# Patient Record
Sex: FEMALE | Race: WHITE | NOT HISPANIC OR LATINO | Employment: FULL TIME | URBAN - METROPOLITAN AREA
[De-identification: names, ages, dates, MRNs, and addresses within clinical notes are randomized per-mention and may not be internally consistent; named-entity substitution may affect disease eponyms.]

---

## 2019-02-05 LAB — HBA1C MFR BLD HPLC: 4.8 %

## 2019-09-26 ENCOUNTER — CLINICAL SUPPORT (OUTPATIENT)
Dept: BARIATRICS | Facility: CLINIC | Age: 44
End: 2019-09-26

## 2019-09-26 VITALS
BODY MASS INDEX: 41.48 KG/M2 | WEIGHT: 225.4 LBS | HEART RATE: 88 BPM | SYSTOLIC BLOOD PRESSURE: 130 MMHG | TEMPERATURE: 98.7 F | RESPIRATION RATE: 14 BRPM | DIASTOLIC BLOOD PRESSURE: 90 MMHG | HEIGHT: 62 IN

## 2019-09-26 DIAGNOSIS — E66.01 MORBID OBESITY (HCC): Primary | ICD-10-CM

## 2019-09-26 DIAGNOSIS — E66.01 MORBID (SEVERE) OBESITY DUE TO EXCESS CALORIES (HCC): Primary | ICD-10-CM

## 2019-09-26 PROCEDURE — RECHECK

## 2019-09-26 NOTE — PROGRESS NOTES
Bariatric Nutrition Assessment Note    Type of surgery    Preop (No Weight checks)  Surgery Date: TBD  Surgeon: Dr Kellen Barry  41 y o   female     Wt with BMI of 25: 141 1lbs  Pre-Op Excess Wt: 84 3lbs  Wt: 225 4lbs  Ht:  62"  BMI:  41 2lbs  Weight History   Onset of Obesity: Adult, after high school    Pre-preganancy weight:  175lbs, then had 1st child (13yrs old now)  Family history of obesity: Yes, dad's side  Wt Loss Attempts: Commercial Programs ([employer], [employer], etc )  Counseling with  MD  Exercise  High Protein/Low CHO diets (Atkins, Union, etc )  OTC meds/supplements  Self Created Diets (Portion Control, Healthy Food Choices, etc )  Maximum Wt Lost: -20lbs ("diet doctor" with phentermine pills)    Review of History and Medications   Past Medical History:   Diagnosis Date    Acid reflux     Asthma     Constipation     Cough     Digestive disorder     Headache     Increased urinary frequency     Joint pain     Shortness of breath      Past Surgical History:   Procedure Laterality Date    CARPAL TUNNEL RELEASE      PARTIAL HYSTERECTOMY      TENDON REPAIR      TUBAL LIGATION       Social History     Socioeconomic History    Marital status: Single     Spouse name: None    Number of children: None    Years of education: None    Highest education level: None   Occupational History    None   Social Needs    Financial resource strain: None    Food insecurity:     Worry: None     Inability: None    Transportation needs:     Medical: None     Non-medical: None   Tobacco Use    Smoking status: Never Smoker    Smokeless tobacco: Never Used   Substance and Sexual Activity    Alcohol use: No    Drug use: No    Sexual activity: None   Lifestyle    Physical activity:     Days per week: None     Minutes per session: None    Stress: None   Relationships    Social connections:     Talks on phone: None     Gets together: None     Attends Congregational service: None     Active member of club or organization: None     Attends meetings of clubs or organizations: None     Relationship status: None    Intimate partner violence:     Fear of current or ex partner: None     Emotionally abused: None     Physically abused: None     Forced sexual activity: None   Other Topics Concern    None   Social History Narrative    None       Current Outpatient Medications:     acetaminophen-codeine (TYLENOL #3) 300-30 mg per tablet, Take 1 tablet by mouth every 4 (four) hours as needed for moderate pain, Disp: , Rfl:     clonazePAM (KlonoPIN) 0 5 mg tablet, Take 0 5 mg by mouth 2 (two) times a day as needed for seizures, Disp: , Rfl:     fluticasone (FLONASE) 50 mcg/act nasal spray, 1 spray into each nostril as needed , Disp: , Rfl:     Ibuprofen (ADVIL PO), Take by mouth Advil liquid gels take 600 mg daily  , Disp: , Rfl:     fluticasone-salmeterol (ADVAIR) 250-50 mcg/dose inhaler, Inhale 1 puff every 12 (twelve) hours, Disp: , Rfl:     sulfamethoxazole-trimethoprim (BACTRIM DS) 800-160 mg per tablet, Take 1 tablet by mouth 2 (two) times a day, Disp: , Rfl:   Food Intake and Lifestyle Assessment   Food Intake Assessment completed via food log brought by patient and usual work day: At work by 5:30am, up at 4:15am   Breakfast: 4:40am-oatmeal (2 packs) made with water and a sliced banana, cereal (raisin bran or rice chex) 2 cups with 1% milk or eggs (2-3) and toast (2 slices) with butter with oj or apple juice (8oz)  Day off:  Bagel and butter or 3 eggs and sausage (2 links) with toast or cereal  Snack: no snack on a work day - just [employer]: 11am on work day will buy a yogurt (non-greek) or granola bar ([place] crunchy bars) or a piece of fruit    Flako Brisk 16oz   Snack: no snack while at work  Leaves work between [employer], 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  Dinner: usually grabbing something on the way home-pizza (2) or Chinese (sweet and sour chicken and rice) or Tacos (from [address] [facility] rest)  On a day off:  Chicken, rice and broccoli or steak and green beans and baked potato, pork chop and corn and rice or pasta and meatballs  Snack: pretzels or fruit  Gets to be around 8:30pm on a work night  Beverage intake: water, sweetened beverages and juice  Protein supplement: dislikes  Estimated protein intake per day: 60gm  Estimated fluid intake per day: 48oz water, 16oz regular iced tea, 8oz juice  Meals eaten away from home: 4 times per week  Typical meal pattern: 2-3 meals per day and 1 snack per day in the evening  Eating Behaviors: Consumption of high calorie/ high fat foods, Consumption of high calorie beverages and Large portion sizes  Food allergies or intolerances: Allergies   Allergen Reactions    Seasonal Ic [Cholestatin] Cough     Cultural or Sabianist considerations: none    Physical Assessment  Physical Activity  Types of exercise: None  On her feet all day at work  Current physical limitations: work schedule, but no physical issues    Psychosocial Assessment   Support systems: spouse and children friend(s)  Socioeconomic factors: none    Nutrition Diagnosis  Diagnosis: Overweight / Obesity (NC-3 3)  Related to: Physical inactivity and Excessive energy intake  As Evidenced by: BMI >25     Nutrition Prescription: Recommend the following diet  Regular    Interventions and Teaching   Discussed pre-op and post-op nutrition guidelines  Patient educated and handouts provided    Surgical changes to stomach / GI  Capacity of post-surgery stomach  Diet progression  Adequate hydration  Sugar and fat restriction to decrease "dumping syndrome"  Fat restriction to decrease steatorrhea  Expected weight loss  Weight loss plateaus/ possibility of weight regain  Exercise  Suggestions for pre-op diet  Nutrition considerations after surgery  Protein supplements  Meal planning and preparation  Appropriate carbohydrate, protein, and fat intake, and food/fluid choices to maximize safe weight loss, nutrient intake, and tolerance   Dietary and lifestyle changes  Possible problems with poor eating habits  Intuitive eating  Techniques for self monitoring and keeping daily food journal  Potential for food intolerance after surgery, and ways to deal with them including: lactose intolerance, nausea, reflux, vomiting, diarrhea, food intolerance, appetite changes, gas  Vitamin / Mineral supplementation of Multivitamin with minerals and Vitamin D 2000IU pre-op    Education provided to: patient    Barriers to learning: No barriers identified  Readiness to change: preparation    Prior research on procedure: internet, discussed with provider and friends or family    Comprehension: verbalizes understanding     Expected Compliance: good  Recommendations  Pt is an appropriate candidate for surgery  Yes  Evaluation / Monitoring  Dietitian to Monitor: Eating pattern as discussed Tolerance of nutrition prescription Body weight Physical activity  BMI of 40 = 219lbs, pt currently at 225lbs (BMI 41 2) without comorbibities  Advised pt to make some diet changes, but do not go below 219lbs in order to remain qualified, but should not gain weight     Goals  Eliminate sugar sweetened beverages; use crystal light instead  Food journal via Chef saqib  Exercise 30 minutes 5 times per week  Complete lession plans 1-6  Eat 3 meals per day; provided with protein shake to try and possibly have as lunch with a piece of fruit  Eliminate mindless snacking  Have protein with each meal and snack  Decrease eating out to no more then 2x/week  Time Spent:   1 Hour
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
## 2019-10-23 ENCOUNTER — OFFICE VISIT (OUTPATIENT)
Dept: BARIATRICS | Facility: CLINIC | Age: 44
End: 2019-10-23
Payer: COMMERCIAL

## 2019-10-23 VITALS
RESPIRATION RATE: 16 BRPM | SYSTOLIC BLOOD PRESSURE: 132 MMHG | TEMPERATURE: 98.3 F | HEART RATE: 99 BPM | BODY MASS INDEX: 42.03 KG/M2 | WEIGHT: 228.4 LBS | DIASTOLIC BLOOD PRESSURE: 98 MMHG | HEIGHT: 62 IN

## 2019-10-23 DIAGNOSIS — E66.01 OBESITY, CLASS III, BMI 40-49.9 (MORBID OBESITY) (HCC): Primary | ICD-10-CM

## 2019-10-23 PROBLEM — E66.813 OBESITY, CLASS III, BMI 40-49.9 (MORBID OBESITY): Status: ACTIVE | Noted: 2019-10-23

## 2019-10-23 PROCEDURE — 99203 OFFICE O/P NEW LOW 30 MIN: CPT | Performed by: SURGERY

## 2019-10-23 NOTE — LETTER
October 23, 2019     Tita Oscar, [address] [address]    Patient: Christie Peters   YOB: 1975   Date of Visit: 10/23/2019       Dear Dr Caballero Neighbor:    Thank you for referring Christie Peters to me for evaluation for bariatric surgery  Below are my notes for this consultation  If you have questions, please do not hesitate to call me  I look forward to following your patient along with you  Sincerely,      Autry Gottron, M D   10/23/2019  2:27 PM          CC: No Recipients  Karen Partida MD  10/23/2019  2:26 PM  Sign at close encounter      [address] [facility] [address] 41 y o  female MRN: [id number]  Unit/Bed#:  Encounter: [id number]      HPI:  Christie Peters is a 41 y o  female who presents with a longstanding history of morbid obesity and inability to sustain a meaningful weight loss  Here today to discuss bariatric options  She is a employee at [employer]  Body mass index is 41 77 kg/m²  ++Suffers from anxiety, asthma  S/p CTR R, R foot sx, lap partial hysterectomy, GERD (diet)  JANETTE suspected (sleep study pending)    Visit type: initial visit    Symptoms: excess weight, weight increase and inability to loss weight    Associated Symptoms: anxiety    Associated Conditions: none  Disease Complications: none  Weight Loss Interest: high  Previous Diet Trials: low calorie     Exercise Frequency:infrequency  Types of Exercise: walking      Review of Systems   Constitutional: Negative  Respiratory: Negative  Cardiovascular: Negative  Gastrointestinal: Negative  Musculoskeletal: Negative  Neurological: Negative  All other systems reviewed and are negative        Historical Information   Past Medical History:   Diagnosis Date    Acid reflux     Asthma     Baker cyst, left     Constipation     Cough     Digestive disorder     Headache     Increased urinary frequency     Joint pain     Shortness of breath      Past Surgical History:   Procedure Laterality Date    CARPAL TUNNEL RELEASE      PARTIAL HYSTERECTOMY      TENDON REPAIR      TUBAL LIGATION       Social History   Social History     Substance and Sexual Activity   Alcohol Use No     Social History     Substance and Sexual Activity   Drug Use No     Social History     Tobacco Use   Smoking Status Never Smoker   Smokeless Tobacco Never Used     Family History:   Family History   Problem Relation Age of Onset    No Known Problems Mother     Canavan disease Father     Cancer Father        Meds/Allergies   all medications and allergies reviewed  Allergies   Allergen Reactions    Seasonal Ic [Cholestatin] Cough       Objective       Current Vitals:   /98 (BP Location: Left arm, Patient Position: Sitting, Cuff Size: Large)   Pulse 99   Temp 98 3 °F (36 8 °C) (Tympanic)   Resp 16   Ht 5' 2" (1 575 m)   Wt 104 kg (228 lb 6 4 oz)   BMI 41 77 kg/m²        Physical Exam   Constitutional: She is oriented to person, place, and time  She appears well-developed  HENT:   Head: Normocephalic  Eyes: EOM are normal    Neck: Normal range of motion  Cardiovascular: Normal rate  Pulmonary/Chest: Effort normal    Abdominal: She exhibits no distension  Musculoskeletal: Normal range of motion  Neurological: She is alert and oriented to person, place, and time  Skin: Skin is warm and dry  Psychiatric: She has a normal mood and affect  Her behavior is normal  Judgment and thought content normal        Lab Results: I have personally reviewed pertinent lab results  Imaging: I have personally reviewed pertinent reports  EKG, Pathology, and Other Studies: I have personally reviewed pertinent reports  Assessment/PLAN:    41 y o  yo female with a long standing h/o of obesity and inability to sustain any meaningful weight loss on her own despite several attempts  She is interested in the Laparoscopic Sleeve gastrectomy      ++Suffers from anxiety, asthma  S/p CTR R, R foot sx, lap partial hysterectomy, GERD (diet)  JANETTE suspected (sleep study pending)    I have explained our Enhanced Recovery After Bariatric Surgery (ERABS) protocol and benefits including preoperative, intraoperative and postoperative elements  As a part of her pre op evaluation, she will be referred to a cardiologist and for a sleep evaluation and consult  She needs an EGD to evaluate the anatomy of her GI tract prior to the operation  I have spent over 45 minutes with her face to face in the office today discussing her options and details of the surgery  We have seen an animation of the surgery on the computer that illustrates how the operation is done and how the anatomy will be altered with the procedure  Over 50% of this was coordinating care  She was given the opportunity to ask questions and I have answered all of them  I have discussed and educated the patient with regards to the components of our multidisciplinary program and the importance of compliance and follow up in the post operative period  The patient was also instructed with regards to the importance of behavior modification, nutritional counseling, support meeting attendance and lifestyle changes that are important to ensure success  Although there is a great statistical chance of improvement or even resolution of most of her associated comorbidities, the results vary from patient to patient and they largely depend on her commitment and compliance  She can't be below   219 lbs prior to the operation      Autry Gottron, M D   10/23/2019  2:17 PM

## 2019-10-23 NOTE — PROGRESS NOTES
BARIATRIC INITIAL CONSULT - BARIATRIC SURGERY    Kori Covington 41 y o  female MRN: [id number]  Unit/Bed#:  Encounter: [id number]      HPI:  Kori Covington is a 41 y o  female who presents with a longstanding history of morbid obesity and inability to sustain a meaningful weight loss  Here today to discuss bariatric options  She is a employee at [employer]  Body mass index is 41 77 kg/m²  ++Suffers from anxiety, asthma  S/p CTR R, R foot sx, lap partial hysterectomy, GERD (diet)  JANETTE suspected (sleep study pending)    Visit type: initial visit    Symptoms: excess weight, weight increase and inability to loss weight    Associated Symptoms: anxiety    Associated Conditions: none  Disease Complications: none  Weight Loss Interest: high  Previous Diet Trials: low calorie     Exercise Frequency:infrequency  Types of Exercise: walking      Review of Systems   Constitutional: Negative  Respiratory: Negative  Cardiovascular: Negative  Gastrointestinal: Negative  Musculoskeletal: Negative  Neurological: Negative  All other systems reviewed and are negative        Historical Information   Past Medical History:   Diagnosis Date    Acid reflux     Asthma     Baker cyst, left     Constipation     Cough     Digestive disorder     Headache     Increased urinary frequency     Joint pain     Shortness of breath      Past Surgical History:   Procedure Laterality Date    CARPAL TUNNEL RELEASE      PARTIAL HYSTERECTOMY      TENDON REPAIR      TUBAL LIGATION       Social History   Social History     Substance and Sexual Activity   Alcohol Use No     Social History     Substance and Sexual Activity   Drug Use No     Social History     Tobacco Use   Smoking Status Never Smoker   Smokeless Tobacco Never Used     Family History:   Family History   Problem Relation Age of Onset    No Known Problems Mother     Canavan disease Father     Cancer Father        Meds/Allergies   all medications and allergies reviewed  Allergies   Allergen Reactions    Seasonal Ic [Cholestatin] Cough       Objective       Current Vitals:   /98 (BP Location: Left arm, Patient Position: Sitting, Cuff Size: Large)   Pulse 99   Temp 98 3 °F (36 8 °C) (Tympanic)   Resp 16   Ht 5' 2" (1 575 m)   Wt 104 kg (228 lb 6 4 oz)   BMI 41 77 kg/m²       Physical Exam   Constitutional: She is oriented to person, place, and time  She appears well-developed  HENT:   Head: Normocephalic  Eyes: EOM are normal    Neck: Normal range of motion  Cardiovascular: Normal rate  Pulmonary/Chest: Effort normal    Abdominal: She exhibits no distension  Musculoskeletal: Normal range of motion  Neurological: She is alert and oriented to person, place, and time  Skin: Skin is warm and dry  Psychiatric: She has a normal mood and affect  Her behavior is normal  Judgment and thought content normal        Lab Results: I have personally reviewed pertinent lab results  Imaging: I have personally reviewed pertinent reports  EKG, Pathology, and Other Studies: I have personally reviewed pertinent reports  Assessment/PLAN:    41 y o  yo female with a long standing h/o of obesity and inability to sustain any meaningful weight loss on her own despite several attempts  She is interested in the Laparoscopic Sleeve gastrectomy  ++Suffers from anxiety, asthma  S/p CTR R, R foot sx, lap partial hysterectomy, GERD (diet)  JANETTE suspected (sleep study pending)    I have explained our Enhanced Recovery After Bariatric Surgery (ERABS) protocol and benefits including preoperative, intraoperative and postoperative elements  As a part of her pre op evaluation, she will be referred to a cardiologist and for a sleep evaluation and consult  She needs an EGD to evaluate the anatomy of her GI tract prior to the operation    I have spent over 45 minutes with her face to face in the office today discussing her options and details of the surgery  We have seen an animation of the surgery on the computer that illustrates how the operation is done and how the anatomy will be altered with the procedure  Over 50% of this was coordinating care  She was given the opportunity to ask questions and I have answered all of them  I have discussed and educated the patient with regards to the components of our multidisciplinary program and the importance of compliance and follow up in the post operative period  The patient was also instructed with regards to the importance of behavior modification, nutritional counseling, support meeting attendance and lifestyle changes that are important to ensure success  Although there is a great statistical chance of improvement or even resolution of most of her associated comorbidities, the results vary from patient to patient and they largely depend on her commitment and compliance  She can't be below   219 lbs prior to the operation      TOMASA Elizondo   10/23/2019  2:17 PM

## 2019-11-12 ENCOUNTER — TELEPHONE (OUTPATIENT)
Dept: PREADMISSION TESTING | Facility: HOSPITAL | Age: 44
End: 2019-11-12

## 2019-11-12 VITALS — BODY MASS INDEX: 42.14 KG/M2 | WEIGHT: 229 LBS | HEIGHT: 62 IN

## 2019-11-12 NOTE — PRE-PROCEDURE INSTRUCTIONS
Pre-Surgery Instructions:   Medication Instructions    clonazePAM (KlonoPIN) 0 5 mg tablet Instructed patient per Anesthesia Guidelines   fluticasone (FLONASE) 50 mcg/act nasal spray Instructed patient per Anesthesia Guidelines   fluticasone-salmeterol (ADVAIR) 250-50 mcg/dose inhaler Instructed patient per Anesthesia Guidelines   Ibuprofen (ADVIL PO) Instructed patient per Anesthesia Guidelines  To use inhaler a m   Of procedure

## 2019-11-13 ENCOUNTER — TELEPHONE (OUTPATIENT)
Dept: PREADMISSION TESTING | Facility: HOSPITAL | Age: 44
End: 2019-11-13

## 2019-11-14 ENCOUNTER — TELEPHONE (OUTPATIENT)
Dept: PREADMISSION TESTING | Facility: HOSPITAL | Age: 44
End: 2019-11-14

## 2019-11-15 ENCOUNTER — HOSPITAL ENCOUNTER (OUTPATIENT)
Dept: GASTROENTEROLOGY | Facility: AMBULARY SURGERY CENTER | Age: 44
Setting detail: OUTPATIENT SURGERY
Discharge: HOME | End: 2019-11-15
Attending: SURGERY
Payer: COMMERCIAL

## 2019-11-15 ENCOUNTER — ANESTHESIA EVENT (OUTPATIENT)
Dept: GASTROENTEROLOGY | Facility: AMBULARY SURGERY CENTER | Age: 44
End: 2019-11-15

## 2019-11-15 ENCOUNTER — ANESTHESIA (OUTPATIENT)
Dept: GASTROENTEROLOGY | Facility: AMBULARY SURGERY CENTER | Age: 44
End: 2019-11-15

## 2019-11-15 VITALS
HEIGHT: 62 IN | BODY MASS INDEX: 41.59 KG/M2 | SYSTOLIC BLOOD PRESSURE: 149 MMHG | HEART RATE: 82 BPM | OXYGEN SATURATION: 98 % | WEIGHT: 226 LBS | DIASTOLIC BLOOD PRESSURE: 96 MMHG | TEMPERATURE: 97.1 F | RESPIRATION RATE: 18 BRPM

## 2019-11-15 DIAGNOSIS — E66.01 MORBID OBESITY (HCC): ICD-10-CM

## 2019-11-15 PROCEDURE — 43239 EGD BIOPSY SINGLE/MULTIPLE: CPT | Performed by: SURGERY

## 2019-11-15 PROCEDURE — 88305 TISSUE EXAM BY PATHOLOGIST: CPT | Performed by: PATHOLOGY

## 2019-11-15 RX ORDER — ONDANSETRON 2 MG/ML
4 INJECTION INTRAMUSCULAR; INTRAVENOUS ONCE AS NEEDED
Status: DISCONTINUED | OUTPATIENT
Start: 2019-11-15 | End: 2019-11-19 | Stop reason: HOSPADM

## 2019-11-15 RX ORDER — SODIUM CHLORIDE 9 MG/ML
INJECTION, SOLUTION INTRAVENOUS CONTINUOUS PRN
Status: DISCONTINUED | OUTPATIENT
Start: 2019-11-15 | End: 2019-11-15 | Stop reason: SURG

## 2019-11-15 RX ORDER — SODIUM CHLORIDE, SODIUM LACTATE, POTASSIUM CHLORIDE, CALCIUM CHLORIDE 600; 310; 30; 20 MG/100ML; MG/100ML; MG/100ML; MG/100ML
125 INJECTION, SOLUTION INTRAVENOUS CONTINUOUS
Status: DISCONTINUED | OUTPATIENT
Start: 2019-11-15 | End: 2019-11-19 | Stop reason: HOSPADM

## 2019-11-15 RX ORDER — PROPOFOL 10 MG/ML
INJECTION, EMULSION INTRAVENOUS AS NEEDED
Status: DISCONTINUED | OUTPATIENT
Start: 2019-11-15 | End: 2019-11-15 | Stop reason: SURG

## 2019-11-15 RX ADMIN — PROPOFOL 50 MG: 10 INJECTION, EMULSION INTRAVENOUS at 08:35

## 2019-11-15 RX ADMIN — PROPOFOL 50 MG: 10 INJECTION, EMULSION INTRAVENOUS at 08:38

## 2019-11-15 RX ADMIN — SODIUM CHLORIDE: 0.9 INJECTION, SOLUTION INTRAVENOUS at 08:30

## 2019-11-15 RX ADMIN — PROPOFOL 100 MG: 10 INJECTION, EMULSION INTRAVENOUS at 08:34

## 2019-11-15 RX ADMIN — SODIUM CHLORIDE, SODIUM LACTATE, POTASSIUM CHLORIDE, AND CALCIUM CHLORIDE 125 ML/HR: .6; .31; .03; .02 INJECTION, SOLUTION INTRAVENOUS at 08:23

## 2019-11-15 NOTE — ANESTHESIA POSTPROCEDURE EVALUATION
Post-Op Assessment Note    CV Status:  Stable  Pain Score: 0    Pain management: adequate     Mental Status:  Alert and awake   Hydration Status:  Stable and euvolemic   PONV Controlled:  Controlled   Airway Patency:  Patent and adequate   Post Op Vitals Reviewed: Yes      Staff: CRNA           BP   140/86   Temp     Pulse  82   Resp   20   SpO2 95

## 2019-11-15 NOTE — H&P
This is a 45 y o  female with a history of morbid obesity and Body mass index is 41 34 kg/m²  Here for an EGD to evaluate the anatomy of the GI tract and to rule out the presence of H  pylori  Physical Exam    /95   Pulse 84   Temp (!) 97 1 °F (36 2 °C) (Tympanic)   Resp 18   Ht 5' 2" (1 575 m)   Wt 103 kg (226 lb)   SpO2 96%   BMI 41 34 kg/m²    AAOx3  RRR  CTA B  Abdomen obese  Benign  A/P:    This is a 45 y o  female with a history of morbid obesity and Body mass index is 41 34 kg/m²       Will proceed with the EGD and biopsies        Kev Gottlieb MD  11/15/2019  8:30 AM

## 2019-11-15 NOTE — ANESTHESIA PREPROCEDURE EVALUATION
Review of Systems/Medical History  Patient summary reviewed        Cardiovascular  Negative cardio ROS    Pulmonary  Asthma , well controlled/ stable ,        GI/Hepatic    GERD well controlled,        Negative  ROS        Endo/Other  Negative endo/other ROS      GYN  Negative gynecology ROS          Hematology  Negative hematology ROS      Musculoskeletal  Negative musculoskeletal ROS        Neurology    Headaches,    Psychology   Negative psychology ROS              Physical Exam    Airway    Mallampati score: II  TM Distance: >3 FB  Neck ROM: full     Dental   No notable dental hx     Cardiovascular  Comment: Negative ROS, Cardiovascular exam normal    Pulmonary  Pulmonary exam normal     Other Findings        Anesthesia Plan  ASA Score- 3     Anesthesia Type- IV sedation with anesthesia with ASA Monitors  Additional Monitors:   Airway Plan:         Plan Factors-    Induction-     Postoperative Plan-     Informed Consent- Anesthetic plan and risks discussed with patient  I personally reviewed this patient with the CRNA  Discussed and agreed on the Anesthesia Plan with the CRNA  Stefanie Albarado

## 2019-12-03 ENCOUNTER — CONSULT (OUTPATIENT)
Dept: CARDIOLOGY CLINIC | Facility: CLINIC | Age: 44
End: 2019-12-03
Payer: COMMERCIAL

## 2019-12-03 VITALS
BODY MASS INDEX: 41.96 KG/M2 | HEIGHT: 62 IN | OXYGEN SATURATION: 96 % | DIASTOLIC BLOOD PRESSURE: 88 MMHG | WEIGHT: 228 LBS | HEART RATE: 88 BPM | SYSTOLIC BLOOD PRESSURE: 140 MMHG

## 2019-12-03 DIAGNOSIS — E66.01 MORBID OBESITY (HCC): ICD-10-CM

## 2019-12-03 DIAGNOSIS — Z01.810 PREOP CARDIOVASCULAR EXAM: Primary | ICD-10-CM

## 2019-12-03 PROCEDURE — 99204 OFFICE O/P NEW MOD 45 MIN: CPT | Performed by: INTERNAL MEDICINE

## 2019-12-03 PROCEDURE — 93000 ELECTROCARDIOGRAM COMPLETE: CPT | Performed by: INTERNAL MEDICINE

## 2019-12-03 NOTE — PROGRESS NOTES
Subjective:     Tanya Bahena is a 45 y o  female  who presents to the office today for a preoperative consultation at the request of surgeon Dr Carlos Souza who plans on performing Gastric sleeve on February 3rd  Planned anesthesia is general  The patient has no known anesthesia issues  Most recent surgery was Ankle surgery 2 years ago without complications  she is able to ambulate 4 blocks on level ground or 2 flights of stairs without stopping   (>4 METs)  She denies any chest pain or shortness of breath with exertion  The following portions of the patient's history were reviewed and updated as appropriate:   She  has a past medical history of Acid reflux, Asthma, Baker cyst, left, Constipation, Cough, Digestive disorder, Headache, Increased urinary frequency, Joint pain, and Shortness of breath  She  has a past surgical history that includes Tendon repair; Carpal tunnel release; Partial hysterectomy; Tubal ligation; and Hysterectomy  Her family history includes Canavan disease in her father; Cancer in her father; No Known Problems in her mother  She  reports that she has never smoked  She has never used smokeless tobacco  She reports that she drinks alcohol  She reports that she does not use drugs  Current Outpatient Medications   Medication Sig Dispense Refill    clonazePAM (KlonoPIN) 0 5 mg tablet Take 0 5 mg by mouth 2 (two) times a day as needed for anxiety       fluticasone (FLONASE) 50 mcg/act nasal spray 1 spray into each nostril daily at bedtime       fluticasone-salmeterol (ADVAIR) 250-50 mcg/dose inhaler Inhale 1 puff every 12 (twelve) hours      Ibuprofen (ADVIL PO) Take by mouth Advil liquid gels take 600 mg daily        acetaminophen-codeine (TYLENOL #3) 300-30 mg per tablet Take 1 tablet by mouth every 4 (four) hours as needed for moderate pain      sulfamethoxazole-trimethoprim (BACTRIM DS) 800-160 mg per tablet Take 1 tablet by mouth 2 (two) times a day       No current facility-administered medications for this visit  She is allergic to seasonal ic [cholestatin]       Review of Systems  Review of Systems   Constitutional: Negative for chills, fatigue and fever  HENT: Negative for congestion, nosebleeds and postnasal drip  Respiratory: Negative for cough, chest tightness and shortness of breath  Cardiovascular: Negative for chest pain, palpitations and leg swelling  Gastrointestinal: Negative for abdominal distention, abdominal pain, diarrhea, nausea and vomiting  Endocrine: Negative for polydipsia, polyphagia and polyuria  Musculoskeletal: Negative for gait problem and myalgias  Skin: Negative for color change, pallor and rash  Allergic/Immunologic: Negative for environmental allergies, food allergies and immunocompromised state  Neurological: Negative for dizziness, seizures, syncope and light-headedness  Hematological: Negative for adenopathy  Does not bruise/bleed easily  Psychiatric/Behavioral: Negative for dysphoric mood  The patient is not nervous/anxious  Objective:      Physical Exam  /88 (BP Location: Right arm, Patient Position: Sitting, Cuff Size: Large)   Pulse 88   Ht 5' 2" (1 575 m)   Wt 103 kg (228 lb)   SpO2 96%   BMI 41 70 kg/m²    Physical Exam   Constitutional: She appears healthy  No distress  HENT:   Nose: Nose normal    Mouth/Throat: Dentition is normal  Oropharynx is clear  Eyes: Pupils are equal, round, and reactive to light  Conjunctivae are normal    Neck: Normal range of motion  Neck supple  No JVD present  Cardiovascular: Normal rate, regular rhythm and normal heart sounds  Exam reveals no gallop and no friction rub  No murmur heard  Pulmonary/Chest: Effort normal and breath sounds normal  She has no wheezes  She has no rales  Musculoskeletal: She exhibits no edema  Neurological: She is alert and oriented to person, place, and time  Skin: Skin is warm and dry          Cardiographics  ECG: normal sinus rhythm, no blocks or conduction defects, no ischemic changes      Lab Review   Hospital Outpatient Visit on 11/15/2019   Component Date Value    Case Report 11/15/2019                      Value:Surgical Pathology Report                         Case: [id number]                                   Authorizing Provider:  Sony Ramos MD        Collected:           11/15/2019 0836              Ordering Location:     [facility]   Received:            11/15/2019 [facility]                                                                       Pathologist:           Thu Batres MD                                                        Specimen:    Stomach, antrum R/O H-pylori                                                               Final Diagnosis 11/15/2019                      Value: This result contains rich text formatting which cannot be displayed here   Additional Information 11/15/2019                      Value: This result contains rich text formatting which cannot be displayed here  Deirdre Bhatti Gross Description 11/15/2019                      Value: This result contains rich text formatting which cannot be displayed here  Assessment:     1  Preop cardiovascular exam    2  Morbid obesity ([address] )           60 y o  female  with planned surgery as above  Known risk factors for perioperative complications: None        Cardiac Risk Estimation: per the Revised Cardiac Risk Index, the patient has a score of 0 placing her at low risk of major cardiac event (3 9%),  and may proceed to OR as planned  No further cardiac workup is indicated at this time  Plan:      1  Preoperative workup as follows none  2  Change in medication regimen before surgery: none, continue medication regimen including morning of surgery, with sip of water  3  Prophylaxis for cardiac events with perioperative beta-blockers: not indicated     4  Invasive hemodynamic monitoring perioperatively: at the discretion of anesthesiologist   5  Deep vein thrombosis prophylaxis postoperatively:regimen to be chosen by surgical team   6  Call if any change in status prior to surgical date  7  Review preoperative blood work and chest x-ray

## 2019-12-06 ENCOUNTER — HOSPITAL ENCOUNTER (OUTPATIENT)
Dept: GASTROENTEROLOGY | Facility: AMBULARY SURGERY CENTER | Age: 44
Discharge: HOME | End: 2019-12-06
Attending: SURGERY

## 2019-12-14 LAB
ALBUMIN SERPL-MCNC: 3.6 G/DL (ref 3.6–5.1)
ALBUMIN/GLOB SERPL: 1.4 (CALC) (ref 1–2.5)
ALP SERPL-CCNC: 64 U/L (ref 33–115)
ALT SERPL-CCNC: 20 U/L (ref 6–29)
AST SERPL-CCNC: 15 U/L (ref 10–30)
BASOPHILS # BLD AUTO: 68 CELLS/UL (ref 0–200)
BASOPHILS NFR BLD AUTO: 0.9 %
BILIRUB SERPL-MCNC: 0.5 MG/DL (ref 0.2–1.2)
BUN SERPL-MCNC: 11 MG/DL (ref 7–25)
BUN/CREAT SERPL: NORMAL (CALC) (ref 6–22)
CALCIUM SERPL-MCNC: 8.7 MG/DL (ref 8.6–10.2)
CHLORIDE SERPL-SCNC: 104 MMOL/L (ref 98–110)
CHOLEST SERPL-MCNC: 169 MG/DL
CHOLEST/HDLC SERPL: 3.5 (CALC)
CO2 SERPL-SCNC: 28 MMOL/L (ref 20–32)
CREAT SERPL-MCNC: 0.73 MG/DL (ref 0.5–1.1)
EOSINOPHIL # BLD AUTO: 60 CELLS/UL (ref 15–500)
EOSINOPHIL NFR BLD AUTO: 0.8 %
ERYTHROCYTE [DISTWIDTH] IN BLOOD BY AUTOMATED COUNT: 13.3 % (ref 11–15)
GLOBULIN SER CALC-MCNC: 2.6 G/DL (CALC) (ref 1.9–3.7)
GLUCOSE SERPL-MCNC: 83 MG/DL (ref 65–99)
HBA1C MFR BLD: 4.9 % OF TOTAL HGB
HCT VFR BLD AUTO: 39.3 % (ref 35–45)
HDLC SERPL-MCNC: 48 MG/DL
HGB BLD-MCNC: 13.2 G/DL (ref 11.7–15.5)
LDLC SERPL CALC-MCNC: 91 MG/DL (CALC)
LYMPHOCYTES # BLD AUTO: 2280 CELLS/UL (ref 850–3900)
LYMPHOCYTES NFR BLD AUTO: 30.4 %
MCH RBC QN AUTO: 31.4 PG (ref 27–33)
MCHC RBC AUTO-ENTMCNC: 33.6 G/DL (ref 32–36)
MCV RBC AUTO: 93.6 FL (ref 80–100)
MONOCYTES # BLD AUTO: 473 CELLS/UL (ref 200–950)
MONOCYTES NFR BLD AUTO: 6.3 %
NEUTROPHILS # BLD AUTO: 4620 CELLS/UL (ref 1500–7800)
NEUTROPHILS NFR BLD AUTO: 61.6 %
NONHDLC SERPL-MCNC: 121 MG/DL (CALC)
PLATELET # BLD AUTO: 227 THOUSAND/UL (ref 140–400)
PMV BLD REES-ECKER: 9.9 FL (ref 7.5–12.5)
POTASSIUM SERPL-SCNC: 3.9 MMOL/L (ref 3.5–5.3)
PROT SERPL-MCNC: 6.2 G/DL (ref 6.1–8.1)
RBC # BLD AUTO: 4.2 MILLION/UL (ref 3.8–5.1)
SL AMB EGFR AFRICAN AMERICAN: 118 ML/MIN/1.73M2
SL AMB EGFR NON AFRICAN AMERICAN: 102 ML/MIN/1.73M2
SODIUM SERPL-SCNC: 138 MMOL/L (ref 135–146)
TRIGL SERPL-MCNC: 210 MG/DL
TSH SERPL-ACNC: 1.41 MIU/L
WBC # BLD AUTO: 7.5 THOUSAND/UL (ref 3.8–10.8)

## 2020-01-14 ENCOUNTER — OFFICE VISIT (OUTPATIENT)
Dept: BARIATRICS | Facility: CLINIC | Age: 45
End: 2020-01-14

## 2020-01-14 VITALS — WEIGHT: 236 LBS | BODY MASS INDEX: 43.43 KG/M2 | HEIGHT: 62 IN

## 2020-01-14 DIAGNOSIS — R63.5 ABNORMAL WEIGHT GAIN: Primary | ICD-10-CM

## 2020-01-14 PROCEDURE — RECHECK

## 2020-01-14 NOTE — PROGRESS NOTES
Pt came in to check weight and go over workflow  Discussed needing results of sleep study and discuss submitting to insurance

## 2020-02-14 ENCOUNTER — TELEPHONE (OUTPATIENT)
Dept: BARIATRICS | Facility: CLINIC | Age: 45
End: 2020-02-14

## 2020-02-14 NOTE — TELEPHONE ENCOUNTER
Called pt to advise her that we did not receive her letter/referral from her PCP as of today  Pt states she will call her PCP office right now and have them send it over  Requested pt also schedule an appointment for a weight check since she hasn't been into the office for about a month  Pt states she will have to call back on Wednesday when she gets her work schedule

## 2020-03-17 ENCOUNTER — TELEPHONE (OUTPATIENT)
Dept: BARIATRICS | Facility: CLINIC | Age: 45
End: 2020-03-17

## 2020-03-17 NOTE — TELEPHONE ENCOUNTER
Spoke with patient regarding COVID screening  Patient denies any symptoms or recent travel at this time

## 2020-03-18 ENCOUNTER — OFFICE VISIT (OUTPATIENT)
Dept: BARIATRICS | Facility: CLINIC | Age: 45
End: 2020-03-18

## 2020-03-18 VITALS — WEIGHT: 232 LBS | HEIGHT: 62 IN | BODY MASS INDEX: 42.69 KG/M2

## 2020-03-18 DIAGNOSIS — Z98.84 BARIATRIC SURGERY STATUS: ICD-10-CM

## 2020-03-18 DIAGNOSIS — E66.01 MORBID (SEVERE) OBESITY DUE TO EXCESS CALORIES (HCC): Primary | ICD-10-CM

## 2020-03-18 PROCEDURE — RECHECK

## 2020-03-18 NOTE — PROGRESS NOTES
Bariatric Nutrition Assessment Note    Type of surgery    Preop (No Weight checks)  Surgery Date: TBD  Surgeon: Dr Homer Malhotra  45 y o   female     Wt with BMI of 25: 136#  Pre-Op Excess Wt: 96lbs  Wt: 232lbs  Ht:  62"  BMI:  42 4lbs  Review of History and Medications   Past Medical History:   Diagnosis Date    Acid reflux     Asthma     Baker cyst, left     Constipation     Cough     Digestive disorder     Headache     Increased urinary frequency     Joint pain     Shortness of breath      Past Surgical History:   Procedure Laterality Date    CARPAL TUNNEL RELEASE      HYSTERECTOMY      PARTIAL HYSTERECTOMY      TENDON REPAIR      TUBAL LIGATION       Social History     Socioeconomic History    Marital status: Single     Spouse name: Not on file    Number of children: Not on file    Years of education: Not on file    Highest education level: Not on file   Occupational History    Not on file   Social Needs    Financial resource strain: Not on file    Food insecurity:     Worry: Not on file     Inability: Not on file    Transportation needs:     Medical: Not on file     Non-medical: Not on file   Tobacco Use    Smoking status: Never Smoker    Smokeless tobacco: Never Used   Substance and Sexual Activity    Alcohol use: Yes     Comment: 0cc    Drug use: No    Sexual activity: Not on file   Lifestyle    Physical activity:     Days per week: Not on file     Minutes per session: Not on file    Stress: Not on file   Relationships    Social connections:     Talks on phone: Not on file     Gets together: Not on file     Attends Moravian service: Not on file     Active member of club or organization: Not on file     Attends meetings of clubs or organizations: Not on file     Relationship status: Not on file    Intimate partner violence:     Fear of current or ex partner: Not on file     Emotionally abused: Not on file     Physically abused: Not on file     Forced sexual activity: Not on file   Other Topics Concern    Not on file   Social History Narrative    Not on file       Current Outpatient Medications:     acetaminophen-codeine (TYLENOL #3) 300-30 mg per tablet, Take 1 tablet by mouth every 4 (four) hours as needed for moderate pain, Disp: , Rfl:     clonazePAM (KlonoPIN) 0 5 mg tablet, Take 0 5 mg by mouth 2 (two) times a day as needed for anxiety , Disp: , Rfl:     fluticasone (FLONASE) 50 mcg/act nasal spray, 1 spray into each nostril daily at bedtime , Disp: , Rfl:     fluticasone-salmeterol (ADVAIR) 250-50 mcg/dose inhaler, Inhale 1 puff every 12 (twelve) hours, Disp: , Rfl:     Ibuprofen (ADVIL PO), Take by mouth Advil liquid gels take 600 mg daily  , Disp: , Rfl:     sulfamethoxazole-trimethoprim (BACTRIM DS) 800-160 mg per tablet, Take 1 tablet by mouth 2 (two) times a day, Disp: , Rfl:   Food Intake and Lifestyle Assessment   Food Intake Assessment completed via food log brought by patient and usual work day  Has had a lot of stress since starting in December-friend passing and break up with boyfriend which had caused some stress/emotional eating at the time, but feels has it under control now  At work by 5:30am, up at 4:15am   Breakfast: 4:40am-vanilla yogurt with fruit and granola or smoothie (1 premier and 1 cup berries)  Day off:  Bagel and butter or 3 eggs and sausage (2 links) with toast or cereal or nothing  Snack: 9am-banana and greek yogurt  Lunch: 12pm-just a fruit or granola bar ([place] crunchy bars) or a piece of fruit    Flako Brisk 16oz , no set meal for lunch  Snack: no snack while at work  Leaves work between [employer], 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  Dinner: 5/6pm:  6oz chicken with 1 cup broccoli with cheese and 1/2 cup rice   usually grabbing something on the way home-pizza (2) or ) or Tacos (from [facility])  On a day off:  Chicken, rice and broccoli or steak and green beans and baked potato, pork chop and corn and rice or pasta and meatballs  Snack: pretzels or fruit  Gets to be around 8:30pm on a work night  Beverage intake: water and crystal light, no coffee or tea  Protein supplement: dislikes  Estimated protein intake per day: 60gm  Estimated fluid intake per day: 48oz water, 16oz regular iced tea, 8oz juice  Meals eaten away from home: down to 1-2 times per week  Typical meal pattern: 2-3 meals per day and 1 snack per day in the evening  Eating Behaviors: Consumption of high calorie/ high fat foods, Consumption of high calorie beverages and Large portion sizes  Food allergies or intolerances: Allergies   Allergen Reactions    Seasonal Ic [Cholestatin] Cough     Seasonal Allergies       Cultural or Sikh considerations: none    Physical Assessment  Physical Activity  Types of exercise: None because knee has been hurting  On her feet all day at work  Current physical limitations: work schedule and knee pain    Psychosocial Assessment   Support systems: children friend(s)  Socioeconomic factors: none    Nutrition Diagnosis  Diagnosis: Overweight / Obesity (NC-3 3)  Related to: Physical inactivity and Excessive energy intake  As Evidenced by: BMI >25     Nutrition Prescription: Recommend the following diet  Regular    Interventions and Teaching   Discussed pre-op and post-op nutrition guidelines  Patient educated and handouts provided    Surgical changes to stomach / GI  Capacity of post-surgery stomach  Adequate hydration  Exercise  Suggestions for pre-op diet  Nutrition considerations after surgery  Protein supplements  Meal planning and preparation  Appropriate carbohydrate, protein, and fat intake, and food/fluid choices to maximize safe weight loss, nutrient intake, and tolerance   Dietary and lifestyle changes  Possible problems with poor eating habits  Intuitive eating  Techniques for self monitoring and keeping daily food journal-downloaded [employer] saqib while in office    Education provided to: patient    Barriers to learning: No barriers identified  Readiness to change: preparation    Prior research on procedure: internet, discussed with provider and friends or family    Comprehension: verbalizes understanding     Expected Compliance: good  Recommendations  Pt is an appropriate candidate for surgery  Yes  Evaluation / Monitoring  Dietitian to Monitor: Eating pattern as discussed Tolerance of nutrition prescription Body weight Physical activity  Pt recently dx with JANETTE and is using the CPAP therefore her BMI can down go down to 35, which is a weight of 192lbs, and still qualify  Advised to at least get back to her start weight  Pt has gained some weight since start of program  She admits she had some stressors in her life since December that caused some emotional eating  She appears to be skipping meals and unable to exercise much due to knee pain  Discussed the need to have at least 3 meals per day and 1-2 snacks per day  Advised can consuming 4285-0030 calories per day and lose 1lb per week, but not to go less than 1200 calories per day  Reviewed [employer] saqib with pt to log her food to get a better idea of intake      Goals  Continue to use crystal light  Food journal via [employer] saqib  Walk 30 minutes 5 times per week (10 mins 3x/day)  Complete lession plans 1-6  Eat 3 meals per day and 2 snacks per day (provided with snack list)  Eliminate mindless snacking  Have protein with each meal and snack    Time Spent:   30 mins

## 2020-03-31 ENCOUNTER — TELEPHONE (OUTPATIENT)
Dept: BARIATRICS | Facility: CLINIC | Age: 45
End: 2020-03-31

## 2020-03-31 NOTE — TELEPHONE ENCOUNTER
ERNA left message for patient regarding office visit scheduled for 4/1/20 Palomo UMANZOR explained that for everyone's safety during the COVID-19 outbreak, we are not seeing patients in the office but we are offering phone or video visits  A return phone number was left for patient to call back to discuss these options

## 2020-04-07 ENCOUNTER — TELEPHONE (OUTPATIENT)
Dept: BARIATRICS | Facility: CLINIC | Age: 45
End: 2020-04-07

## 2020-04-29 ENCOUNTER — OFFICE VISIT (OUTPATIENT)
Dept: BARIATRICS | Facility: CLINIC | Age: 45
End: 2020-04-29

## 2020-04-29 VITALS — HEIGHT: 62 IN | WEIGHT: 228 LBS | BODY MASS INDEX: 41.96 KG/M2

## 2020-04-29 DIAGNOSIS — E66.01 MORBID (SEVERE) OBESITY DUE TO EXCESS CALORIES (HCC): Primary | ICD-10-CM

## 2020-04-29 DIAGNOSIS — Z98.84 BARIATRIC SURGERY STATUS: ICD-10-CM

## 2020-04-29 PROCEDURE — RECHECK

## 2020-05-19 ENCOUNTER — TELEPHONE (OUTPATIENT)
Dept: BARIATRICS | Facility: CLINIC | Age: 45
End: 2020-05-19

## 2020-05-20 ENCOUNTER — OFFICE VISIT (OUTPATIENT)
Dept: BARIATRICS | Facility: CLINIC | Age: 45
End: 2020-05-20

## 2020-05-20 VITALS — WEIGHT: 226 LBS | BODY MASS INDEX: 41.59 KG/M2 | HEIGHT: 62 IN

## 2020-05-20 DIAGNOSIS — E66.01 MORBID (SEVERE) OBESITY DUE TO EXCESS CALORIES (HCC): Primary | ICD-10-CM

## 2020-05-20 DIAGNOSIS — Z98.84 BARIATRIC SURGERY STATUS: ICD-10-CM

## 2020-05-20 PROCEDURE — RECHECK

## 2020-06-05 ENCOUNTER — TELEPHONE (OUTPATIENT)
Dept: BARIATRICS | Facility: CLINIC | Age: 45
End: 2020-06-05

## 2020-06-17 ENCOUNTER — TELEPHONE (OUTPATIENT)
Dept: BARIATRICS | Facility: CLINIC | Age: 45
End: 2020-06-17

## 2020-07-10 RX ORDER — MELOXICAM 15 MG/1
1 TABLET ORAL
Status: ON HOLD | COMMUNITY
Start: 2020-04-20 | End: 2020-08-04

## 2020-07-16 ENCOUNTER — TELEPHONE (OUTPATIENT)
Dept: BARIATRICS | Facility: CLINIC | Age: 45
End: 2020-07-16

## 2020-07-16 NOTE — TELEPHONE ENCOUNTER
COVID Pre-Visit Screening     1  Is this a family member screening? No  2  Have you traveled outside of your state in the past 2 weeks? No  3  Do you presently have a fever or flu-like symptoms? No  4  Do you have symptoms of an upper respiratory infection like runny nose, sore throat, or cough? No  5  Are you suffering from new headache that you have not had in the past?  No  6  Do you have/have you experienced any new shortness of breath recently? No  7  Do you have any new diarrhea, nausea or vomiting? Yes  8  Have you been in contact with anyone who has been sick or diagnosed with COVID-19? No  9  Do you have any new loss of taste or smell? No  10  Are you able to wear a mask without a valve for the entire visit?  Yes

## 2020-07-17 ENCOUNTER — OFFICE VISIT (OUTPATIENT)
Dept: BARIATRICS | Facility: CLINIC | Age: 45
End: 2020-07-17
Payer: COMMERCIAL

## 2020-07-17 ENCOUNTER — OFFICE VISIT (OUTPATIENT)
Dept: BARIATRICS | Facility: CLINIC | Age: 45
End: 2020-07-17

## 2020-07-17 ENCOUNTER — PREP FOR PROCEDURE (OUTPATIENT)
Dept: BARIATRICS | Facility: CLINIC | Age: 45
End: 2020-07-17

## 2020-07-17 VITALS — HEIGHT: 62 IN | WEIGHT: 227.8 LBS | BODY MASS INDEX: 41.92 KG/M2

## 2020-07-17 VITALS
BODY MASS INDEX: 41.92 KG/M2 | SYSTOLIC BLOOD PRESSURE: 112 MMHG | HEIGHT: 62 IN | RESPIRATION RATE: 14 BRPM | WEIGHT: 227.8 LBS | TEMPERATURE: 98 F | DIASTOLIC BLOOD PRESSURE: 78 MMHG | HEART RATE: 86 BPM

## 2020-07-17 DIAGNOSIS — E66.01 MORBID OBESITY (HCC): Primary | ICD-10-CM

## 2020-07-17 DIAGNOSIS — J45.909 ASTHMA: ICD-10-CM

## 2020-07-17 DIAGNOSIS — E66.01 OBESITY, CLASS III, BMI 40-49.9 (MORBID OBESITY) (HCC): Primary | ICD-10-CM

## 2020-07-17 DIAGNOSIS — E11.9 DIABETES MELLITUS (HCC): ICD-10-CM

## 2020-07-17 DIAGNOSIS — E78.5 HYPERLIPEMIA: ICD-10-CM

## 2020-07-17 DIAGNOSIS — Z98.84 BARIATRIC SURGERY STATUS: ICD-10-CM

## 2020-07-17 DIAGNOSIS — Z01.818 PREOP TESTING: ICD-10-CM

## 2020-07-17 DIAGNOSIS — F41.9 ANXIETY: ICD-10-CM

## 2020-07-17 DIAGNOSIS — E66.01 MORBID (SEVERE) OBESITY DUE TO EXCESS CALORIES (HCC): Primary | ICD-10-CM

## 2020-07-17 DIAGNOSIS — Z01.811 PREOP RESPIRATORY EXAM: ICD-10-CM

## 2020-07-17 DIAGNOSIS — G47.33 OSA (OBSTRUCTIVE SLEEP APNEA): ICD-10-CM

## 2020-07-17 DIAGNOSIS — G47.33 OBSTRUCTIVE SLEEP APNEA: ICD-10-CM

## 2020-07-17 PROCEDURE — 99213 OFFICE O/P EST LOW 20 MIN: CPT | Performed by: SURGERY

## 2020-07-17 PROCEDURE — RECHECK

## 2020-07-17 RX ORDER — GABAPENTIN 100 MG/1
600 CAPSULE ORAL ONCE
Status: CANCELLED | OUTPATIENT
Start: 2020-07-17 | End: 2020-07-17

## 2020-07-17 RX ORDER — OMEPRAZOLE 20 MG/1
20 TABLET, DELAYED RELEASE ORAL DAILY
Qty: 90 TABLET | Refills: 1 | Status: SHIPPED | OUTPATIENT
Start: 2020-07-17 | End: 2020-11-19 | Stop reason: HOSPADM

## 2020-07-17 RX ORDER — ACETAMINOPHEN 325 MG/1
975 TABLET ORAL ONCE
Status: CANCELLED | OUTPATIENT
Start: 2020-07-17 | End: 2020-07-17

## 2020-07-17 RX ORDER — CELECOXIB 200 MG/1
200 CAPSULE ORAL ONCE
Status: CANCELLED | OUTPATIENT
Start: 2020-07-17 | End: 2020-07-17

## 2020-07-17 RX ORDER — SCOLOPAMINE TRANSDERMAL SYSTEM 1 MG/1
1 PATCH, EXTENDED RELEASE TRANSDERMAL ONCE
Status: CANCELLED | OUTPATIENT
Start: 2020-07-17 | End: 2020-07-17

## 2020-07-17 RX ORDER — ENOXAPARIN SODIUM 300 MG/3ML
40 INJECTION INTRAVENOUS; SUBCUTANEOUS
Status: CANCELLED | OUTPATIENT
Start: 2020-07-18 | End: 2020-07-19

## 2020-07-17 RX ORDER — OXYCODONE HYDROCHLORIDE 5 MG/1
5 TABLET ORAL EVERY 4 HOURS PRN
Qty: 10 TABLET | Refills: 0 | Status: SHIPPED | OUTPATIENT
Start: 2020-07-17 | End: 2020-11-18 | Stop reason: ALTCHOICE

## 2020-07-17 NOTE — PROGRESS NOTES
H&P Exam - Bariatric Surgery   Lashell Vee 45 y o  female MRN: [id number]   Encounter: [id number]      HPI:    45 y o  yo morbidly obese female found to be a good candidate to undergo a weight loss operation upon being enrolled here at the [facility]   She has been pre certified to undergo a Laparoscopic sleeve gastrectomy  I have discussed with the patient that 20-30% of patient's may experience worsened GERD and 18% risk of Greenwood's esophagitis requiring surveillance EGDs after a sleeve gastrectomy  The patient understands this fully and accepts the risks to undergo a sleeve gastrectomy  ++Suffers from anxiety, asthma, GERD (diet); JANETTE   S/p CTR R, R foot sx, lap partial hysterectomy    Here today to review her pre op test results  Has been medically cleared for the procedure  I have explained our Enhanced Recovery After Bariatric Surgery (ERABS) protocol and benefits including preoperative, intraoperative and postoperative elements  I have discussed with her at length the risks and benefits of the operation and reiterated the components of our multidisciplinary program and the importance of compliance and follow up in the post operative period  Although there is a great statistical chance of improvement or even resolution of most of her associated comorbidities, the results vary from patient to patient and they largely depend on his commitment  The patient was also instructed with regards to the importance of behavior modification, nutritional counseling, support meeting attendance and lifestyle changes that are important to ensure success  She was given the opportunity to ask questions and I have answered all of them  I have addressed with the patient the level of CODE STATUS for this hospital stay and after explaining the different options currently she wishes to be a Level I  She understands and wishes to proceed      She has to be at 219# on DOS    Review of Systems   Constitutional: Negative  Respiratory: Negative  Cardiovascular: Negative  Gastrointestinal: Negative  Musculoskeletal: Negative  Neurological: Negative  All other systems reviewed and are negative  Historical Information   Past Medical History:   Diagnosis Date    Acid reflux     Asthma     Baker cyst, left     Constipation     Cough     Digestive disorder     Headache     History of mammogram 07/17/2018    Increased urinary frequency     Joint pain     Morbid obesity with BMI of 40 0-44 9, adult (HCC)     Seasonal allergies     Shortness of breath      Past Surgical History:   Procedure Laterality Date    CARPAL TUNNEL RELEASE      ENDOMETRIAL ABLATION W/ [place]  2014    Laparscopy, D&C, Novasure ablation    HYSTERECTOMY  12/20/2016    B/L salpingectomy    PARTIAL HYSTERECTOMY      TENDON REPAIR  2016    TUBAL LIGATION  08/06/2010    Laparoscopy tubal cautery     Social History   Social History     Substance and Sexual Activity   Alcohol Use Yes    Comment: 0cc     Social History     Substance and Sexual Activity   Drug Use No     Social History     Tobacco Use   Smoking Status Never Smoker   Smokeless Tobacco Never Used     Family History: non-contributory    Meds/Allergies   all medications and allergies reviewed  Allergies   Allergen Reactions    Seasonal Ic [Cholestatin] Cough     Seasonal Allergies         Objective     Current Vitals:   Blood Pressure: 112/78 (07/17/20 1315)  Pulse: 86 (07/17/20 1315)  Temperature: 98 °F (36 7 °C) (07/17/20 1315)  Temp Source: Tympanic (07/17/20 1315)  Respirations: 14 (07/17/20 1315)  Height: 5' 2" (157 5 cm) (07/17/20 1315)  Weight - Scale: 103 kg (227 lb 12 8 oz) (07/17/20 1315)        Physical Exam   Constitutional: She is oriented to person, place, and time  She appears well-developed  HENT:   Head: Normocephalic  Eyes: EOM are normal    Neck: Normal range of motion     Cardiovascular: Normal rate and normal heart sounds  Pulmonary/Chest: Effort normal and breath sounds normal    Abdominal: Soft  She exhibits no distension  There is no tenderness  Musculoskeletal: Normal range of motion  Neurological: She is alert and oriented to person, place, and time  Skin: Skin is warm and dry  Psychiatric: She has a normal mood and affect  Her behavior is normal  Judgment and thought content normal        Lab Results: I have personally reviewed pertinent lab results  Imaging: I have personally reviewed pertinent reports  EKG, Pathology, and Other Studies: I have personally reviewed pertinent reports  Code Status: Level 1    Assessment:  45 y o  yo morbidly obese female found to be a good candidate to undergo a weight loss operation upon being enrolled here at the [address] [place] [address]  She has completed all of the preoperative process for bariatric surgery  Plan:  - Plan for laparoscopic possible open SG with intraoperative EGD  - I discussed the risk of rescheduling/canceling the case if goal weight not met    -She has to be at 219# on DOS  - consent signed  - preop orders placed

## 2020-07-17 NOTE — PROGRESS NOTES
Bariatric Nutrition Assessment Note  Type of surgery    Preop (No Weight checks)-approved for surgery and awaiting a date  Surgery Date: August 4th, 2020  Surgeon: Dr Magalis Andrews  45 y o   female     Wt with BMI of 25: 136#  Pre-Op Excess Wt: 96lbs  Wt: 227 8lbs  Ht:  62"  BMI:  41 7lbs  Day of surgery goal weight:  219lbs  Review of History and Medications   Past Medical History:   Diagnosis Date    Acid reflux     Asthma     Baker cyst, left     Constipation     Cough     Digestive disorder     Headache     History of mammogram 07/17/2018    Increased urinary frequency     Joint pain     Morbid obesity with BMI of 40 0-44 9, adult ([facility] [address] )     Seasonal allergies     Shortness of breath      Past Surgical History:   Procedure Laterality Date    CARPAL TUNNEL RELEASE      ENDOMETRIAL ABLATION W/ Stephanie Ruano  2014    Laparscopy, D&C, Novasure ablation    HYSTERECTOMY  12/20/2016    B/L salpingectomy    PARTIAL HYSTERECTOMY      TENDON REPAIR  2016    TUBAL LIGATION  08/06/2010    Laparoscopy tubal cautery     Social History     Socioeconomic History    Marital status: Single     Spouse name: Not on file    Number of children: Not on file    Years of education: 15    Highest education level: Not on file   Occupational History    Occupation: Unemployed    Social Needs    Financial resource strain: Not on file    Food insecurity:     Worry: Not on file     Inability: Not on file   Mulu needs:     Medical: Not on file     Non-medical: Not on file   Tobacco Use    Smoking status: Never Smoker    Smokeless tobacco: Never Used   Substance and Sexual Activity    Alcohol use: Yes     Comment: 0cc    Drug use: No    Sexual activity: Not on file   Lifestyle    Physical activity:     Days per week: Not on file     Minutes per session: Not on file    Stress: Not on file   Relationships    Social connections:     Talks on phone: Not on file Gets together: Not on file     Attends Latter day service: Not on file     Active member of club or organization: Not on file     Attends meetings of clubs or organizations: Not on file     Relationship status: Not on file    Intimate partner violence:     Fear of current or ex partner: Not on file     Emotionally abused: Not on file     Physically abused: Not on file     Forced sexual activity: Not on file   Other Topics Concern    Not on file   Social History Narrative    · Most recent tobacco use screenin2019      · Do you currently or have you served in [employer]:   No      · Were you activated, into active duty, as a member of the [employer] or as a Reservist:   No      · Sexual orientation:   Heterosexual      · Exercise level:   None      · Diet:   Regular      · General stress level:   Low      · Caffeine intake: Moderate      · Guns present in home:   No      · Seat belts used routinely:   Yes      · Sunscreen used routinely:   Yes      · Smoke alarm in home: Yes     · Advance directive: Yes      · Live alone or with others:   with others      · Are there stairs in your home: Yes      · International travel:   none     As per Mireya Rucker        Current Outpatient Medications:     acetaminophen-codeine (TYLENOL #3) 300-30 mg per tablet, Take 1 tablet by mouth every 4 (four) hours as needed for moderate pain, Disp: , Rfl:     clonazePAM (KlonoPIN) 0 5 mg tablet, Take 0 5 mg by mouth 2 (two) times a day as needed for anxiety , Disp: , Rfl:     fluticasone (FLONASE) 50 mcg/act nasal spray, 1 spray into each nostril daily at bedtime , Disp: , Rfl:     fluticasone-salmeterol (ADVAIR) 250-50 mcg/dose inhaler, Inhale 1 puff every 12 (twelve) hours, Disp: , Rfl:     Ibuprofen (ADVIL PO), Take by mouth Advil liquid gels take 600 mg daily  , Disp: , Rfl:     meloxicam (MOBIC) 15 mg tablet, Take 1 tablet by mouth, Disp: , Rfl:     sulfamethoxazole-trimethoprim (BACTRIM DS) 800-160 mg per tablet, Take 1 tablet by mouth 2 (two) times a day, Disp: , Rfl:   Food Intake and Lifestyle Assessment   Food Intake Assessment completed via food log brought by patient and usual work day  Work has been busy  Only gets three 20 min breaks  At work by 5:30am, up at 4:15am   Breakfast: 4:40am-Protein shake  Snack: 9am-yogurt and peach  Lunch: 12pm-tuna pouch and hard boiled egg or skipped yesterday  Snack: no snack while at work  Leaves work between [place], 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  Dinner: 5/6pm: last night:  Steak 3oz, baked potato and broccoli/asparagus  On a day off:  Chicken, rice and broccoli or steak and green beans and baked potato, pork chop and corn and rice or pasta and meatballs  Snack: nothing last night  Gets to bed around 8:30pm on a work night  Beverage intake: water and crystal light, no coffee or tea  Protein supplement: Premier peaches and cream  Estimated protein intake per day: 60gm  Estimated fluid intake per day: 48oz water, crystal light, no more iced tea since hasn't been going food shopping as much  Meals eaten away from home: down to 1-2 times per week  Typical meal pattern: 2-3 meals per day and 1 snack per day in the evening  Eating Behaviors: Consumption of high calorie/ high fat foods, Consumption of high calorie beverages and Large portion sizes (improved)  Food allergies or intolerances: Allergies   Allergen Reactions    Seasonal Ic [Cholestatin] Cough     Seasonal Allergies       Cultural or Zoroastrian considerations: none    Physical Assessment  Physical Activity  Types of exercise: None because knee has been hurting and foot has been hurting      On her feet all day at work  Current physical limitations: work schedule and knee pain    Psychosocial Assessment   Support systems: children friend(s)  Socioeconomic factors: none    Nutrition Diagnosis  Diagnosis: Overweight / Obesity (NC-3 3)  Related to: Physical inactivity and Excessive energy intake  As Evidenced by: BMI >25     Nutrition Prescription: Recommend the following diet  Regular    Interventions and Teaching   Discussed pre-op and post-op nutrition guidelines  Patient educated and handouts provided  Adequate hydration  Exercise  Suggestions for pre-op diet  Nutrition considerations after surgery  Pre-op diet  Protein supplements  Meal planning and preparation  Dietary and lifestyle changes  Possible problems with poor eating habits  Intuitive eating  Techniques for self monitoring and keeping daily food journal    Education provided to: patient    Barriers to learning: No barriers identified  Readiness to change: preparation    Prior research on procedure: internet, discussed with provider and friends or family    Comprehension: verbalizes understanding     Expected Compliance: good  Evaluation / Monitoring  Dietitian to Monitor: Eating pattern as discussed Tolerance of nutrition prescription Body weight Physical activity  Pt here for final H&P with surgeon, surgery scheduled for August 4th  Pt to start pre-op liver shrinking diet on 7/21  Has premier protein drinks at home  Pt is aware her day of surgery goal weight is 219lbs  She can now go below a BMI of 35 since she has a dx of JANETTE therefore can follow the diet as prescribed  Pt had pre-op diet education this morning, has ERAS pre-surgery drinks, and already purchased her vitamins  She has no questions at this time    Goals  Start liver shrinking diet on 7/21 1st post-op to be scheduled for 10 days post-op  Time Spent:   30 mins

## 2020-07-17 NOTE — H&P
H&P Exam - Bariatric Surgery   Milagros Husain 45 y o  female MRN: [id number]   Encounter: [id number]      HPI:    45 y o  yo morbidly obese female found to be a good candidate to undergo a weight loss operation upon being enrolled here at the [facility]   She has been pre certified to undergo a Laparoscopic sleeve gastrectomy  I have discussed with the patient that 20-30% of patient's may experience worsened GERD and 18% risk of Greenwood's esophagitis requiring surveillance EGDs after a sleeve gastrectomy  The patient understands this fully and accepts the risks to undergo a sleeve gastrectomy  ++Suffers from anxiety, asthma, GERD (diet); JANETTE   S/p CTR R, R foot sx, lap partial hysterectomy    Here today to review her pre op test results  Has been medically cleared for the procedure  I have explained our Enhanced Recovery After Bariatric Surgery (ERABS) protocol and benefits including preoperative, intraoperative and postoperative elements  I have discussed with her at length the risks and benefits of the operation and reiterated the components of our multidisciplinary program and the importance of compliance and follow up in the post operative period  Although there is a great statistical chance of improvement or even resolution of most of her associated comorbidities, the results vary from patient to patient and they largely depend on his commitment  The patient was also instructed with regards to the importance of behavior modification, nutritional counseling, support meeting attendance and lifestyle changes that are important to ensure success  She was given the opportunity to ask questions and I have answered all of them  I have addressed with the patient the level of CODE STATUS for this hospital stay and after explaining the different options currently she wishes to be a Level I  She understands and wishes to proceed      She has to be at 219# on DOS    Review of Systems   Constitutional: Negative  Respiratory: Negative  Cardiovascular: Negative  Gastrointestinal: Negative  Musculoskeletal: Negative  Neurological: Negative  All other systems reviewed and are negative  Historical Information   Past Medical History:   Diagnosis Date    Acid reflux     Asthma     Baker cyst, left     Constipation     Cough     Digestive disorder     Headache     History of mammogram 07/17/2018    Increased urinary frequency     Joint pain     Morbid obesity with BMI of 40 0-44 9, adult (HCC)     Seasonal allergies     Shortness of breath      Past Surgical History:   Procedure Laterality Date    CARPAL TUNNEL RELEASE      ENDOMETRIAL ABLATION W/ Redge Bucy  2014    Laparscopy, D&C, Novasure ablation    HYSTERECTOMY  12/20/2016    B/L salpingectomy    PARTIAL HYSTERECTOMY      TENDON REPAIR  2016    TUBAL LIGATION  08/06/2010    Laparoscopy tubal cautery     Social History   Social History     Substance and Sexual Activity   Alcohol Use Yes    Comment: 0cc     Social History     Substance and Sexual Activity   Drug Use No     Social History     Tobacco Use   Smoking Status Never Smoker   Smokeless Tobacco Never Used     Family History: non-contributory    Meds/Allergies   all medications and allergies reviewed  Allergies   Allergen Reactions    Seasonal Ic [Cholestatin] Cough     Seasonal Allergies         Objective     Current Vitals:   Blood Pressure: 112/78 (07/17/20 1315)  Pulse: 86 (07/17/20 1315)  Temperature: 98 °F (36 7 °C) (07/17/20 1315)  Temp Source: Tympanic (07/17/20 1315)  Respirations: 14 (07/17/20 1315)  Height: 5' 2" (157 5 cm) (07/17/20 1315)  Weight - Scale: 103 kg (227 lb 12 8 oz) (07/17/20 1315)        Physical Exam   Constitutional: She is oriented to person, place, and time  She appears well-developed  HENT:   Head: Normocephalic  Eyes: EOM are normal    Neck: Normal range of motion     Cardiovascular: Normal rate and normal heart sounds  Pulmonary/Chest: Effort normal and breath sounds normal    Abdominal: Soft  She exhibits no distension  There is no tenderness  Musculoskeletal: Normal range of motion  Neurological: She is alert and oriented to person, place, and time  Skin: Skin is warm and dry  Psychiatric: She has a normal mood and affect  Her behavior is normal  Judgment and thought content normal        Lab Results: I have personally reviewed pertinent lab results  Imaging: I have personally reviewed pertinent reports  EKG, Pathology, and Other Studies: I have personally reviewed pertinent reports  Code Status: Level 1    Assessment:  45 y o  yo morbidly obese female found to be a good candidate to undergo a weight loss operation upon being enrolled here at the [address] [place] [address]  She has completed all of the preoperative process for bariatric surgery  Plan:  - Plan for laparoscopic possible open SG with intraoperative EGD  - I discussed the risk of rescheduling/canceling the case if goal weight not met    -She has to be at 219# on DOS  - consent signed  - preop orders placed

## 2020-07-17 NOTE — H&P (VIEW-ONLY)
H&P Exam - Bariatric Surgery   Toby Loza 45 y o  female MRN: [id number]   Encounter: [id number]      HPI:    45 y o  yo morbidly obese female found to be a good candidate to undergo a weight loss operation upon being enrolled here at the [facility]   She has been pre certified to undergo a Laparoscopic sleeve gastrectomy  I have discussed with the patient that 20-30% of patient's may experience worsened GERD and 18% risk of Greenwood's esophagitis requiring surveillance EGDs after a sleeve gastrectomy  The patient understands this fully and accepts the risks to undergo a sleeve gastrectomy  ++Suffers from anxiety, asthma, GERD (diet); JANETTE   S/p CTR R, R foot sx, lap partial hysterectomy    Here today to review her pre op test results  Has been medically cleared for the procedure  I have explained our Enhanced Recovery After Bariatric Surgery (ERABS) protocol and benefits including preoperative, intraoperative and postoperative elements  I have discussed with her at length the risks and benefits of the operation and reiterated the components of our multidisciplinary program and the importance of compliance and follow up in the post operative period  Although there is a great statistical chance of improvement or even resolution of most of her associated comorbidities, the results vary from patient to patient and they largely depend on his commitment  The patient was also instructed with regards to the importance of behavior modification, nutritional counseling, support meeting attendance and lifestyle changes that are important to ensure success  She was given the opportunity to ask questions and I have answered all of them  I have addressed with the patient the level of CODE STATUS for this hospital stay and after explaining the different options currently she wishes to be a Level I  She understands and wishes to proceed      She has to be at 219# on DOS    Review of Systems   Constitutional: Negative  Respiratory: Negative  Cardiovascular: Negative  Gastrointestinal: Negative  Musculoskeletal: Negative  Neurological: Negative  All other systems reviewed and are negative  Historical Information   Past Medical History:   Diagnosis Date    Acid reflux     Asthma     Baker cyst, left     Constipation     Cough     Digestive disorder     Headache     History of mammogram 07/17/2018    Increased urinary frequency     Joint pain     Morbid obesity with BMI of 40 0-44 9, adult (HCC)     Seasonal allergies     Shortness of breath      Past Surgical History:   Procedure Laterality Date    CARPAL TUNNEL RELEASE      ENDOMETRIAL ABLATION W/ [place]  2014    Laparscopy, D&C, Novasure ablation    HYSTERECTOMY  12/20/2016    B/L salpingectomy    PARTIAL HYSTERECTOMY      TENDON REPAIR  2016    TUBAL LIGATION  08/06/2010    Laparoscopy tubal cautery     Social History   Social History     Substance and Sexual Activity   Alcohol Use Yes    Comment: 0cc     Social History     Substance and Sexual Activity   Drug Use No     Social History     Tobacco Use   Smoking Status Never Smoker   Smokeless Tobacco Never Used     Family History: non-contributory    Meds/Allergies   all medications and allergies reviewed  Allergies   Allergen Reactions    Seasonal Ic [Cholestatin] Cough     Seasonal Allergies         Objective     Current Vitals:   Blood Pressure: 112/78 (07/17/20 1315)  Pulse: 86 (07/17/20 1315)  Temperature: 98 °F (36 7 °C) (07/17/20 1315)  Temp Source: Tympanic (07/17/20 1315)  Respirations: 14 (07/17/20 1315)  Height: 5' 2" (157 5 cm) (07/17/20 1315)  Weight - Scale: 103 kg (227 lb 12 8 oz) (07/17/20 1315)        Physical Exam   Constitutional: She is oriented to person, place, and time  She appears well-developed  HENT:   Head: Normocephalic  Eyes: EOM are normal    Neck: Normal range of motion     Cardiovascular: Normal rate and normal heart sounds  Pulmonary/Chest: Effort normal and breath sounds normal    Abdominal: Soft  She exhibits no distension  There is no tenderness  Musculoskeletal: Normal range of motion  Neurological: She is alert and oriented to person, place, and time  Skin: Skin is warm and dry  Psychiatric: She has a normal mood and affect  Her behavior is normal  Judgment and thought content normal        Lab Results: I have personally reviewed pertinent lab results  Imaging: I have personally reviewed pertinent reports  EKG, Pathology, and Other Studies: I have personally reviewed pertinent reports  Code Status: Level 1    Assessment:  45 y o  yo morbidly obese female found to be a good candidate to undergo a weight loss operation upon being enrolled here at the [address] [place] [address]  She has completed all of the preoperative process for bariatric surgery  Plan:  - Plan for laparoscopic possible open SG with intraoperative EGD  - I discussed the risk of rescheduling/canceling the case if goal weight not met    -She has to be at 219# on DOS  - consent signed  - preop orders placed

## 2020-07-29 DIAGNOSIS — Z01.818 PREOP TESTING: ICD-10-CM

## 2020-07-29 PROCEDURE — U0003 INFECTIOUS AGENT DETECTION BY NUCLEIC ACID (DNA OR RNA); SEVERE ACUTE RESPIRATORY SYNDROME CORONAVIRUS 2 (SARS-COV-2) (CORONAVIRUS DISEASE [COVID-19]), AMPLIFIED PROBE TECHNIQUE, MAKING USE OF HIGH THROUGHPUT TECHNOLOGIES AS DESCRIBED BY CMS-2020-01-R: HCPCS

## 2020-07-29 NOTE — PRE-PROCEDURE INSTRUCTIONS
My Surgical Experience    The following information was developed to assist you to prepare for your operation  What do I need to do before coming to the hospital?   Arrange for a responsible person to drive you to and from the hospital    Arrange care for your children at home  Children are not allowed in the recovery areas of the hospital   Plan to wear clothing that is easy to put on and take off  If you are having shoulder surgery, wear a shirt that buttons or zippers in the front  Bathing  o Shower the evening before and the morning of your surgery with an antibacterial soap  Please refer to the Pre Op Showering Instructions for Surgery Patients Sheet   o Remove nail polish and all body piercing jewelry  o Do not shave any body part for at least 24 hours before surgery-this includes face, arms, legs and upper body  Food  o Nothing to eat or drink after midnight the night before your surgery  This includes candy and chewing gum  o Exception: If your surgery is after 12:00pm (noon), you may have clear liquids such as 7-Up®, ginger ale, apple or cranberry juice, Jell-O®, water, or clear broth until 8:00 am  o Do not drink milk or juice with pulp on the morning before surgery  o Do not drink alcohol 24 hours before surgery  Medicine  o Follow instructions you received from your surgeon about which medicines you may take on the day of surgery  o If instructed to take medicine on the morning of surgery, take pills with just a small sip of water  Call your prescribing doctor for specific infroamtion on what to do if you take insulin    What should I bring to the hospital?    Bring:  Ruben Gallegos or a walker, if you have them, for foot or knee surgery   A list of the daily medicines, vitamins, minerals, herbals and nutritional supplements you take   Include the dosages of medicines and the time you take them each day   Glasses, dentures or hearing aids   Minimal clothing; you will be wearing hospital sleepwear   Photo ID; required to verify your identity   If you have a Living Will or Power of , bring a copy of the documents   If you have an ostomy, bring an extra pouch and any supplies you use    Do not bring   Medicines or inhalers   Money, valuables or jewelry    What other information should I know about the day of surgery?  Notify your surgeons if you develop a cold, sore throat, cough, fever, rash or any other illness   Report to the Ambulatory Surgical/Same Day Surgery Unit   You will be instructed to stop at Registration only if you have not been pre-registered   Inform your  fi they do not stay that they will be asked by the staff to leave a phone number where they can be reached   Be available to be reached before surgery  In the event the operating room schedule changes, you may be asked to come in earlier or later than expected    *It is important to tell your doctor and others involved in your health care if you are taking or have been taking any non-prescription drugs, vitamins, minerals, herbals or other nutritional supplements  Any of these may interact with some food or medicines and cause a reaction      Pre-Surgery Instructions:   Medication Instructions    clonazePAM (KlonoPIN) 0 5 mg tablet Instructed patient per Anesthesia Guidelines   fluticasone (FLONASE) 50 mcg/act nasal spray Instructed patient per Anesthesia Guidelines   fluticasone-salmeterol (ADVAIR) 250-50 mcg/dose inhaler Instructed patient per Anesthesia Guidelines   Ibuprofen (ADVIL PO) Instructed patient per Anesthesia Guidelines

## 2020-07-30 LAB — SARS-COV-2 RNA SPEC QL NAA+PROBE: NOT DETECTED

## 2020-08-03 ENCOUNTER — ANESTHESIA EVENT (OUTPATIENT)
Dept: PERIOP | Facility: HOSPITAL | Age: 45
DRG: 621 | End: 2020-08-03
Payer: COMMERCIAL

## 2020-08-04 ENCOUNTER — HOSPITAL ENCOUNTER (INPATIENT)
Facility: HOSPITAL | Age: 45
LOS: 1 days | Discharge: HOME | DRG: 621 | End: 2020-08-05
Attending: SURGERY | Admitting: SURGERY
Payer: COMMERCIAL

## 2020-08-04 ENCOUNTER — ANESTHESIA (OUTPATIENT)
Dept: PERIOP | Facility: HOSPITAL | Age: 45
DRG: 621 | End: 2020-08-04
Payer: COMMERCIAL

## 2020-08-04 DIAGNOSIS — E66.01 MORBID OBESITY (HCC): ICD-10-CM

## 2020-08-04 DIAGNOSIS — E78.5 HYPERLIPEMIA: ICD-10-CM

## 2020-08-04 DIAGNOSIS — Z98.84 S/P LAPAROSCOPIC SLEEVE GASTRECTOMY: Primary | ICD-10-CM

## 2020-08-04 DIAGNOSIS — E11.9 DIABETES MELLITUS (HCC): ICD-10-CM

## 2020-08-04 DIAGNOSIS — G47.33 OBSTRUCTIVE SLEEP APNEA: ICD-10-CM

## 2020-08-04 PROBLEM — E66.813 CLASS 3 SEVERE OBESITY IN ADULT (HCC): Status: ACTIVE | Noted: 2020-08-04

## 2020-08-04 PROBLEM — J45.20 MILD INTERMITTENT ASTHMA WITHOUT COMPLICATION: Status: ACTIVE | Noted: 2020-08-04

## 2020-08-04 PROBLEM — Z90.710 HISTORY OF HYSTERECTOMY: Status: ACTIVE | Noted: 2020-08-04

## 2020-08-04 LAB — GLUCOSE SERPL-MCNC: 214 MG/DL (ref 65–140)

## 2020-08-04 PROCEDURE — 0DB64Z3 EXCISION OF STOMACH, PERCUTANEOUS ENDOSCOPIC APPROACH, VERTICAL: ICD-10-PCS | Performed by: SURGERY

## 2020-08-04 PROCEDURE — 43775 LAP SLEEVE GASTRECTOMY: CPT | Performed by: SURGERY

## 2020-08-04 PROCEDURE — 88307 TISSUE EXAM BY PATHOLOGIST: CPT | Performed by: PATHOLOGY

## 2020-08-04 PROCEDURE — 87081 CULTURE SCREEN ONLY: CPT | Performed by: SURGERY

## 2020-08-04 PROCEDURE — 82948 REAGENT STRIP/BLOOD GLUCOSE: CPT

## 2020-08-04 PROCEDURE — C9290 INJ, BUPIVACAINE LIPOSOME: HCPCS | Performed by: SURGERY

## 2020-08-04 DEVICE — SEAMGUARD STPL REINF ENDO GIA ULTRA UNIV 60 PURPLE: Type: IMPLANTABLE DEVICE | Site: ABDOMEN | Status: FUNCTIONAL

## 2020-08-04 DEVICE — SEAMGUARD STPL REINF ENDO GIA ULTRA UNV 60 BLACK: Type: IMPLANTABLE DEVICE | Site: ABDOMEN | Status: FUNCTIONAL

## 2020-08-04 RX ORDER — SODIUM CHLORIDE, SODIUM LACTATE, POTASSIUM CHLORIDE, CALCIUM CHLORIDE 600; 310; 30; 20 MG/100ML; MG/100ML; MG/100ML; MG/100ML
125 INJECTION, SOLUTION INTRAVENOUS CONTINUOUS
Status: DISCONTINUED | OUTPATIENT
Start: 2020-08-04 | End: 2020-08-04 | Stop reason: SDUPTHER

## 2020-08-04 RX ORDER — SIMETHICONE 80 MG
80 TABLET,CHEWABLE ORAL EVERY 12 HOURS SCHEDULED
Status: DISCONTINUED | OUTPATIENT
Start: 2020-08-04 | End: 2020-08-04

## 2020-08-04 RX ORDER — SODIUM CHLORIDE, SODIUM LACTATE, POTASSIUM CHLORIDE, CALCIUM CHLORIDE 600; 310; 30; 20 MG/100ML; MG/100ML; MG/100ML; MG/100ML
75 INJECTION, SOLUTION INTRAVENOUS CONTINUOUS
Status: DISCONTINUED | OUTPATIENT
Start: 2020-08-04 | End: 2020-08-05 | Stop reason: HOSPADM

## 2020-08-04 RX ORDER — OXYCODONE HCL 5 MG/5 ML
10 SOLUTION, ORAL ORAL EVERY 4 HOURS PRN
Status: DISCONTINUED | OUTPATIENT
Start: 2020-08-04 | End: 2020-08-05 | Stop reason: HOSPADM

## 2020-08-04 RX ORDER — SCOLOPAMINE TRANSDERMAL SYSTEM 1 MG/1
1 PATCH, EXTENDED RELEASE TRANSDERMAL ONCE
Status: DISCONTINUED | OUTPATIENT
Start: 2020-08-04 | End: 2020-08-05 | Stop reason: HOSPADM

## 2020-08-04 RX ORDER — PROPOFOL 10 MG/ML
INJECTION, EMULSION INTRAVENOUS CONTINUOUS PRN
Status: DISCONTINUED | OUTPATIENT
Start: 2020-08-04 | End: 2020-08-04

## 2020-08-04 RX ORDER — BUPIVACAINE HYDROCHLORIDE 5 MG/ML
INJECTION, SOLUTION EPIDURAL; INTRACAUDAL AS NEEDED
Status: DISCONTINUED | OUTPATIENT
Start: 2020-08-04 | End: 2020-08-04 | Stop reason: HOSPADM

## 2020-08-04 RX ORDER — PROPOFOL 10 MG/ML
INJECTION, EMULSION INTRAVENOUS AS NEEDED
Status: DISCONTINUED | OUTPATIENT
Start: 2020-08-04 | End: 2020-08-04

## 2020-08-04 RX ORDER — ONDANSETRON 2 MG/ML
INJECTION INTRAMUSCULAR; INTRAVENOUS AS NEEDED
Status: DISCONTINUED | OUTPATIENT
Start: 2020-08-04 | End: 2020-08-04

## 2020-08-04 RX ORDER — CLONAZEPAM 0.5 MG/1
0.5 TABLET ORAL 2 TIMES DAILY PRN
Status: DISCONTINUED | OUTPATIENT
Start: 2020-08-05 | End: 2020-08-05 | Stop reason: HOSPADM

## 2020-08-04 RX ORDER — HYDRALAZINE HYDROCHLORIDE 20 MG/ML
5 INJECTION INTRAMUSCULAR; INTRAVENOUS ONCE
Status: COMPLETED | OUTPATIENT
Start: 2020-08-04 | End: 2020-08-04

## 2020-08-04 RX ORDER — MAGNESIUM HYDROXIDE 1200 MG/15ML
LIQUID ORAL AS NEEDED
Status: DISCONTINUED | OUTPATIENT
Start: 2020-08-04 | End: 2020-08-04 | Stop reason: HOSPADM

## 2020-08-04 RX ORDER — ACETAMINOPHEN 325 MG/1
975 TABLET ORAL EVERY 6 HOURS SCHEDULED
Status: DISCONTINUED | OUTPATIENT
Start: 2020-08-04 | End: 2020-08-05 | Stop reason: HOSPADM

## 2020-08-04 RX ORDER — SIMETHICONE 80 MG
80 TABLET,CHEWABLE ORAL EVERY 12 HOURS SCHEDULED
Status: DISCONTINUED | OUTPATIENT
Start: 2020-08-05 | End: 2020-08-05 | Stop reason: HOSPADM

## 2020-08-04 RX ORDER — ACETAMINOPHEN 325 MG/1
975 TABLET ORAL ONCE
Status: COMPLETED | OUTPATIENT
Start: 2020-08-04 | End: 2020-08-04

## 2020-08-04 RX ORDER — MIDAZOLAM HYDROCHLORIDE 2 MG/2ML
INJECTION, SOLUTION INTRAMUSCULAR; INTRAVENOUS AS NEEDED
Status: DISCONTINUED | OUTPATIENT
Start: 2020-08-04 | End: 2020-08-04

## 2020-08-04 RX ORDER — NEOSTIGMINE METHYLSULFATE 1 MG/ML
INJECTION INTRAVENOUS AS NEEDED
Status: DISCONTINUED | OUTPATIENT
Start: 2020-08-04 | End: 2020-08-04

## 2020-08-04 RX ORDER — MORPHINE SULFATE 4 MG/ML
4 INJECTION, SOLUTION INTRAMUSCULAR; INTRAVENOUS EVERY 2 HOUR PRN
Status: DISCONTINUED | OUTPATIENT
Start: 2020-08-04 | End: 2020-08-05

## 2020-08-04 RX ORDER — DEXAMETHASONE SODIUM PHOSPHATE 4 MG/ML
INJECTION, SOLUTION INTRA-ARTICULAR; INTRALESIONAL; INTRAMUSCULAR; INTRAVENOUS; SOFT TISSUE AS NEEDED
Status: DISCONTINUED | OUTPATIENT
Start: 2020-08-04 | End: 2020-08-04

## 2020-08-04 RX ORDER — ROCURONIUM BROMIDE 10 MG/ML
INJECTION, SOLUTION INTRAVENOUS AS NEEDED
Status: DISCONTINUED | OUTPATIENT
Start: 2020-08-04 | End: 2020-08-04

## 2020-08-04 RX ORDER — KETOROLAC TROMETHAMINE 30 MG/ML
15 INJECTION, SOLUTION INTRAMUSCULAR; INTRAVENOUS EVERY 6 HOURS SCHEDULED
Status: DISCONTINUED | OUTPATIENT
Start: 2020-08-04 | End: 2020-08-05 | Stop reason: HOSPADM

## 2020-08-04 RX ORDER — CELECOXIB 100 MG/1
200 CAPSULE ORAL ONCE
Status: COMPLETED | OUTPATIENT
Start: 2020-08-04 | End: 2020-08-04

## 2020-08-04 RX ORDER — CEFAZOLIN SODIUM 2 G/50ML
2000 SOLUTION INTRAVENOUS ONCE
Status: COMPLETED | OUTPATIENT
Start: 2020-08-04 | End: 2020-08-04

## 2020-08-04 RX ORDER — FENTANYL CITRATE 50 UG/ML
INJECTION, SOLUTION INTRAMUSCULAR; INTRAVENOUS AS NEEDED
Status: DISCONTINUED | OUTPATIENT
Start: 2020-08-04 | End: 2020-08-04

## 2020-08-04 RX ORDER — GLYCOPYRROLATE 0.2 MG/ML
INJECTION INTRAMUSCULAR; INTRAVENOUS AS NEEDED
Status: DISCONTINUED | OUTPATIENT
Start: 2020-08-04 | End: 2020-08-04

## 2020-08-04 RX ORDER — FLUTICASONE FUROATE AND VILANTEROL 200; 25 UG/1; UG/1
1 POWDER RESPIRATORY (INHALATION)
Status: DISCONTINUED | OUTPATIENT
Start: 2020-08-05 | End: 2020-08-05 | Stop reason: HOSPADM

## 2020-08-04 RX ORDER — GABAPENTIN 300 MG/1
600 CAPSULE ORAL ONCE
Status: COMPLETED | OUTPATIENT
Start: 2020-08-04 | End: 2020-08-04

## 2020-08-04 RX ORDER — HYDROMORPHONE HCL/PF 1 MG/ML
0.5 SYRINGE (ML) INJECTION
Status: DISCONTINUED | OUTPATIENT
Start: 2020-08-04 | End: 2020-08-04 | Stop reason: HOSPADM

## 2020-08-04 RX ORDER — OXYCODONE HCL 5 MG/5 ML
5 SOLUTION, ORAL ORAL EVERY 4 HOURS PRN
Status: DISCONTINUED | OUTPATIENT
Start: 2020-08-04 | End: 2020-08-05 | Stop reason: HOSPADM

## 2020-08-04 RX ORDER — SODIUM CHLORIDE 9 MG/ML
INJECTION, SOLUTION INTRAVENOUS CONTINUOUS PRN
Status: DISCONTINUED | OUTPATIENT
Start: 2020-08-04 | End: 2020-08-04

## 2020-08-04 RX ORDER — ONDANSETRON 2 MG/ML
4 INJECTION INTRAMUSCULAR; INTRAVENOUS ONCE AS NEEDED
Status: COMPLETED | OUTPATIENT
Start: 2020-08-04 | End: 2020-08-04

## 2020-08-04 RX ORDER — ONDANSETRON 2 MG/ML
4 INJECTION INTRAMUSCULAR; INTRAVENOUS EVERY 6 HOURS PRN
Status: DISCONTINUED | OUTPATIENT
Start: 2020-08-04 | End: 2020-08-05 | Stop reason: HOSPADM

## 2020-08-04 RX ORDER — LIDOCAINE HYDROCHLORIDE 10 MG/ML
INJECTION, SOLUTION EPIDURAL; INFILTRATION; INTRACAUDAL; PERINEURAL AS NEEDED
Status: DISCONTINUED | OUTPATIENT
Start: 2020-08-04 | End: 2020-08-04

## 2020-08-04 RX ORDER — PROMETHAZINE HYDROCHLORIDE 25 MG/ML
25 INJECTION, SOLUTION INTRAMUSCULAR; INTRAVENOUS EVERY 6 HOURS PRN
Status: DISCONTINUED | OUTPATIENT
Start: 2020-08-04 | End: 2020-08-05 | Stop reason: HOSPADM

## 2020-08-04 RX ORDER — HYDRALAZINE HYDROCHLORIDE 20 MG/ML
10 INJECTION INTRAMUSCULAR; INTRAVENOUS ONCE
Status: COMPLETED | OUTPATIENT
Start: 2020-08-04 | End: 2020-08-04

## 2020-08-04 RX ORDER — METOCLOPRAMIDE HYDROCHLORIDE 5 MG/ML
10 INJECTION INTRAMUSCULAR; INTRAVENOUS EVERY 6 HOURS PRN
Status: DISCONTINUED | OUTPATIENT
Start: 2020-08-04 | End: 2020-08-05 | Stop reason: HOSPADM

## 2020-08-04 RX ADMIN — ACETAMINOPHEN 975 MG: 325 TABLET, FILM COATED ORAL at 18:03

## 2020-08-04 RX ADMIN — FAMOTIDINE 20 MG: 10 INJECTION INTRAVENOUS at 11:40

## 2020-08-04 RX ADMIN — ACETAMINOPHEN 975 MG: 325 TABLET, FILM COATED ORAL at 05:37

## 2020-08-04 RX ADMIN — GLYCOPYRROLATE 0.8 MG: 0.2 INJECTION, SOLUTION INTRAMUSCULAR; INTRAVENOUS at 08:55

## 2020-08-04 RX ADMIN — KETOROLAC TROMETHAMINE 15 MG: 30 INJECTION, SOLUTION INTRAMUSCULAR at 15:05

## 2020-08-04 RX ADMIN — ROCURONIUM BROMIDE 10 MG: 10 INJECTION, SOLUTION INTRAVENOUS at 08:35

## 2020-08-04 RX ADMIN — HYDRALAZINE HYDROCHLORIDE 10 MG: 20 INJECTION INTRAMUSCULAR; INTRAVENOUS at 10:57

## 2020-08-04 RX ADMIN — KETOROLAC TROMETHAMINE 15 MG: 30 INJECTION, SOLUTION INTRAMUSCULAR at 21:08

## 2020-08-04 RX ADMIN — REMIFENTANIL HYDROCHLORIDE 0.1 MCG/KG/MIN: 1 INJECTION, POWDER, LYOPHILIZED, FOR SOLUTION INTRAVENOUS at 07:40

## 2020-08-04 RX ADMIN — HYDROMORPHONE HYDROCHLORIDE 0.5 MG: 1 INJECTION, SOLUTION INTRAMUSCULAR; INTRAVENOUS; SUBCUTANEOUS at 09:58

## 2020-08-04 RX ADMIN — DEXAMETHASONE SODIUM PHOSPHATE 4 MG: 4 INJECTION, SOLUTION INTRA-ARTICULAR; INTRALESIONAL; INTRAMUSCULAR; INTRAVENOUS; SOFT TISSUE at 08:31

## 2020-08-04 RX ADMIN — ROCURONIUM BROMIDE 20 MG: 10 INJECTION, SOLUTION INTRAVENOUS at 07:55

## 2020-08-04 RX ADMIN — NEOSTIGMINE METHYLSULFATE 4 MG: 1 INJECTION INTRAVENOUS at 08:55

## 2020-08-04 RX ADMIN — PROPOFOL 200 MG: 10 INJECTION, EMULSION INTRAVENOUS at 07:37

## 2020-08-04 RX ADMIN — LIDOCAINE HYDROCHLORIDE 50 MG: 10 INJECTION, SOLUTION EPIDURAL; INFILTRATION; INTRACAUDAL; PERINEURAL at 07:37

## 2020-08-04 RX ADMIN — ENOXAPARIN SODIUM 40 MG: 40 INJECTION SUBCUTANEOUS at 05:39

## 2020-08-04 RX ADMIN — GABAPENTIN 600 MG: 300 CAPSULE ORAL at 05:38

## 2020-08-04 RX ADMIN — HYDROMORPHONE HYDROCHLORIDE 0.5 MG: 1 INJECTION, SOLUTION INTRAMUSCULAR; INTRAVENOUS; SUBCUTANEOUS at 09:45

## 2020-08-04 RX ADMIN — CELECOXIB 200 MG: 100 CAPSULE ORAL at 05:38

## 2020-08-04 RX ADMIN — FAMOTIDINE 20 MG: 10 INJECTION INTRAVENOUS at 18:03

## 2020-08-04 RX ADMIN — HYDRALAZINE HYDROCHLORIDE 5 MG: 20 INJECTION INTRAMUSCULAR; INTRAVENOUS at 16:36

## 2020-08-04 RX ADMIN — SIMETHICONE CHEW TAB 80 MG 80 MG: 80 TABLET ORAL at 16:08

## 2020-08-04 RX ADMIN — PROPOFOL 120 MCG/KG/MIN: 10 INJECTION, EMULSION INTRAVENOUS at 07:38

## 2020-08-04 RX ADMIN — ONDANSETRON 4 MG: 2 INJECTION INTRAMUSCULAR; INTRAVENOUS at 08:31

## 2020-08-04 RX ADMIN — ACETAMINOPHEN 975 MG: 325 TABLET, FILM COATED ORAL at 23:51

## 2020-08-04 RX ADMIN — SODIUM CHLORIDE: 0.9 INJECTION, SOLUTION INTRAVENOUS at 07:38

## 2020-08-04 RX ADMIN — ONDANSETRON 4 MG: 2 INJECTION INTRAMUSCULAR; INTRAVENOUS at 13:16

## 2020-08-04 RX ADMIN — ONDANSETRON 4 MG: 2 INJECTION INTRAMUSCULAR; INTRAVENOUS at 09:20

## 2020-08-04 RX ADMIN — ROCURONIUM BROMIDE 30 MG: 10 INJECTION, SOLUTION INTRAVENOUS at 07:37

## 2020-08-04 RX ADMIN — SODIUM CHLORIDE, SODIUM LACTATE, POTASSIUM CHLORIDE, AND CALCIUM CHLORIDE 75 ML/HR: .6; .31; .03; .02 INJECTION, SOLUTION INTRAVENOUS at 23:55

## 2020-08-04 RX ADMIN — METOCLOPRAMIDE 10 MG: 5 INJECTION, SOLUTION INTRAMUSCULAR; INTRAVENOUS at 15:03

## 2020-08-04 RX ADMIN — SCOPALAMINE 1 PATCH: 1 PATCH, EXTENDED RELEASE TRANSDERMAL at 05:38

## 2020-08-04 RX ADMIN — SODIUM CHLORIDE, SODIUM LACTATE, POTASSIUM CHLORIDE, AND CALCIUM CHLORIDE 125 ML/HR: .6; .31; .03; .02 INJECTION, SOLUTION INTRAVENOUS at 06:03

## 2020-08-04 RX ADMIN — FENTANYL CITRATE 100 MCG: 50 INJECTION, SOLUTION INTRAMUSCULAR; INTRAVENOUS at 07:37

## 2020-08-04 RX ADMIN — SODIUM CHLORIDE, SODIUM LACTATE, POTASSIUM CHLORIDE, AND CALCIUM CHLORIDE 75 ML/HR: .6; .31; .03; .02 INJECTION, SOLUTION INTRAVENOUS at 11:01

## 2020-08-04 RX ADMIN — MIDAZOLAM HYDROCHLORIDE 2 MG: 1 INJECTION, SOLUTION INTRAMUSCULAR; INTRAVENOUS at 07:28

## 2020-08-04 RX ADMIN — SODIUM CHLORIDE, SODIUM LACTATE, POTASSIUM CHLORIDE, AND CALCIUM CHLORIDE: .6; .31; .03; .02 INJECTION, SOLUTION INTRAVENOUS at 08:49

## 2020-08-04 RX ADMIN — CEFAZOLIN SODIUM 2000 MG: 2 SOLUTION INTRAVENOUS at 07:28

## 2020-08-04 NOTE — ANESTHESIA POSTPROCEDURE EVALUATION
Post-Op Assessment Note    CV Status:  Stable  Pain Score: 0    Pain management: adequate     Mental Status:  Awake   Hydration Status:  Stable   PONV Controlled:  None   Airway Patency:  Patent      Post Op Vitals Reviewed: Yes      Staff: CRNA   Comments: spontaneously breathing, HOB @ 30 degrees, vss, simple mask to O2, fully endorsed to recovery w/o AC        No complications documented      BP   170/80   Temp      Pulse  90   Resp   17   SpO2   100

## 2020-08-04 NOTE — ANESTHESIA PREPROCEDURE EVALUATION
Procedure:  LAPAROSCOPIC SLEEVE  GASTRECTOMY (N/A Abdomen)    Relevant Problems   PULMONARY   (+) Mild intermittent asthma without complication        Physical Exam    Airway    Mallampati score: I  TM Distance: >3 FB  Neck ROM: full     Dental   No notable dental hx     Cardiovascular  Rhythm: regular, Rate: normal,     Pulmonary  Breath sounds clear to auscultation,     Other Findings        Anesthesia Plan  ASA Score- 2     Anesthesia Type- general with ASA Monitors  Additional Monitors:   Airway Plan: ETT  Plan Factors-        Patient is not a current smoker  Patient did not smoke on day of surgery  Induction- intravenous  Postoperative Plan- Plan for postoperative opioid use  Planned trial extubation    Informed Consent- Anesthetic plan and risks discussed with patient  I personally reviewed this patient with the CRNA  Discussed and agreed on the Anesthesia Plan with the CRNA  Adia Marie

## 2020-08-04 NOTE — PLAN OF CARE

## 2020-08-04 NOTE — OP NOTE
OPERATIVE REPORT  PATIENT NAME: Cherry Abad    :  1975  MRN: [id number]  Pt Location: [facility] OR ROOM 02    SURGERY DATE: 2020    Surgeon(s) and Role:     * Corinne Blake, MD - Primary     * Beckie Sanchez MD - Assisting     * Mara Baez PA-C - Assisting    Preop Diagnosis:  Morbid obesity ([address] ) [E66 01]  Obstructive sleep apnea [G47 33]  Diabetes mellitus ([address] ) [E11 9]  Hyperlipemia [E78 5]    Post-Op Diagnosis Codes:     * Morbid obesity ([address] ) [E66 01]     * Obstructive sleep apnea [G47 33]     * Diabetes mellitus ([address] ) [E11 9]     * Hyperlipemia [E78 5]    Procedure(s) (LRB):  LAPAROSCOPIC SLEEVE  GASTRECTOMY (N/A)    Specimen(s):  ID Type Source Tests Collected by Time Destination   1 : Portion of Stomach - R/o H  Pylori Tissue Stomach TISSUE EXAM Corinne Blake, MD 2020 0831        Estimated Blood Loss:   20cc    Drains:  * No LDAs found *    Anesthesia Type:   General    Operative Indications: Morbid obesity ([address] ) [E66 01]  Obstructive sleep apnea [G47 33]  Diabetes mellitus ([facility] ) [E11 9]  Hyperlipemia [E78 5]      Operative Findings:  Dense adhesions in LUQ    Complications:   None    Procedure and Technique:  INDICATION:    Cherry Abad is a 45 y o  female with a Body mass index is 40 42 kg/m²  and a long standing history of morbid obesity and inability to lose a significant amount of weight on its own  This patient was found to be a good candidate to undergo a bariatric procedure upon being enrolled here at the [facility] in [facility]  OPERATIVE TECHNIQUE    The patient was taken to the operating room and placed in a supine position  A dose of IV antibiotic prophylaxis that consisted of Ancef 2g was given  Also, 40 mg of lovenox to prevent DVT were administered preoperatively  Sequential compression devices were placed on both lower extremities      After satisfactory general anesthesia induction and endotracheal intubation was achieved, the extremities were secured to prevent neurovascular and musculoskeletal injuries as best as possible  Subsequently, the abdominal wall was prepped and draped in a surgical standard sterile fashion  After a timeout was done and the patient was properly identified and the type of procedure was confirmed a LUQ transverse skin incision was made, and the subcutaneous tissues dissected  A veress needle technique was used for access to the peritoneal cavity and pneumoperitoneum was created to 15mmHg  Access to the peritoneal cavity was gained with an 12mm optical trocar  With this device, we were able to visualize the layers of the abdominal wall, and enter the peritoneal cavity under direct visualization  Under direct laparoscopic visualization, a four quadrant transversus abdominis plane block was performed  Four additional trocars were placed: a 5 mm in the right upper quadrant subcostal position in the anterior axillary line, a 15-mm port was placed in the right flank midclavicular line, a 12-mm port was placed in the left flank position in the midclavicular line and another 12-mm port was placed in the suprumbilical position to the patient's left of the midline  The T-Rey liver retractor was placed in the subxiphoid position through the use of a 5-mm trocar incision  The patient was repositioned to a reverse Trendelenburg position  We proceeded to divide the gastrocolic ligament with the energy device to enter the lesser sac  We continued to divide this ligament along the greater curvature of the stomach towards the angle of His  Special care was taken while dividing the short gastric vessels close to the spleen  This process was completely hemostatic  There were dense adhesions in this area of the LUQ  I had to carefully dissect the stomach from the spleen, diaphragm, and left kushal with harmonic scalpel and blunt dissection  There was complete hemostasis       We then turned to the creation of an elongated and thin gastric pouch  A 36 Macedonian calibration tube was placed by the anesthesia staff into the stomach under our laparoscopic surveillance  Once the tip of the bougie was confirmed to be next to the pylorus, serial firings of the laparoscopic stapler with 60-mm cartridges were utilized  We started in a point inferior to the incisura angularis and the Crows foot nerve looking to preserve the gastric emptying  This was 5 to 6 centimeters proximal to the pylorus  The staple lines were reinforced with buttressing material  We created a pouch based on the lesser curve, and in a semi vertical orientation  We continued the vertical serial firings of the stapler to the angle of His gently cinching the bougie with our laparoscopic stapler looking to create a thin pouch  As we approached the fundus of the stomach and the angle of His, the stapler loads were changed appropriately according to the variable thickness of the tissue  This completely  the pouch from the remnant stomach  We then turned our attention to the newly created pouch and examined it for bleeding or obvious defects on the staple lines and none were found  The distal stomach pouch was occluded with a [place] clamp, and an EGD as well as an air insufflation test was performed  Neither intraoperative bleeding nor leaks were detected  The gastric remnant was externalized through the right sided 15-mm trocar site and passed off the surgical field to be sent to pathology  The sponge, needle and instrument count was reported complete  The 15-mm trocar and periumbilical trocar sites were then closed with use of a suture closure device and a figure-of-eight with absorbable suture  The liver retractor and the remainder ports were then removed under direct laparoscopic visualization and no back bleeding was noted  The skin incisions were all closed with 4-0 absorbable subcuticular suture       The patient tolerated the procedure well, was extubated uneventfully and was transferred to the recovery room in stable condition  I was present for the entire length of the procedure as the attending of record  No qualified resident was available to assist   The presence of an assistant was necessary for camera holding, traction and counter traction and for help with suturing and stapling in addition to performing the intraop-EGD       I was present for the entire procedure and I was present for all critical portions of the procedure    Patient Disposition:  PACU  and extubated and stable    SIGNATURE: Valentin Majano MD  DATE: August 4, 2020  TIME: 8:50 AM

## 2020-08-05 VITALS
DIASTOLIC BLOOD PRESSURE: 100 MMHG | TEMPERATURE: 98.4 F | HEART RATE: 93 BPM | WEIGHT: 221 LBS | SYSTOLIC BLOOD PRESSURE: 162 MMHG | OXYGEN SATURATION: 100 % | RESPIRATION RATE: 17 BRPM | BODY MASS INDEX: 40.42 KG/M2

## 2020-08-05 LAB
ANION GAP SERPL CALCULATED.3IONS-SCNC: 8 MMOL/L (ref 4–13)
BUN SERPL-MCNC: 5 MG/DL (ref 5–25)
CALCIUM SERPL-MCNC: 8.4 MG/DL (ref 8.3–10.1)
CHLORIDE SERPL-SCNC: 102 MMOL/L (ref 100–108)
CO2 SERPL-SCNC: 24 MMOL/L (ref 21–32)
CREAT SERPL-MCNC: 0.71 MG/DL (ref 0.6–1.3)
ERYTHROCYTE [DISTWIDTH] IN BLOOD BY AUTOMATED COUNT: 12.5 % (ref 11.6–15.1)
GFR SERPL CREATININE-BSD FRML MDRD: 105 ML/MIN/1.73SQ M
GLUCOSE SERPL-MCNC: 109 MG/DL (ref 65–140)
HCT VFR BLD AUTO: 37.3 % (ref 34.8–46.1)
HGB BLD-MCNC: 12.8 G/DL (ref 11.5–15.4)
MCH RBC QN AUTO: 31.8 PG (ref 26.8–34.3)
MCHC RBC AUTO-ENTMCNC: 34.3 G/DL (ref 31.4–37.4)
MCV RBC AUTO: 93 FL (ref 82–98)
MRSA NOSE QL CULT: NORMAL
PLATELET # BLD AUTO: 240 THOUSANDS/UL (ref 149–390)
PMV BLD AUTO: 10.1 FL (ref 8.9–12.7)
POTASSIUM SERPL-SCNC: 3.5 MMOL/L (ref 3.5–5.3)
RBC # BLD AUTO: 4.02 MILLION/UL (ref 3.81–5.12)
SODIUM SERPL-SCNC: 134 MMOL/L (ref 136–145)
WBC # BLD AUTO: 13.95 THOUSAND/UL (ref 4.31–10.16)

## 2020-08-05 PROCEDURE — 85027 COMPLETE CBC AUTOMATED: CPT | Performed by: PHYSICIAN ASSISTANT

## 2020-08-05 PROCEDURE — 94760 N-INVAS EAR/PLS OXIMETRY 1: CPT

## 2020-08-05 PROCEDURE — 99024 POSTOP FOLLOW-UP VISIT: CPT | Performed by: SURGERY

## 2020-08-05 PROCEDURE — 80048 BASIC METABOLIC PNL TOTAL CA: CPT | Performed by: PHYSICIAN ASSISTANT

## 2020-08-05 PROCEDURE — NC001 PR NO CHARGE: Performed by: SURGERY

## 2020-08-05 RX ORDER — ACETAMINOPHEN 325 MG/1
975 TABLET ORAL EVERY 8 HOURS SCHEDULED
Qty: 27 TABLET | Refills: 0
Start: 2020-08-05 | End: 2020-08-08

## 2020-08-05 RX ORDER — HYDRALAZINE HYDROCHLORIDE 20 MG/ML
5 INJECTION INTRAMUSCULAR; INTRAVENOUS ONCE
Status: COMPLETED | OUTPATIENT
Start: 2020-08-05 | End: 2020-08-05

## 2020-08-05 RX ADMIN — FLUTICASONE FUROATE AND VILANTEROL TRIFENATATE 1 PUFF: 200; 25 POWDER RESPIRATORY (INHALATION) at 09:19

## 2020-08-05 RX ADMIN — SIMETHICONE CHEW TAB 80 MG 80 MG: 80 TABLET ORAL at 04:13

## 2020-08-05 RX ADMIN — HYDRALAZINE HYDROCHLORIDE 5 MG: 20 INJECTION INTRAMUSCULAR; INTRAVENOUS at 09:19

## 2020-08-05 RX ADMIN — KETOROLAC TROMETHAMINE 15 MG: 30 INJECTION, SOLUTION INTRAMUSCULAR at 09:19

## 2020-08-05 RX ADMIN — ONDANSETRON 4 MG: 2 INJECTION INTRAMUSCULAR; INTRAVENOUS at 01:01

## 2020-08-05 RX ADMIN — METOCLOPRAMIDE 10 MG: 5 INJECTION, SOLUTION INTRAMUSCULAR; INTRAVENOUS at 03:32

## 2020-08-05 RX ADMIN — FAMOTIDINE 20 MG: 10 INJECTION INTRAVENOUS at 09:19

## 2020-08-05 RX ADMIN — ACETAMINOPHEN 975 MG: 325 TABLET, FILM COATED ORAL at 05:52

## 2020-08-05 RX ADMIN — ENOXAPARIN SODIUM 40 MG: 40 INJECTION SUBCUTANEOUS at 09:19

## 2020-08-05 RX ADMIN — KETOROLAC TROMETHAMINE 15 MG: 30 INJECTION, SOLUTION INTRAMUSCULAR at 03:04

## 2020-08-05 NOTE — PROGRESS NOTES
Progress Note - Bariatric Surgery   Akshat Contreras 45 y o  female MRN: [id number]  Unit/Bed#: [address] [place] [address] Encounter: [id number]      Subjective/Objective     Subjective: Patient with morbid obesity s/p lap sleeve gastrectomy POD1  She experienced PONV yesterday, last episode per patient report last night  But nursing note states episode of dry heaving at 0326  Now tolerating liquid diet without nausea or vomiting, pain adequately controlled on oral pain medication, ambulating without assistance, voiding well, using incentive spirometer  Denies fevers, chills, sweats, SOB, CP, calf pain  Objective:    /98 (BP Location: Right arm)   Pulse 98   Temp 98 3 °F (36 8 °C)   Resp 16   Wt 100 kg (221 lb)   SpO2 100%   BMI 40 42 kg/m²       Intake/Output Summary (Last 24 hours) at 8/5/2020 0635  Last data filed at 8/4/2020 2355  Gross per 24 hour   Intake 3560 ml   Output 600 ml   Net 2960 ml       Invasive Devices     Peripheral Intravenous Line            Peripheral IV 08/04/20 Right Hand 1 day    Peripheral IV 08/04/20 Left Arm less than 1 day                ROS: 10-point system completed  All negative except see HPI  Physical Exam    General Appearance:    Alert, cooperative, no distress, appears stated age   Head:    Normocephalic, without obvious abnormality, atraumatic   Lungs:    respirations unlabored   Heart:    Regular rate and rhythm   Abdomen:     Soft, appropriate tenderness, bowel sounds active all four quadrants, non distended, RLQ dressing removed - all incisions clean, dry, and intact  Extremities:   Extremities normal, atraumatic, no cyanosis or edema                   Lab, Imaging and other studies:  I have personally reviewed pertinent lab results    , CBC:   Lab Results   Component Value Date    WBC 13 95 (H) 08/05/2020    HGB 12 8 08/05/2020    HCT 37 3 08/05/2020    MCV 93 08/05/2020     08/05/2020    MCH 31 8 08/05/2020    MCHC 34 3 08/05/2020    RDW 12 5 08/05/2020    MPV 10 1 08/05/2020   , CMP:   Lab Results   Component Value Date    SODIUM 134 (L) 08/05/2020    K 3 5 08/05/2020     08/05/2020    CO2 24 08/05/2020    BUN 5 08/05/2020    CREATININE 0 71 08/05/2020    CALCIUM 8 4 08/05/2020    EGFR 105 08/05/2020        VTE Mechanical Prophylaxis: sequential compression device, Lovenox    Assessment/Plan  1)  Morbid Obesity s/p lap sleeve gastrectomy POD1 with resolved PONV, now doing very well  Encourage PO fluids, ambulation, and incentive spirometry  If patient continues to tolerate adequate PO fluids will plan for D/C this afternoon  2) JANETTE - continue CPAP    3) Anxiety - continue home meds    Plan of care was discussed with patient and patient's nurse  Care plan discussed with Dr Herbert Vang  Dispo: Continue bariatric clear liquid diet, ambulation, incentive spirometry         Rakan De Souza PA-C  8/5/2020  6:37 AM

## 2020-08-05 NOTE — PLAN OF CARE
Problem: RESPIRATORY - ADULT  Goal: Achieves optimal ventilation and oxygenation  Description: INTERVENTIONS:  - Assess for changes in respiratory status  - Assess for changes in mentation and behavior  - Position to facilitate oxygenation and minimize respiratory effort  - Oxygen administered by appropriate delivery if ordered  - Initiate smoking cessation education as indicated  - Encourage broncho-pulmonary hygiene including cough, deep breathe, Incentive Spirometry  - Assess the need for suctioning and aspirate as needed  - Assess and instruct to report SOB or any respiratory difficulty  - Respiratory Therapy support as indicated  Outcome: Progressing     Problem: CARDIOVASCULAR - ADULT  Goal: Absence of cardiac dysrhythmias or at baseline rhythm  Description: INTERVENTIONS:  - Continuous cardiac monitoring, vital signs, obtain 12 lead EKG if ordered  - Administer antiarrhythmic and heart rate control medications as ordered  - Monitor electrolytes and administer replacement therapy as ordered  Outcome: Progressing     Problem: GASTROINTESTINAL - ADULT  Goal: Minimal or absence of nausea and/or vomiting  Description: INTERVENTIONS:  - Administer IV fluids if ordered to ensure adequate hydration  - Maintain NPO status until nausea and vomiting are resolved  - Nasogastric tube if ordered  - Administer ordered antiemetic medications as needed  - Provide nonpharmacologic comfort measures as appropriate  - Advance diet as tolerated, if ordered  - Consider nutrition services referral to assist patient with adequate nutrition and appropriate food choices  Outcome: Progressing  Goal: Maintains or returns to baseline bowel function  Description: INTERVENTIONS:  - Assess bowel function  - Encourage oral fluids to ensure adequate hydration  - Administer IV fluids if ordered to ensure adequate hydration  - Administer ordered medications as needed  - Encourage mobilization and activity  - Consider nutritional services referral to assist patient with adequate nutrition and appropriate food choices  Outcome: Progressing

## 2020-08-05 NOTE — NURSING NOTE
Patient verbalized understanding of discharge instructions  Patient's IV's removed  Patient being discharged in stable condition

## 2020-08-05 NOTE — DISCHARGE INSTRUCTIONS
Bariatric/Weight Loss Surgery  Hospital Discharge Instructions  1  ACTIVITY:  a  Progress as feels comfortable - a good rule is:  if you are doing something and it begins to hurt, stop doing the activity  Walk every hour while at home  b  Codie Stark may walk stairs if you do so slowly  c  You may shower 48 hours after surgery  d  Use your incentive spirometer 10 times per hour while awake for 1 week  e  Do NOT drive for 48 hours after surgery  No driving 24 hours after taking certain prescription pain medications   Examples of such medication are Percocet, Darvocet, Oxycodone, Tylenol #3, and Tylenol with Codeine  Follow your pharmacists orders  2  DIET  a  Stay on a liquid diet for 7 days after your surgery date, sipping slowly  Refer to your manual for examples of choices  Remember to keep your liquids sugar free or low calorie  You may have protein drinks  Make sure to drink 48 to 64 ounces per day of fluids  b  Codie Castroerika may advance to a pureed diet one week after surgery as instructed by your diet progression pamphlet  Once you get approval from your surgeon at your first post operative visit you may advance to the soft diet  3  MEDICATIONS:  a  The abdominal nerve block will wear off during the first 1-2 days that you are home, and you may become sore  Continue to take your Tylenol and your pain medication as instructed  b  Start vitamins and minerals when you get home  c  Anti-acid Medication as per prescription  d  Other medications as indicated on the Physician Patient Discharge Instructions form given to you at the time of discharge  e  Make sure that you are splitting your pill or tablet medications in halves or fourths or even crushing them before you take them  Capsules should be opened and mixed with water or jello  You need to do this for at least 4 weeks after surgery  Eventually you will be able to take your medications the regular way as they were prescribed     f  Codie Stark will need to consult with your Family Doctor in regards to all your prescribed medication, particularly those for blood pressure and diabetes  As you lose weight, medical conditions may change, requiring an alteration or elimination of the drug dose  g  DO NOT TAKE BIRTH CONTROL(BC) MEDICATIONS, INSERT BC VAGINAL RINGS, OR PLACE IUD OR ANY OTHER BC METHODS UNTIL 31 DAYS FROM DAY OF DISCHARGE FROM HOSPITAL  THIS PLACES YOU AT HIGH RISK FOR A POTENTIALLY LIFE THREATENING BLOOD CLOT  Remember to always use barrier methods for birth control and speak to your GYN about using two forms of birth control to start 31 days after surgery  It is very important to avoid pregnancy until at least 18-24 months after surgery  4  INCISION CARE  a  You may shower and get incisions wet 2 days after surgery  No soaking tub baths or swimming for 30 days after surgery  Keep abdominal area and incisions clean  Use soap and water to create a good lather and rinse off  Do not scrub incisions  b  If you have a drain, empty the drain as the nurses instructed  5  FOLLOW-UP APPOINTMENT should be made for one week after discharge  Call surgeons office at [phone] to schedule an appointment  6  CALL YOUR DOCTOR FOR:  pain not controlled by pain medications, a temperature greater than 101 5° F, any increase or change in drainage or redness from any incision, any vomiting or inability to keep liquids down, shortness of breath, shoulder pain, or bleeding        Letter and Information for Patient's Primary Health Care Provider      Dear Edison Harrell,       Your patient had bariatric surgery on this admission to [address] [place] [address]  Due to the restrictive and/or malabsorptive nature of their procedure our surgeons recommend routine lab studies  Your patients surgeon will provide orders initially and ask that they continue to follow-up with us for life even if they see you     As time moves on some patients prefer to follow-up only with their family doctor  We ask that you discuss this regular work-up with them when they make an appointment to see you  *The lab studies recommended to best identify deficiencies are: CBC, CMP, Lipid Profile, Fe, TIBC, %Sat, Vitamin D, Folate, B12, Whole Blood Thiamine, Vitamin A, PTH, Zinc and Ferritin  We recommend HgbA1C for diabetics  *These studies should be done minimally at 6 months and a year post-operatively and then yearly thereafter  *Recommended Daily Supplements: Please see the attached Vitamin Sheet    If your patient has any dietary or psycho-social concerns, they can follow-up with our Team Dietitian and Licensed Clinical   They can reach these team members by calling the [address] [place] [address] [facility]  We also encourage them to follow up at our regularly scheduled support groups or join our [employer] at [phone] Patient Forum  For your convenience we have also included a list of medicines that should be avoided after weight loss surgery and a list of the vitamin and minerals they should be taking for the rest of their lives  The patients are provided this information as well  The [facility] Team would like to thank you for the opportunity to assist in the care of your patient  Feel free to contact us with any questions by calling the [address] [place] [address] Weight Management office at [phone]    Sincerely,         [address] [place] [address] [facility] Team    Additional Information for Providers and Patients                      Vitamins After [place] Gastric Bypass or Sleeve Gastrectomy Surgery    Due to the decreased absorption of nutrients and the decreased amount of food eaten it is difficult to obtain all the nutrients needed consuming food  We recommend a bariatric formulated vitamin for the rest of your life    If you wish to use an over the counter vitamins please understand you may not get all the recommended daily requirements  Use the following guidelines for over the counter vitamins  Multivitamins    We recommend 2 chewable multivitamins with iron (do not take gummy chewable as they do not contain thiamine)     You can continue with the chewable or take any well formulated, high potency multivitamin containing 22 nutrients including zinc and copper   If you decide to take a bariatric vitamin the number of vitamins that you need to take will vary  Refer to the chart provided at team meeting    Calcium - Calcium is absorbed in the part of the small bowel that is bypassed in gastric bypass patients  In addition, as you lose weight, you are more at risk for loss of bone density leading to osteoporosis   The best form of calcium is Calcium Citrate  This form of calcium is better absorbed after your surgery    Recommended daily dose is 1500 mg  Take 500 mg in (3) divided doses  You can only absorb about 500 mg at a time   We recommend 2000 IU of vitamin D3 per day, in addition to what is in your calcium supplement  If you were instructed to take a higher dose based on a deficiency, then continue to take the higher vitamin D dose  Iron - Iron is absorbed in the part of the small bowel that is bypassed   You will need to take extra iron in addition to what is already in the multivitamin if you are a menstruating woman (25-45 mg of additional elemental iron) or have been diagnosed with an iron deficiency   We recommend iron in the form of Ferrous Fumarate with Vitamin C    Follow the instructions on the package or bottle unless your physician has given other dosage amounts  B12 (Cyanocobalamin) may also be decreased   Additional Vitamin B12 is recommended if you are not taking a bariatric vitamin   Take 350 to 500 micrograms (mcg) per day of B12 (Cyanocobalamin) in a sublingual form (for under the tongue)      Note:  Calcium interferes with the absorption of iron, so it is recommended that you take the calcium at least 2 hours apart from iron  The tannins in tea also interfere with the absorption of iron   Note: Anti-ulcer medications interfere with the absorption of calcium iron and B12  Space your anti-ulcer medication 2 hours apart from your vitamins  * Based on the recommendations of the ASMBS and the National Osteoporosis foundation    Non-steroidal anti-inflammatory drugs or medications containing them  You should take caution or avoid these medications as they could harm your pouch or sleeve  **This is a sample list and is not all inclusive  Please read labels carefully  **      Non Steroidal anti-inflammatory drugs  Advil (ibuprofen)  Aleve (naproxen)  Anaprox (naproxen)  Ansaid (flurbiprofen)  Azolid (phenylbutazone)  Bextra (valdecoxib)  Butazolidin (phenylbutazone)  Celebrex (celecoxib)  Clinoril (sulindac)  Dolobid (diflunisal)  Excedrin IB (ibuprofen)  Feldene (piroxicam)  Ibuprin (ibuprofen)  Indocin (indomethacin)  Lodine (etodolac)  Meclomen (meclofenamate)  Midol IB (ibuprofen)  Motrin IB (ibuprofen)  Nalfon (fenoprofen)  Naprosyn (naproxen)  Nuprin (ibuprofen)  Orudis (ketoprofen)  Oruvail (ketoprofen)  Pamprin - IB (ibuprofen)  Ponstel (mefenamic acid)  Rexolate (sodium thiosalicylate)  Tandearil (oxyphenbutazone)  Tolectin (tolmetin)  Voltaren (diclofenac)      Barbiturate  Fiorinal (butalbital/aspirin/caffeine)    Salicylates  Amigesic (salsalate)  Anacin (aspirin)  Arthropan (choline salicylate)  Ascriptin (buffered aspirin)  Aspirin (aspirin)  Aspirtab (aspirin)  Bufferin (buffered aspirin)  Disalcid (salsalate)  Ecotrin (aspirin)  Uracel (sodium salicylate)    Analgesics  Equagesic (meprobamate/aspirin)  Micrainin (meprobamate/aspirin)  Percodan (oxycodone/aspirin)    OTC  Pepto-Bismol®  [place]®  Excedrin®    For Gastric Bypass Patients  Extended Release Medications  Sustained Release Medications  Time Released Medications

## 2020-08-05 NOTE — PLAN OF CARE
Problem: RESPIRATORY - ADULT  Goal: Achieves optimal ventilation and oxygenation  Description: INTERVENTIONS:  - Assess for changes in respiratory status  - Assess for changes in mentation and behavior  - Position to facilitate oxygenation and minimize respiratory effort  - Oxygen administered by appropriate delivery if ordered  - Initiate smoking cessation education as indicated  - Encourage broncho-pulmonary hygiene including cough, deep breathe, Incentive Spirometry  - Assess the need for suctioning and aspirate as needed  - Assess and instruct to report SOB or any respiratory difficulty  - Respiratory Therapy support as indicated  8/5/2020 1000 by Jaclyn Harris RN  Outcome: Completed  8/5/2020 0954 by Jaclyn Harris RN  Outcome: Progressing     Problem: CARDIOVASCULAR - ADULT  Goal: Absence of cardiac dysrhythmias or at baseline rhythm  Description: INTERVENTIONS:  - Continuous cardiac monitoring, vital signs, obtain 12 lead EKG if ordered  - Administer antiarrhythmic and heart rate control medications as ordered  - Monitor electrolytes and administer replacement therapy as ordered  8/5/2020 1000 by Jaclyn Harris RN  Outcome: Completed  8/5/2020 0954 by Jaclyn Harris RN  Outcome: Progressing     Problem: CARDIOVASCULAR - ADULT  Goal: Absence of cardiac dysrhythmias or at baseline rhythm  Description: INTERVENTIONS:  - Continuous cardiac monitoring, vital signs, obtain 12 lead EKG if ordered  - Administer antiarrhythmic and heart rate control medications as ordered  - Monitor electrolytes and administer replacement therapy as ordered  8/5/2020 1000 by Jaclyn Harris RN  Outcome: Completed  8/5/2020 0954 by Jaclyn Harris RN  Outcome: Progressing     Problem: GASTROINTESTINAL - ADULT  Goal: Minimal or absence of nausea and/or vomiting  Description: INTERVENTIONS:  - Administer IV fluids if ordered to ensure adequate hydration  - Maintain NPO status until nausea and vomiting are resolved  - Nasogastric tube if ordered  - Administer ordered antiemetic medications as needed  - Provide nonpharmacologic comfort measures as appropriate  - Advance diet as tolerated, if ordered  - Consider nutrition services referral to assist patient with adequate nutrition and appropriate food choices  8/5/2020 1000 by Dayron Woodward RN  Outcome: Completed  8/5/2020 [address] [place] [address] by Dayron Woodward RN  Outcome: Progressing  Goal: Maintains or returns to baseline bowel function  Description: INTERVENTIONS:  - Assess bowel function  - Encourage oral fluids to ensure adequate hydration  - Administer IV fluids if ordered to ensure adequate hydration  - Administer ordered medications as needed  - Encourage mobilization and activity  - Consider nutritional services referral to assist patient with adequate nutrition and appropriate food choices  8/5/2020 1000 by Dayron Woodward RN  Outcome: Completed  8/5/2020 [address] [place] [address] by Dayron Woodward RN  Outcome: Progressing

## 2020-08-05 NOTE — UTILIZATION REVIEW
Initial Clinical Review    Elective  surgical procedure  Age/Sex: 45 y o  female  Surgery Date: 8/4/20  Procedure: LAPAROSCOPIC SLEEVE  GASTRECTOMY (N/A)     Anesthesia: General  Operative Findings: Dense adhesions in LUQ     POD#1 Progress Note:   Patient with morbid obesity s/p lap sleeve gastrectomy POD1  Abdomen soft, appropriate tenderness, bowel sounds active 4 Quadrants, non distended, RLQ dsg removed, all incisions c/d/i  She experienced PONV yesterday, last episode per patient report last night  But nursing note states episode of dry heaving at 0326  Now tolerating liquid diet without nausea or vomiting, pain adequately controlled on oral pain medication, ambulating without assistance, voiding well, using incentive spirometer  Denies fevers, chills, sweats, SOB, CP, calf pain  Continue clears, oob in halls ,atc analgesia prn antiemetics prn  Restart home medications  lovenox   Continue pulmonary toilet   D/c home today  Admission Orders: Date/Time/Statement:   Admission Orders (From admission, onward)     Ordered        08/04/20 0915  Inpatient Admission  Once                   Orders Placed This Encounter   Procedures    Inpatient Admission     Standing Status:   Standing     Number of Occurrences:   1     Order Specific Question:   Admitting Physician     Answer:   Jose Robertson     Order Specific Question:   Level of Care     Answer:   Med Surg [16]     Order Specific Question:   Bed Type     Answer:   Bariatric [1]     Order Specific Question:   Estimated length of stay     Answer:   Inpatient Only Surgery     Vital Signs: /100 (BP Location: Left arm)   Pulse 93   Temp 98 4 °F (36 9 °C) (Oral)   Resp 17   Wt 100 kg (221 lb)   SpO2 100%   BMI 40 42 kg/m²   Diet: Bariatric clears  Mobility: oob  DVT Prophylaxis: lovenox  Medications/Pain Control: Dilaudid  Scheduled Medications:  acetaminophen, 975 mg, Oral, Q6H RUSS  enoxaparin, 40 mg, Subcutaneous, Daily  famotidine, 20 mg, Intravenous, BID  fluticasone-vilanterol, 1 puff, Inhalation, Daily  ketorolac, 15 mg, Intravenous, Q6H [facility] & long term  scopolamine, 1 patch, Transdermal, Once  simethicone, 80 mg, Oral, Q12H [facility] & long term      Continuous IV Infusions:  lactated ringers, 75 mL/hr, Intravenous, Continuous      PRN Meds:  clonazePAM, 0 5 mg, Oral, BID PRN  metoclopramide, 10 mg, Intravenous, Q6H PRN x2  ondansetron, 4 mg, Intravenous, Q6H PRN x3  oxyCODONE, 10 mg, Oral, Q4H PRN  oxyCODONE, 5 mg, Oral, Q4H PRN  phenol, 2 spray, Mouth/Throat, Q2H PRN  promethazine, 25 mg, Intramuscular, Q6H PRN          Network Utilization Review Department  [email or web address]  ATTENTION: Please call with any questions or concerns to [phone] and carefully listen to the prompts so that you are directed to the right person  All voicemails are confidential   Juju Muller all requests for admission clinical reviews, approved or denied determinations and any other requests to dedicated fax number below belonging to the campus where the patient is receiving treatment   List of dedicated fax numbers for the Facilities:  [address] DENIALS (Administrative/Medical Necessity) [phone]   [address] (Maternity/NICU/Pediatrics) [phone]   [facility] [phone]   [facility] [phone]   Danita Vora [phone]   Terpedro Satchel [phone]   [address] [phone]   [facility]  [phone]   2205 [facility]  2401 [facility] 1000 [address] [facility] [phone]

## 2020-08-06 ENCOUNTER — TELEPHONE (OUTPATIENT)
Dept: BARIATRICS | Facility: CLINIC | Age: 45
End: 2020-08-06

## 2020-08-06 DIAGNOSIS — R11.0 NAUSEA: Primary | ICD-10-CM

## 2020-08-06 RX ORDER — ONDANSETRON 4 MG/1
4 TABLET, ORALLY DISINTEGRATING ORAL EVERY 6 HOURS PRN
Qty: 20 TABLET | Refills: 0 | Status: SHIPPED | OUTPATIENT
Start: 2020-08-06 | End: 2020-11-18 | Stop reason: ALTCHOICE

## 2020-08-06 NOTE — TELEPHONE ENCOUNTER
Zofran Rx sent to the patient  Discussed with JAMAR Wills and Dr Ramos Breath  Patient will continue to push hydrating fluids and call if nausea worsens or does not improve

## 2020-08-06 NOTE — TELEPHONE ENCOUNTER
Post op follow up phone call completed  Pt is sipping liquids, using IS as instructed, reinforced importance of using IS to help prevent pneumonia  Ambulating about home without difficulty  Co/o nausea with sips  Will inquire about a script for Zofran to be sent in  Pain controlled with analgesia  Reaffirmed examples of liquid diet over the next week  Pt stated understanding about discharge instructions and medication adjustments  Tolerating self administration of Lovenox injections without difficulty  Follow up appt with surgeon scheduled for next week  Instructed to call with any additional questions or concerns

## 2020-08-10 NOTE — UTILIZATION REVIEW
Notification of Discharge  This is a Notification of Discharge from our facility [address]  Please be advised that this patient has been discharge from our facility  Below you will find the admission and discharge date and time including the patients disposition  PRESENTATION DATE: 8/4/2020  4:59 AM  OBS ADMISSION DATE:   IP ADMISSION DATE: 8/4/20 0915   DISCHARGE DATE: 8/5/2020 10:58 AM  DISPOSITION: Home/Self Care Home/Self Care   Admission Orders listed below:  Admission Orders (From admission, onward)     Ordered        08/04/20 0915  Inpatient Admission  Once                   Please contact the UR Department if additional information is required to close this patient's authorization/case  [facility] Utilization Review Department  Main: [phone] x carefully listen to the prompts  All voicemails are confidential   [email or web address]  Send all requests for admission clinical reviews, approved or denied determinations and any other requests to dedicated fax number below belonging to the campus where the patient is receiving treatment   List of dedicated fax numbers:  [address] DENIALS (Administrative/Medical Necessity) [phone]   [address] [place] [address] (Maternity/NICU/Pediatrics) [phone]   [facility] [phone]   Franco Juan [phone]   Lily Heady [phone]   Hettie Lips [facility] 1525 [facility] [phone]   [facility]  [phone]   2205 [facility]  2401 [facility] 1000 [address] [facility] [phone]

## 2020-08-13 ENCOUNTER — OFFICE VISIT (OUTPATIENT)
Dept: BARIATRICS | Facility: CLINIC | Age: 45
End: 2020-08-13

## 2020-08-13 VITALS
SYSTOLIC BLOOD PRESSURE: 100 MMHG | HEART RATE: 101 BPM | RESPIRATION RATE: 14 BRPM | WEIGHT: 207.6 LBS | DIASTOLIC BLOOD PRESSURE: 70 MMHG | BODY MASS INDEX: 38.2 KG/M2 | HEIGHT: 62 IN | TEMPERATURE: 98.4 F

## 2020-08-13 VITALS — HEIGHT: 62 IN | WEIGHT: 207.6 LBS | BODY MASS INDEX: 38.2 KG/M2 | TEMPERATURE: 98.4 F

## 2020-08-13 DIAGNOSIS — K91.2 POSTSURGICAL MALABSORPTION: Primary | ICD-10-CM

## 2020-08-13 DIAGNOSIS — Z98.84 S/P LAPAROSCOPIC SLEEVE GASTRECTOMY: Primary | ICD-10-CM

## 2020-08-13 PROCEDURE — 99024 POSTOP FOLLOW-UP VISIT: CPT | Performed by: SURGERY

## 2020-08-13 PROCEDURE — RECHECK

## 2020-08-13 RX ORDER — CALCIUM CARB/VITAMIN D3/VIT K1 650MG-12.5
1 TABLET,CHEWABLE ORAL 3 TIMES DAILY
COMMUNITY

## 2020-08-13 RX ORDER — MULTIVITAMIN
1 TABLET ORAL DAILY
COMMUNITY

## 2020-08-13 NOTE — PROGRESS NOTES
FIRST POST-OPERATIVE VISIT - BARIATRIC SURGERY  Gary Gottron 45 y o  female MRN: [id number]  Unit/Bed#:  Encounter: [id number]      HPI:  Gary Gottron is a 45 y o  female who presents for follow up s/p laparoscopic sleeve gastrectomy  Complains of RLQ incisional pain  It is improving from postop but still is concerned by the pain  I explained that the soreness will continue to improve and to take 1000mg tylenol Q8hr PRN for a short period to help alleviate the soreness  She is drinking 48oz per day plus protein shakes  She will increase her hydration  Has noted some loose stools  I explained that this may be related to her shakes as she is also lactose intolerant  Denies n/v/cp/sob    Review of Systems   Constitutional: Negative  Respiratory: Negative  Cardiovascular: Negative  Gastrointestinal: Negative  Abdominal muscle soreness at incisions RLQ     Musculoskeletal: Negative  Neurological: Negative  All other systems reviewed and are negative        Historical Information   Past Medical History:   Diagnosis Date    Acid reflux     Asthma     Baker cyst, left     Constipation     Cough     CPAP (continuous positive airway pressure) dependence     Digestive disorder     Headache     History of mammogram 07/17/2018    Increased urinary frequency     Joint pain     Morbid obesity with BMI of 40 0-44 9, adult (HCC)     Postgastrectomy malabsorption     Seasonal allergies     Shortness of breath     Sleep apnea      Past Surgical History:   Procedure Laterality Date    CARPAL TUNNEL RELEASE      EGD      ENDOMETRIAL ABLATION W/ Radha Hughes  2014    Laparscopy, D&C, Novasure ablation    FOOT SURGERY      HYSTERECTOMY  12/20/2016    B/L salpingectomy    PARTIAL HYSTERECTOMY      ME LAP, POLINA RESTRICT PROC, LONGITUDINAL GASTRECTOMY N/A 8/4/2020    Procedure: LAPAROSCOPIC SLEEVE  GASTRECTOMY;  Surgeon: Krissy Lopez MD;  Location: [address] [place] [address];  Service: Bariatrics    TENDON REPAIR  2016    TUBAL LIGATION  08/06/2010    Laparoscopy tubal cautery     Social History   Social History     Substance and Sexual Activity   Alcohol Use Yes    Comment: 0cc     Social History     Substance and Sexual Activity   Drug Use No     Social History     Tobacco Use   Smoking Status Never Smoker   Smokeless Tobacco Never Used     Family History: non-contributory    Meds/Allergies   all medications and allergies reviewed  Allergies   Allergen Reactions    Meloxicam Nausea Only    Seasonal Ic [Cholestatin] Cough     Seasonal Allergies         Objective     Current Vitals:   /70 (BP Location: Right arm, Patient Position: Sitting, Cuff Size: Large)   Pulse 101   Temp 98 4 °F (36 9 °C) (Tympanic)   Resp 14   Ht 5' 2" (1 575 m)   Wt 94 2 kg (207 lb 9 6 oz)   BMI 37 97 kg/m²       Physical Exam  Constitutional:       Appearance: She is well-developed  HENT:      Head: Normocephalic  Neck:      Musculoskeletal: Normal range of motion  Cardiovascular:      Rate and Rhythm: Normal rate  Pulmonary:      Effort: Pulmonary effort is normal    Abdominal:      General: There is no distension  Palpations: Abdomen is soft  Tenderness: There is abdominal tenderness  There is no rebound  Comments: Incisions c/d/i  Tenderness at RLQ incision site but not signs of hematoma/seroma/infection  No signs of infection  No r/g   Musculoskeletal: Normal range of motion  Skin:     General: Skin is warm and dry  Neurological:      Mental Status: She is alert and oriented to person, place, and time  Psychiatric:         Behavior: Behavior normal          Thought Content:  Thought content normal          Judgment: Judgment normal          Assessment/Plan:    Patient is presenting for the first postoperative visit, patient hospital stay was uneventful without any complications, patient is doing well, has no complaints, is taking vitamins as instructed, currently tolerating the blenderized diet, will advance to soft diet  The patient is also tolerating Lovenox injections and will be completing the remaining doses  Patient will also be meeting with our dietician today to review her vitamin and mineral supplements and also go over her diet and emphasize postoperative commitment and compliance  The patient was also instructed to start exercising on a regular basis  However, I recommended no heavy lifting, or weight exercises for another 2 weeks  F/U in 4 weeks  Patient was instructed to call if develops nausea, vomiting, fever or chills   Pathology was also reviewed and was normal

## 2020-08-13 NOTE — PROGRESS NOTES
Weight Management Nutrition Class     Diagnosis: Obesity    Bariatric Surgeon: Dr Love Favors     Surgery: Vertical Sleeve Gastrectomy    Class: first post op note    Topics discussed today include:     fluid goals post op, protein goals post op, constipation, chew food well, exercise, avoidance of alcohol, PPI use, diet progression, hypoglycemia, dumping syndrome, protein supplems, vitamin/mineral supplements and calcium supplements    Patient was able to verbalize basic diet (protein, fluid, vitamin and mineral) recommendations and possible nutrition-related complications  Yes     Yesterday:  Measuring 1/4 cup--most times doesn't even finish that  Oatmeal/cream of wheat made with water and a splash of milk  Jell-o  Pureed egg salad   Pureed chicken and broccoli  Most often 1 protein shakes per day  32-48oz water or propel or crystal light per day    Vitamins:  Procare chewable w/45mg iron one a day  vicactive 1 TID    Not meeting protein needs with just one protein shake a day  Suggested to try to incorporate a protein water to get both hydration and protein  Advised pt can start soft diet on 8/18 and remain on the diet for 6-8 weeks

## 2020-08-17 ENCOUNTER — TELEPHONE (OUTPATIENT)
Dept: BARIATRICS | Facility: CLINIC | Age: 45
End: 2020-08-17

## 2020-08-21 NOTE — TELEPHONE ENCOUNTER
Spoke with Randy Chatman regarding return to work paperwork  Faxed over paperwork to [employer] and employer

## 2020-08-27 ENCOUNTER — TELEPHONE (OUTPATIENT)
Dept: BARIATRICS | Facility: CLINIC | Age: 45
End: 2020-08-27

## 2020-08-27 DIAGNOSIS — L76.82 PAIN AT SURGICAL INCISION: ICD-10-CM

## 2020-08-27 DIAGNOSIS — Z98.84 S/P LAPAROSCOPIC SLEEVE GASTRECTOMY: Primary | ICD-10-CM

## 2020-08-27 NOTE — TELEPHONE ENCOUNTER
Received call from patient, she stated yesterday she was lifting boxes and doing a little more around the house  Pt stated last night she was having soreness on her right side by her ribs  Pt stated she did take Tylenol that helped, but wanted to make you aware and if you had any other reccomendations  She feels like she just pulled a muscle, she stated she does feel a little better today than she did last night, but still has soreness if she touches the area

## 2020-08-27 NOTE — LETTER
Patient: Kenda Kayser   YOB: 1975   Date of Visit: 8/13/2020         To Whom it May Concern:     Kenda Kayser is under my professional care  She was seen in my office on 8/13/2020   She can return to work on September 24, 2020      If you have any questions or concerns, please don't hesitate to call          Sincerely,     Florencia Velez PA-C

## 2020-09-02 RX ORDER — IBUPROFEN 600 MG/1
600 TABLET ORAL EVERY 6 HOURS SCHEDULED
Qty: 12 TABLET | Refills: 0 | Status: SHIPPED | OUTPATIENT
Start: 2020-09-02 | End: 2020-11-19 | Stop reason: ALTCHOICE

## 2020-09-02 NOTE — TELEPHONE ENCOUNTER
Patient reports muscle strain last week after lifting boxes  Denies any bulges at incision sites  She has stabbing pain in her RLQ incision with certain movements and stretching  Tylenol does not help   Advised her it is safe and normal incision pain and we will plan on the following:    -Reinforced Tylenol prn  -Ibuprofen 600mg Q6 x 3 days and then avoid all NSAIDS  -Increase PPI to BID while on ibuprofen and then reduce back to QD  -Remember to crush or cut down all pills  -Brace abdomen and side with pillow, may try abdominal binder, and warm/dry pack prn  -May extend time off work for 2 more weeks (discussed with Dr Asha Hope)

## 2020-09-02 NOTE — TELEPHONE ENCOUNTER
Received call from patient, she stated she did speak to you last week regarding issues below  Patient stated she has been taking it easy but yesterday when she bent over to tie her shoe the same pain came back  Patient stated she feels like she is not able to return to work next week, she wanted to know she could get a letter to extend her time for a few more weeks or should she see her PCP?

## 2020-09-30 ENCOUNTER — OFFICE VISIT (OUTPATIENT)
Dept: BARIATRICS | Facility: CLINIC | Age: 45
End: 2020-09-30

## 2020-09-30 VITALS — HEIGHT: 62 IN | WEIGHT: 190.2 LBS | BODY MASS INDEX: 35 KG/M2

## 2020-09-30 DIAGNOSIS — K91.2 POSTSURGICAL MALABSORPTION: Primary | ICD-10-CM

## 2020-09-30 PROCEDURE — RECHECK

## 2020-09-30 NOTE — PROGRESS NOTES
Weight Management Nutrition Class     Diagnosis: Morbid Obesity    Bariatric Surgeon: Dr Tony Yen     Surgery: Vertical Sleeve Gastrectomy    Class: 5 week post op     Topics discussed today include:     fluid goals post op, protein goals post op, constipation, chew food well, exercise, avoidance of alcohol, PPI use, diet progression, hypoglycemia, dumping syndrome, protein supplems, vitamin/mineral supplements and calcium supplements    Patient was able to verbalize basic diet (protein, fluid, vitamin and mineral) recommendations and possible nutrition-related complications  Yes     B: 1 egg with cheese (7-10gm)  S: 1/4 c greek yogurt (7gm)  L: tuna pack (often not the whole pack-10gm)  D:  Makes something and eats it all week-grilled 1oz chicken, few bites broccoli, small red potato w/o skin  Has also tried lobster tail and crab legs  Doing one quest protein shake--pre-made (15gm)    Fluids:  Water or crystal light iced tea (64oz total)  Vitamins:  1 chewable procare + 2-3 viactive    Has already gone down 2 shirt and pant sizes

## 2020-10-14 ENCOUNTER — OFFICE VISIT (OUTPATIENT)
Dept: OTOLARYNGOLOGY | Facility: CLINIC | Age: 45
End: 2020-10-14
Payer: COMMERCIAL

## 2020-10-14 ENCOUNTER — OFFICE VISIT (OUTPATIENT)
Dept: AUDIOLOGY | Facility: CLINIC | Age: 45
End: 2020-10-14
Payer: COMMERCIAL

## 2020-10-14 VITALS — TEMPERATURE: 98.4 F | BODY MASS INDEX: 34.23 KG/M2 | WEIGHT: 186 LBS | HEIGHT: 62 IN

## 2020-10-14 DIAGNOSIS — E55.9 VITAMIN D DEFICIENCY: ICD-10-CM

## 2020-10-14 DIAGNOSIS — E07.89 THYROID FULLNESS: ICD-10-CM

## 2020-10-14 DIAGNOSIS — R42 VERTIGO: Primary | ICD-10-CM

## 2020-10-14 DIAGNOSIS — H90.5 SENSORY HEARING LOSS, UNILATERAL: Primary | ICD-10-CM

## 2020-10-14 DIAGNOSIS — Z98.84 S/P LAPAROSCOPIC SLEEVE GASTRECTOMY: ICD-10-CM

## 2020-10-14 PROCEDURE — 99243 OFF/OP CNSLTJ NEW/EST LOW 30: CPT | Performed by: NURSE PRACTITIONER

## 2020-10-14 PROCEDURE — 92567 TYMPANOMETRY: CPT | Performed by: AUDIOLOGIST

## 2020-10-14 PROCEDURE — 92557 COMPREHENSIVE HEARING TEST: CPT | Performed by: AUDIOLOGIST

## 2020-10-14 RX ORDER — MECLIZINE HYDROCHLORIDE 25 MG/1
25 TABLET ORAL 2 TIMES DAILY PRN
COMMUNITY
Start: 2020-10-05 | End: 2020-11-19

## 2020-10-15 ENCOUNTER — OFFICE VISIT (OUTPATIENT)
Dept: OBGYN CLINIC | Facility: CLINIC | Age: 45
End: 2020-10-15
Payer: COMMERCIAL

## 2020-10-15 VITALS
WEIGHT: 189.4 LBS | DIASTOLIC BLOOD PRESSURE: 76 MMHG | HEIGHT: 62 IN | SYSTOLIC BLOOD PRESSURE: 122 MMHG | BODY MASS INDEX: 34.85 KG/M2

## 2020-10-15 DIAGNOSIS — Z12.31 ENCOUNTER FOR SCREENING MAMMOGRAM FOR BREAST CANCER: ICD-10-CM

## 2020-10-15 DIAGNOSIS — Z01.419 ENCNTR FOR GYN EXAM (GENERAL) (ROUTINE) W/O ABN FINDINGS: Primary | ICD-10-CM

## 2020-10-15 PROCEDURE — 99386 PREV VISIT NEW AGE 40-64: CPT | Performed by: OBSTETRICS & GYNECOLOGY

## 2020-10-21 ENCOUNTER — TELEPHONE (OUTPATIENT)
Dept: OTOLARYNGOLOGY | Facility: CLINIC | Age: 45
End: 2020-10-21

## 2020-11-07 ENCOUNTER — HOSPITAL ENCOUNTER (OUTPATIENT)
Dept: ULTRASOUND IMAGING | Facility: HOSPITAL | Age: 45
Discharge: HOME | End: 2020-11-07
Payer: COMMERCIAL

## 2020-11-07 DIAGNOSIS — E07.89 THYROID FULLNESS: ICD-10-CM

## 2020-11-07 DIAGNOSIS — Z98.84 S/P LAPAROSCOPIC SLEEVE GASTRECTOMY: ICD-10-CM

## 2020-11-07 DIAGNOSIS — R42 VERTIGO: ICD-10-CM

## 2020-11-07 DIAGNOSIS — E55.9 VITAMIN D DEFICIENCY: ICD-10-CM

## 2020-11-07 PROCEDURE — 76536 US EXAM OF HEAD AND NECK: CPT

## 2020-11-11 ENCOUNTER — TELEPHONE (OUTPATIENT)
Dept: OTOLARYNGOLOGY | Facility: CLINIC | Age: 45
End: 2020-11-11

## 2020-11-18 PROBLEM — K91.2 POSTSURGICAL MALABSORPTION: Status: ACTIVE | Noted: 2020-11-18

## 2020-11-18 PROBLEM — Z48.815 ENCOUNTER FOR SURGICAL AFTERCARE FOLLOWING SURGERY OF DIGESTIVE SYSTEM: Status: ACTIVE | Noted: 2020-08-13

## 2020-11-19 ENCOUNTER — TELEMEDICINE (OUTPATIENT)
Dept: BARIATRICS | Facility: CLINIC | Age: 45
End: 2020-11-19
Payer: COMMERCIAL

## 2020-11-19 ENCOUNTER — TELEPHONE (OUTPATIENT)
Dept: BARIATRICS | Facility: CLINIC | Age: 45
End: 2020-11-19

## 2020-11-19 VITALS — HEIGHT: 62 IN | WEIGHT: 168.8 LBS | BODY MASS INDEX: 31.06 KG/M2

## 2020-11-19 DIAGNOSIS — Z48.815 ENCOUNTER FOR SURGICAL AFTERCARE FOLLOWING SURGERY OF DIGESTIVE SYSTEM: Primary | ICD-10-CM

## 2020-11-19 DIAGNOSIS — K91.2 POSTSURGICAL MALABSORPTION: ICD-10-CM

## 2020-11-19 DIAGNOSIS — J45.20 MILD INTERMITTENT ASTHMA WITHOUT COMPLICATION: ICD-10-CM

## 2020-11-19 DIAGNOSIS — R42 VERTIGO: ICD-10-CM

## 2020-11-19 DIAGNOSIS — E66.9 OBESITY (BMI 30.0-34.9): ICD-10-CM

## 2020-11-19 PROBLEM — E66.811 OBESITY (BMI 30.0-34.9): Status: ACTIVE | Noted: 2019-10-23

## 2020-11-19 PROCEDURE — 99214 OFFICE O/P EST MOD 30 MIN: CPT | Performed by: PHYSICIAN ASSISTANT

## 2020-11-23 ENCOUNTER — TELEPHONE (OUTPATIENT)
Dept: OTOLARYNGOLOGY | Facility: CLINIC | Age: 45
End: 2020-11-23

## 2021-01-24 LAB
CALCIUM SERPL-MCNC: 9.2 MG/DL (ref 8.6–10.2)
FERRITIN SERPL-MCNC: 171 NG/ML (ref 16–232)
IRON SATN MFR SERPL: 27 % (CALC) (ref 16–45)
IRON SERPL-MCNC: 71 MCG/DL (ref 40–190)
PTH-INTACT SERPL-MCNC: 30 PG/ML (ref 14–64)
TIBC SERPL-MCNC: 259 MCG/DL (CALC) (ref 250–450)
VIT A SERPL-MCNC: 49 MCG/DL (ref 38–98)
VIT B1 BLD-SCNC: 125 NMOL/L (ref 78–185)
ZINC SERPL-MCNC: 87 MCG/DL (ref 60–130)

## 2021-01-25 ENCOUNTER — TELEPHONE (OUTPATIENT)
Dept: BARIATRICS | Facility: CLINIC | Age: 46
End: 2021-01-25

## 2021-01-25 NOTE — TELEPHONE ENCOUNTER
Left a brief message on patient's voicemail explaining labs are within normal levels and providing my contact information if she wanted details or had questions     ---- Message from Brittany Driver PA-C sent at 1/25/2021  7:31 AM EST -----  Please notify patient that the rest of her bariatric lab panel is WNL, all vitamins WNL

## 2021-02-16 ENCOUNTER — TELEPHONE (OUTPATIENT)
Dept: BARIATRICS | Facility: CLINIC | Age: 46
End: 2021-02-16

## 2021-03-08 RX ORDER — GUAIFENESIN, PSEUDOEPHEDRINE HYDROCHLORIDE 600; 60 MG/1; MG/1
1 TABLET, EXTENDED RELEASE ORAL EVERY 12 HOURS
COMMUNITY
Start: 2020-11-30

## 2021-03-08 RX ORDER — TRAMADOL HYDROCHLORIDE 50 MG/1
50 TABLET ORAL EVERY 6 HOURS PRN
COMMUNITY
Start: 2020-12-30 | End: 2021-12-30

## 2021-03-15 ENCOUNTER — TELEPHONE (OUTPATIENT)
Dept: BARIATRICS | Facility: CLINIC | Age: 46
End: 2021-03-15

## 2021-03-16 ENCOUNTER — OFFICE VISIT (OUTPATIENT)
Dept: BARIATRICS | Facility: CLINIC | Age: 46
End: 2021-03-16
Payer: COMMERCIAL

## 2021-03-16 VITALS
HEIGHT: 62 IN | BODY MASS INDEX: 28.45 KG/M2 | TEMPERATURE: 97.4 F | DIASTOLIC BLOOD PRESSURE: 84 MMHG | HEART RATE: 84 BPM | WEIGHT: 154.6 LBS | SYSTOLIC BLOOD PRESSURE: 122 MMHG

## 2021-03-16 DIAGNOSIS — K91.2 POSTSURGICAL MALABSORPTION: ICD-10-CM

## 2021-03-16 DIAGNOSIS — Z98.84 S/P LAPAROSCOPIC SLEEVE GASTRECTOMY: Primary | ICD-10-CM

## 2021-03-16 PROCEDURE — 99213 OFFICE O/P EST LOW 20 MIN: CPT | Performed by: SURGERY

## 2021-03-16 NOTE — PROGRESS NOTES
Assessment/Plan:       Diagnoses and all orders for this visit:    S/P laparoscopic sleeve gastrectomy  -     CBC and Platelet; Future  -     Comprehensive metabolic panel; Future  -     Copper Level; Future  -     Ferritin; Future  -     Iron Saturation %; Future  -     Lipid panel; Future  -     Methylmalonic acid, serum; Future  -     PTH, intact; Future  -     Retinol Binding Protein; Future  -     Vitamin A; Future  -     Vitamin B1, whole blood; Future  -     Vitamin B12; Future  -     Vitamin D 25 hydroxy; Future  -     Zinc; Future    Postsurgical malabsorption  -     CBC and Platelet; Future  -     Comprehensive metabolic panel; Future  -     Copper Level; Future  -     Ferritin; Future  -     Iron Saturation %; Future  -     Lipid panel; Future  -     Methylmalonic acid, serum; Future  -     PTH, intact; Future  -     Retinol Binding Protein; Future  -     Vitamin A; Future  -     Vitamin B1, whole blood; Future  -     Vitamin B12; Future  -     Vitamin D 25 hydroxy; Future  -     Zinc; Future          Subjective:      Patient ID: Kirsten Javed is a 39 y o  female  -s/p Vertical Sleeve Gastrectomy with Dr Shawn Guillaume on 8/4/2020  Presents to the office today for routine follow up  Tolerating diet without issues; denies N/V, dysphagia, reflux  Overall doing Well  Initial: 225 4  Current: 154 6  EWL: 79%    Current BMI is Body mass index is 28 28 kg/m²  The following portions of the patient's history were reviewed and updated as appropriate: allergies, current medications, past family history, past medical history, past social history, past surgical history and problem list     Review of Systems   Constitutional: Negative  Respiratory: Negative  Cardiovascular: Negative  Gastrointestinal: Negative  Musculoskeletal: Positive for joint swelling  Swelling and pain in R knee   Neurological: Negative  All other systems reviewed and are negative          Objective:      /84 (BP Location: Left arm, Patient Position: Sitting, Cuff Size: Large)   Pulse 84   Temp (!) 97 4 °F (36 3 °C)   Ht 5' 2" (1 575 m)   Wt 70 1 kg (154 lb 9 6 oz)   BMI 28 28 kg/m²          Physical Exam  Constitutional:       Appearance: She is well-developed  HENT:      Head: Normocephalic  Eyes:      Extraocular Movements: Extraocular movements intact  Neck:      Musculoskeletal: Normal range of motion  Cardiovascular:      Rate and Rhythm: Normal rate  Pulmonary:      Effort: Pulmonary effort is normal    Abdominal:      General: There is no distension  Palpations: Abdomen is soft  Tenderness: There is no abdominal tenderness  There is no rebound  Comments: Incisional scars healed well  Excess skin   Musculoskeletal: Normal range of motion  Skin:     General: Skin is warm and dry  Neurological:      Mental Status: She is alert and oriented to person, place, and time  Psychiatric:         Mood and Affect: Mood normal          Behavior: Behavior normal          Thought Content: Thought content normal          Judgment: Judgment normal            BARRIERS: none identified    GOALS:   · Continued/Maintain healthy weight loss with good nutrition intakes  · Adequate hydration with at least 64oz  fluid intake  · Normal vitamin and mineral levels  · Exercise as tolerated  · Follow-up in 6 months  We kindly ask that your arrive 15 minutes before your scheduled appointment time with your provider to allow our staff to room you, get your vital signs and update your chart  · Follow diet as discussed  · Get lab work done in the next 2 weeks  You have been given a lab slip today  Please call the office if you need a replacement  It is recommended to check with your insurance BEFORE getting labs done to make sure they are covered by your policy  Also, please check with your PCP and other providers before getting labs to avoid duplicate labs   Make sure to HOLD any multivitamins that may contain biotin and any biotin supplements FOR 5 DAYS before any labs since it can affect the results  · Follow vitamin and mineral recommendations as reviewed with you  · Call our office if you have any problems with abdominal pain especially associated with fever, chills, nausea, vomiting or any other concerns  · All  Post-bariatric surgery patients should be aware that very small quantities of any alcohol can cause impairment and it is very possible not to feel the effect  The effect can be in the system for several hours  It is also a stomach irritant  · It is advised to AVOID alcohol, Nonsteroidal antiinflammatory drugs (NSAIDS) and nicotine of all forms   Any of these can cause stomach irritation/pain

## 2021-03-19 ENCOUNTER — TELEPHONE (OUTPATIENT)
Dept: BARIATRICS | Facility: CLINIC | Age: 46
End: 2021-03-19

## 2021-03-19 NOTE — TELEPHONE ENCOUNTER
Patient called stating she normally takes bariatric multivitamin chewables  Patient took a bariatric multivitamin capsule which caused her to have itching, redness and vomiting after taking  Patient stated she took a benadryl  Will forward to provider for further review

## 2021-03-19 NOTE — TELEPHONE ENCOUNTER
Called pt back  Pt states she purchased a bariatric capsule multivitamin and tried it for the first time today  After taking the supplement she felt nauseous and then vomited the vitamins  She then saw her skin get red and felt as if "she was on fire"  She purchased the [address] [place] [address] multivitamin  Reviewed the ingredients while on the phone with pt  The "other" ingredients were no different than the more popular Procare or Bariatric Fusion, but did notice the type of B3 is labeled as Niacin, not Niacinamide (non-flushing form of niacin) that is found in other Bariatric products  Pt possibly experiencing Niacin flushing  Pt states she wants to try it one more time tonight before bed, but if he gets the same reaction she will discard the vitamins  Advised pt to f/u with RD via phone on Monday  Advised her that RD has some samples of capsule bariatric vitamins she can try before she purchases more to ensure she tolerates  Pt verbalizes understanding and without questions at this time

## 2021-08-27 DIAGNOSIS — B00.9 HERPES: Primary | ICD-10-CM

## 2021-08-27 RX ORDER — VALACYCLOVIR HYDROCHLORIDE 500 MG/1
500 TABLET, FILM COATED ORAL DAILY
Qty: 30 TABLET | Refills: 0 | Status: SHIPPED | OUTPATIENT
Start: 2021-08-27 | End: 2021-09-20

## 2021-08-27 RX ORDER — VALACYCLOVIR HYDROCHLORIDE 500 MG/1
500 TABLET, FILM COATED ORAL 2 TIMES DAILY
COMMUNITY
End: 2021-08-27 | Stop reason: SDUPTHER

## 2021-09-18 DIAGNOSIS — B00.9 HERPES: ICD-10-CM

## 2021-09-20 RX ORDER — VALACYCLOVIR HYDROCHLORIDE 500 MG/1
TABLET, FILM COATED ORAL
Qty: 30 TABLET | Refills: 0 | Status: SHIPPED | OUTPATIENT
Start: 2021-09-20 | End: 2021-10-20

## 2021-09-21 ENCOUNTER — OFFICE VISIT (OUTPATIENT)
Dept: OTOLARYNGOLOGY | Facility: CLINIC | Age: 46
End: 2021-09-21
Payer: COMMERCIAL

## 2021-09-21 VITALS — TEMPERATURE: 99 F | WEIGHT: 152 LBS | HEIGHT: 62 IN | BODY MASS INDEX: 27.97 KG/M2

## 2021-09-21 DIAGNOSIS — G44.229 CHRONIC TENSION-TYPE HEADACHE, NOT INTRACTABLE: ICD-10-CM

## 2021-09-21 DIAGNOSIS — R42 VERTIGO: Primary | ICD-10-CM

## 2021-09-21 DIAGNOSIS — M43.6 NECK STIFFNESS: ICD-10-CM

## 2021-09-21 DIAGNOSIS — E55.9 VITAMIN D DEFICIENCY: ICD-10-CM

## 2021-09-21 PROBLEM — R51.9 CHRONIC HEADACHES: Status: ACTIVE | Noted: 2021-09-21

## 2021-09-21 PROBLEM — G89.29 CHRONIC HEADACHES: Status: ACTIVE | Noted: 2021-09-21

## 2021-09-21 PROCEDURE — 99214 OFFICE O/P EST MOD 30 MIN: CPT | Performed by: NURSE PRACTITIONER

## 2021-09-21 RX ORDER — PREDNISONE 1 MG/1
TABLET ORAL
COMMUNITY
Start: 2021-08-11

## 2021-09-21 RX ORDER — METHYLPREDNISOLONE 4 MG/1
TABLET ORAL
COMMUNITY
Start: 2021-06-25

## 2021-09-21 RX ORDER — DICLOFENAC SODIUM 75 MG/1
75 TABLET, DELAYED RELEASE ORAL 2 TIMES DAILY
COMMUNITY
Start: 2021-07-06 | End: 2022-07-06

## 2021-09-21 NOTE — ASSESSMENT & PLAN NOTE
Recurrent vertigo, chronic headaches, and neck pain  Vertigo worse when bends forward  Discussed possible causes of vertigo including neurology, cardiac, autoimmune, Otitis media, sinusitis, recent surgery, lab abnormalities, and inner ear concerns  Reviewed lab results from one year ago with no specific findings  PT for vestibular therapy one year ago    Audiogram 10/2021 indicating normal bilateral hearing with mild SNHL at 4K, Tymps type A       Treatment options include at home epley's, dramamine/meclizine, scopolamine patches (OTC), repeat lab studies, vestibular therapy,  VNG testing, neurology consultation, MRI brain with IAC      After discussion agreed to home exercises, consider PT for neck discomfort and vestibular therapy, MRI brain with IAC and consult neurology  Follow up after testing, may discuss results phone

## 2021-09-21 NOTE — PROGRESS NOTES
Assessment/Plan:    Vertigo  Recurrent vertigo, chronic headaches, and neck pain  Vertigo worse when bends forward  Discussed possible causes of vertigo including neurology, cardiac, autoimmune, Otitis media, sinusitis, recent surgery, lab abnormalities, and inner ear concerns  Reviewed lab results from one year ago with no specific findings  PT for vestibular therapy one year ago    Audiogram 10/2021 indicating normal bilateral hearing with mild SNHL at 4K, Tymps type A       Treatment options include at home epley's, dramamine/meclizine, scopolamine patches (OTC), repeat lab studies, vestibular therapy,  VNG testing, neurology consultation, MRI brain with IAC  After discussion agreed to home exercises, consider PT for neck discomfort and vestibular therapy, MRI brain with IAC and consult neurology  Follow up after testing, may discuss results phone          Diagnoses and all orders for this visit:    Vertigo  -     MRI brain IAC wo and w contrast; Future  -     Ambulatory referral to Neurology; Future  -     Ambulatory referral to Physical Therapy; Future    Vitamin D deficiency    Chronic tension-type headache, not intractable  -     MRI brain IAC wo and w contrast; Future  -     Ambulatory referral to Neurology; Future  -     Ambulatory referral to Physical Therapy; Future    Neck stiffness  -     MRI brain IAC wo and w contrast; Future  -     Ambulatory referral to Neurology; Future  -     Ambulatory referral to Physical Therapy; Future    Other orders  -     Diclofenac Sodium (VOLTAREN) 1 %; Apply 1 application topically 4 (four) times a day  -     diclofenac (VOLTAREN) 75 mg EC tablet; Take 75 mg by mouth 2 (two) times a day (Patient not taking: Reported on 9/21/2021)  -     methylPREDNISolone 4 MG tablet therapy pack; TAKE 6 TABLETS ON DAY 1 AS DIRECTED ON PACKAGE AND DECREASE BY 1 TAB EACH DAY FOR A TOTAL OF 6 DAYS (Patient not taking: Reported on 9/21/2021)  -     predniSONE 5 mg tablet;  Take 6 tablets Day 1, then take 5 tablets Day 2, then take 4 tablets Day 3, then take 3 tablets on Day 4, then take 2 tablets on Day 5, then take 1 tablet Day 1  (Patient not taking: Reported on 9/21/2021)          Subjective:      Patient ID: Hakeem Warren is a 39 y o  female  Presents today for follow up due to vertigo  Last seen one year ago for similar concern  Knee surgery in March and was home until July 2021  Since return to work dizziness has returned  Swaying sensation, no spinning  Nausea associated with dizziness  Headaches more recently as well  Feeling in forehead area  Lightheadedness comes and goes  No otlagia  Tried some home exercises with no change  Following chiropractor but not feeling sensation resolve  Lab work and vestibular PT completed one year ago  The following portions of the patient's history were reviewed and updated as appropriate: allergies, current medications, past family history, past medical history, past social history, past surgical history and problem list     Review of Systems   Constitutional: Negative  HENT: Negative for congestion, ear discharge, ear pain, hearing loss, nosebleeds, postnasal drip, rhinorrhea, sinus pressure, sinus pain, sore throat, tinnitus and voice change  Eyes: Negative  Respiratory: Negative for chest tightness and shortness of breath  Cardiovascular: Negative  Gastrointestinal: Negative  Endocrine: Negative  Musculoskeletal: Negative  Skin: Negative for color change  Neurological: Positive for dizziness and headaches  Negative for numbness  Psychiatric/Behavioral: Negative  Objective:      Temp 99 °F (37 2 °C) (Temporal)   Ht 5' 2" (1 575 m)   Wt 68 9 kg (152 lb)   BMI 27 80 kg/m²          Physical Exam  Constitutional:       Appearance: She is well-developed  HENT:      Head: Normocephalic        Right Ear: Hearing, tympanic membrane, ear canal and external ear normal  No decreased hearing noted  No drainage or tenderness  There is no impacted cerumen  Tympanic membrane is not perforated or erythematous  Left Ear: Hearing, tympanic membrane, ear canal and external ear normal  No decreased hearing noted  No drainage or tenderness  There is no impacted cerumen  Tympanic membrane is not perforated or erythematous  Nose: Nose normal  No nasal deformity or septal deviation  Mouth/Throat:      Mouth: Mucous membranes are not pale and not dry  No oral lesions  Dentition: Normal dentition  Pharynx: Uvula midline  No oropharyngeal exudate  Neck:      Trachea: No tracheal deviation  Cardiovascular:      Rate and Rhythm: Normal rate  Pulmonary:      Effort: Pulmonary effort is normal  No accessory muscle usage or respiratory distress  Musculoskeletal:      Right shoulder: Normal range of motion  Cervical back: Full passive range of motion without pain, normal range of motion and neck supple  Lymphadenopathy:      Cervical: No cervical adenopathy  Skin:     General: Skin is warm and dry  Neurological:      Mental Status: She is alert and oriented to person, place, and time  Cranial Nerves: No cranial nerve deficit  Sensory: No sensory deficit  Psychiatric:         Behavior: Behavior is cooperative

## 2021-09-29 ENCOUNTER — OFFICE VISIT (OUTPATIENT)
Dept: BARIATRICS | Facility: CLINIC | Age: 46
End: 2021-09-29
Payer: COMMERCIAL

## 2021-09-29 VITALS
SYSTOLIC BLOOD PRESSURE: 118 MMHG | HEIGHT: 62 IN | WEIGHT: 152.6 LBS | DIASTOLIC BLOOD PRESSURE: 82 MMHG | BODY MASS INDEX: 28.08 KG/M2 | HEART RATE: 73 BPM

## 2021-09-29 DIAGNOSIS — K91.2 POSTSURGICAL MALABSORPTION: Primary | ICD-10-CM

## 2021-09-29 DIAGNOSIS — Z98.84 S/P LAPAROSCOPIC SLEEVE GASTRECTOMY: ICD-10-CM

## 2021-09-29 PROCEDURE — 99213 OFFICE O/P EST LOW 20 MIN: CPT | Performed by: SURGERY

## 2021-09-29 RX ORDER — CYCLOBENZAPRINE HCL 5 MG
5-10 TABLET ORAL 2 TIMES DAILY PRN
COMMUNITY
Start: 2021-09-29 | End: 2021-10-06
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
## 2021-09-30 ENCOUNTER — DOCUMENTATION (OUTPATIENT)
Dept: BARIATRICS | Facility: CLINIC | Age: 46
End: 2021-09-30

## 2021-09-30 NOTE — PROGRESS NOTES
Pt came in today to meet with surgeon for her for her annual visit  Pt c/o of difficulty with supplements  She states the capsules make her vomit and the chewables cause nausea  Provided pt with suggestions of alternatives  Suggested Celebrate 3 in 1 drink mix where she would need to take 2 per day (mixed with water)  The drink mix includes adequate vitamins/minerals and 1000mg of calcium, therefore would then need to take only one 500mg additional calcium and add 45mg of iron since drink mix does not include iron  Also, provided with sample of the Celebrate multi chewy where she would need to take 2 per day, but also continue 1 calcium TID and add 1 45mg of iron  Advised pt to f/u with RD and let her know what works

## 2021-10-05 ENCOUNTER — TELEPHONE (OUTPATIENT)
Dept: NEUROLOGY | Facility: CLINIC | Age: 46
End: 2021-10-05

## 2021-10-05 ENCOUNTER — HOSPITAL ENCOUNTER (OUTPATIENT)
Dept: MRI IMAGING | Facility: HOSPITAL | Age: 46
Discharge: HOME | End: 2021-10-05
Payer: COMMERCIAL

## 2021-10-05 DIAGNOSIS — R42 VERTIGO: ICD-10-CM

## 2021-10-05 DIAGNOSIS — G44.229 CHRONIC TENSION-TYPE HEADACHE, NOT INTRACTABLE: ICD-10-CM

## 2021-10-05 DIAGNOSIS — M43.6 NECK STIFFNESS: ICD-10-CM

## 2021-10-05 PROCEDURE — G1004 CDSM NDSC: HCPCS

## 2021-10-05 PROCEDURE — 70553 MRI BRAIN STEM W/O & W/DYE: CPT

## 2021-10-05 PROCEDURE — A9585 GADOBUTROL INJECTION: HCPCS | Performed by: NURSE PRACTITIONER

## 2021-10-05 RX ADMIN — GADOBUTROL 7 ML: 604.72 INJECTION INTRAVENOUS at 10:42

## 2021-10-14 ENCOUNTER — TELEPHONE (OUTPATIENT)
Dept: OTOLARYNGOLOGY | Facility: CLINIC | Age: 46
End: 2021-10-14

## 2021-10-26 ENCOUNTER — APPOINTMENT (OUTPATIENT)
Dept: LAB | Facility: HOSPITAL | Age: 46
End: 2021-10-26
Attending: SURGERY
Payer: COMMERCIAL

## 2021-10-26 DIAGNOSIS — Z98.84 S/P LAPAROSCOPIC SLEEVE GASTRECTOMY: ICD-10-CM

## 2021-10-26 DIAGNOSIS — K91.2 POSTSURGICAL MALABSORPTION: ICD-10-CM

## 2021-10-26 LAB
25(OH)D3 SERPL-MCNC: 38.7 NG/ML (ref 30–100)
ALBUMIN SERPL BCP-MCNC: 3.9 G/DL (ref 3.5–5)
ALP SERPL-CCNC: 63 U/L (ref 46–116)
ALT SERPL W P-5'-P-CCNC: 22 U/L (ref 12–78)
ANION GAP SERPL CALCULATED.3IONS-SCNC: 6 MMOL/L (ref 4–13)
AST SERPL W P-5'-P-CCNC: 11 U/L (ref 5–45)
BILIRUB SERPL-MCNC: 0.56 MG/DL (ref 0.2–1)
BUN SERPL-MCNC: 10 MG/DL (ref 5–25)
CALCIUM SERPL-MCNC: 8.8 MG/DL (ref 8.3–10.1)
CHLORIDE SERPL-SCNC: 104 MMOL/L (ref 100–108)
CHOLEST SERPL-MCNC: 142 MG/DL (ref 50–200)
CO2 SERPL-SCNC: 30 MMOL/L (ref 21–32)
CREAT SERPL-MCNC: 0.8 MG/DL (ref 0.6–1.3)
ERYTHROCYTE [DISTWIDTH] IN BLOOD BY AUTOMATED COUNT: 11.8 % (ref 11.6–15.1)
FERRITIN SERPL-MCNC: 101 NG/ML (ref 8–388)
GFR SERPL CREATININE-BSD FRML MDRD: 91 ML/MIN/1.73SQ M
GLUCOSE P FAST SERPL-MCNC: 92 MG/DL (ref 65–99)
HCT VFR BLD AUTO: 41.6 % (ref 34.8–46.1)
HDLC SERPL-MCNC: 56 MG/DL
HGB BLD-MCNC: 13.6 G/DL (ref 11.5–15.4)
IRON SATN MFR SERPL: 20 % (ref 15–50)
IRON SERPL-MCNC: 56 UG/DL (ref 50–170)
LDLC SERPL CALC-MCNC: 74 MG/DL (ref 0–100)
MCH RBC QN AUTO: 31.4 PG (ref 26.8–34.3)
MCHC RBC AUTO-ENTMCNC: 32.7 G/DL (ref 31.4–37.4)
MCV RBC AUTO: 96 FL (ref 82–98)
NONHDLC SERPL-MCNC: 86 MG/DL
PLATELET # BLD AUTO: 255 THOUSANDS/UL (ref 149–390)
PMV BLD AUTO: 9.3 FL (ref 8.9–12.7)
POTASSIUM SERPL-SCNC: 4 MMOL/L (ref 3.5–5.3)
PROT SERPL-MCNC: 7.2 G/DL (ref 6.4–8.2)
PTH-INTACT SERPL-MCNC: 43.9 PG/ML (ref 18.4–80.1)
RBC # BLD AUTO: 4.33 MILLION/UL (ref 3.81–5.12)
SODIUM SERPL-SCNC: 140 MMOL/L (ref 136–145)
TIBC SERPL-MCNC: 287 UG/DL (ref 250–450)
TRIGL SERPL-MCNC: 61 MG/DL
VIT B12 SERPL-MCNC: 456 PG/ML (ref 100–900)
WBC # BLD AUTO: 5.32 THOUSAND/UL (ref 4.31–10.16)

## 2021-10-26 PROCEDURE — 82306 VITAMIN D 25 HYDROXY: CPT

## 2021-10-26 PROCEDURE — 80053 COMPREHEN METABOLIC PANEL: CPT

## 2021-10-26 PROCEDURE — 83550 IRON BINDING TEST: CPT

## 2021-10-26 PROCEDURE — 85027 COMPLETE CBC AUTOMATED: CPT

## 2021-10-26 PROCEDURE — 83883 ASSAY NEPHELOMETRY NOT SPEC: CPT

## 2021-10-26 PROCEDURE — 84630 ASSAY OF ZINC: CPT

## 2021-10-26 PROCEDURE — 36415 COLL VENOUS BLD VENIPUNCTURE: CPT

## 2021-10-26 PROCEDURE — 82525 ASSAY OF COPPER: CPT

## 2021-10-26 PROCEDURE — 83918 ORGANIC ACIDS TOTAL QUANT: CPT

## 2021-10-26 PROCEDURE — 80061 LIPID PANEL: CPT

## 2021-10-26 PROCEDURE — 83970 ASSAY OF PARATHORMONE: CPT

## 2021-10-26 PROCEDURE — 82607 VITAMIN B-12: CPT

## 2021-10-26 PROCEDURE — 82728 ASSAY OF FERRITIN: CPT

## 2021-10-26 PROCEDURE — 83540 ASSAY OF IRON: CPT

## 2021-10-26 PROCEDURE — 84425 ASSAY OF VITAMIN B-1: CPT

## 2021-10-26 PROCEDURE — 84590 ASSAY OF VITAMIN A: CPT

## 2021-10-31 LAB
COPPER SERPL-MCNC: 114 UG/DL (ref 80–158)
MISCELLANEOUS LAB TEST RESULT: NORMAL
ZINC SERPL-MCNC: 87 UG/DL (ref 44–115)

## 2021-11-01 LAB — VIT B1 BLD-SCNC: 126 NMOL/L (ref 66.5–200)

## 2021-11-02 LAB — VIT A SERPL-MCNC: 55.9 UG/DL (ref 20.1–62)

## 2021-11-04 LAB
METHYLMALONATE SERPL-SCNC: 113 NMOL/L (ref 0–378)
SL AMB DISCLAIMER: NORMAL

## 2022-01-03 ENCOUNTER — OFFICE VISIT (OUTPATIENT)
Dept: OBGYN CLINIC | Facility: CLINIC | Age: 47
End: 2022-01-03
Payer: COMMERCIAL

## 2022-01-03 VITALS
SYSTOLIC BLOOD PRESSURE: 120 MMHG | DIASTOLIC BLOOD PRESSURE: 78 MMHG | HEIGHT: 62 IN | BODY MASS INDEX: 28.97 KG/M2 | WEIGHT: 157.4 LBS

## 2022-01-03 DIAGNOSIS — R39.9 UTI SYMPTOMS: Primary | ICD-10-CM

## 2022-01-03 DIAGNOSIS — N76.0 ACUTE VAGINITIS: ICD-10-CM

## 2022-01-03 DIAGNOSIS — Z11.3 SCREENING EXAMINATION FOR STD (SEXUALLY TRANSMITTED DISEASE): ICD-10-CM

## 2022-01-03 LAB
SL AMB  POCT GLUCOSE, UA: NORMAL
SL AMB LEUKOCYTE ESTERASE,UA: NORMAL
SL AMB POCT BLOOD,UA: NORMAL
SL AMB POCT KETONES,UA: NORMAL
SL AMB POCT NITRITE,UA: NORMAL
SL AMB POCT URINE PROTEIN: NORMAL
SL AMB POCT UROBILINOGEN: NORMAL

## 2022-01-03 PROCEDURE — 81002 URINALYSIS NONAUTO W/O SCOPE: CPT | Performed by: PHYSICIAN ASSISTANT

## 2022-01-03 PROCEDURE — 87086 URINE CULTURE/COLONY COUNT: CPT | Performed by: PHYSICIAN ASSISTANT

## 2022-01-03 PROCEDURE — 87510 GARDNER VAG DNA DIR PROBE: CPT | Performed by: PHYSICIAN ASSISTANT

## 2022-01-03 PROCEDURE — 87591 N.GONORRHOEAE DNA AMP PROB: CPT | Performed by: PHYSICIAN ASSISTANT

## 2022-01-03 PROCEDURE — 87480 CANDIDA DNA DIR PROBE: CPT | Performed by: PHYSICIAN ASSISTANT

## 2022-01-03 PROCEDURE — 87491 CHLMYD TRACH DNA AMP PROBE: CPT | Performed by: PHYSICIAN ASSISTANT

## 2022-01-03 PROCEDURE — 99214 OFFICE O/P EST MOD 30 MIN: CPT | Performed by: PHYSICIAN ASSISTANT

## 2022-01-03 PROCEDURE — 87660 TRICHOMONAS VAGIN DIR PROBE: CPT | Performed by: PHYSICIAN ASSISTANT

## 2022-01-03 RX ORDER — NITROFURANTOIN 25; 75 MG/1; MG/1
100 CAPSULE ORAL 2 TIMES DAILY
Qty: 14 CAPSULE | Refills: 0 | Status: SHIPPED | OUTPATIENT
Start: 2022-01-03 | End: 2022-01-10

## 2022-01-03 NOTE — PROGRESS NOTES
Assessment/Plan:    No problem-specific Assessment & Plan notes found for this encounter  Diagnoses and all orders for this visit:    UTI symptoms  -     Urine culture  -     POCT urine dip  -     nitrofurantoin (MACROBID) 100 mg capsule; Take 1 capsule (100 mg total) by mouth 2 (two) times a day for 7 days    Acute vaginitis  -     VAGINOSIS DNA PROBE (AFFIRM)    Screening examination for STD (sexually transmitted disease)  -     Chlamydia/GC amplified DNA by PCR        Urine dip positive for leukocyte esterase and blood  Sample sent for culture  Rx for Macrobid 100 mg BID x 7 days sent to pharmacy  Vaginal cultures and GC/chlamydia screening done  We will call with results  Call if symptoms worsen or change  Subjective:      Patient ID: Arabella Barry is a 42 y o  female  Patient is here with complaint of possible vaginal infection and/or UTI  States her boyfriend's condom came off during intercourse about two weeks ago, but patient did not realize  It came out in her shower 9 days later  Started having HA and fever last week; Covid test was negative  Today she complains of burning with urination as well as vaginal discharge  Denies itching, burning, odor, pelvic pain, abdominal pain, n/v, and fever/chills  Requests STD screening  S/p hysterectomy  The following portions of the patient's history were reviewed and updated as appropriate: allergies, current medications, past family history, past medical history, past social history, past surgical history and problem list     Review of Systems   Constitutional: Negative for chills and fever  Gastrointestinal: Negative for abdominal distention, abdominal pain, nausea and vomiting  Genitourinary: Positive for decreased urine volume, dysuria and vaginal discharge  Negative for difficulty urinating, frequency, genital sores, hematuria, menstrual problem, pelvic pain, urgency, vaginal bleeding and vaginal pain           Objective:      BP 120/78 (BP Location: Left arm, Patient Position: Sitting, Cuff Size: Standard)   Ht 5' 2" (1 575 m)   Wt 71 4 kg (157 lb 6 4 oz)   BMI 28 79 kg/m²          Physical Exam  Vitals reviewed  Exam conducted with a chaperone present  Constitutional:       Appearance: Normal appearance  She is well-developed  Genitourinary:     General: Normal vulva  Pubic Area: No rash  Labia:         Right: No rash, tenderness, lesion or injury  Left: No rash, tenderness, lesion or injury  Vagina: Vaginal discharge (Moderate, yellow, watery) present  No erythema, tenderness or bleeding  Uterus: Absent  Adnexa: Right adnexa normal and left adnexa normal         Right: No mass, tenderness or fullness  Left: No mass, tenderness or fullness  Comments: Cervix and uterus surgically absent  Lymphadenopathy:      Lower Body: No right inguinal adenopathy  No left inguinal adenopathy  Skin:     General: Skin is warm and dry  Neurological:      Mental Status: She is alert and oriented to person, place, and time  Psychiatric:         Mood and Affect: Mood normal          Behavior: Behavior normal  Behavior is cooperative  Thought Content:  Thought content normal          Judgment: Judgment normal

## 2022-01-04 LAB
BACTERIA UR CULT: NORMAL
C TRACH DNA SPEC QL NAA+PROBE: NEGATIVE
N GONORRHOEA DNA SPEC QL NAA+PROBE: NEGATIVE

## 2022-01-05 LAB
CANDIDA RRNA VAG QL PROBE: NEGATIVE
G VAGINALIS RRNA GENITAL QL PROBE: POSITIVE
T VAGINALIS RRNA GENITAL QL PROBE: NEGATIVE

## 2022-01-06 ENCOUNTER — TELEPHONE (OUTPATIENT)
Dept: OBGYN CLINIC | Facility: CLINIC | Age: 47
End: 2022-01-06

## 2022-01-06 DIAGNOSIS — N76.0 BV (BACTERIAL VAGINOSIS): Primary | ICD-10-CM

## 2022-01-06 DIAGNOSIS — B96.89 BV (BACTERIAL VAGINOSIS): Primary | ICD-10-CM

## 2022-01-06 RX ORDER — METRONIDAZOLE 500 MG/1
500 TABLET ORAL EVERY 12 HOURS SCHEDULED
Qty: 14 TABLET | Refills: 0 | Status: SHIPPED | OUTPATIENT
Start: 2022-01-06 | End: 2022-01-13

## 2022-01-06 NOTE — TELEPHONE ENCOUNTER
Spoke with patient regarding results  Urine culture negative; patient can stop Macrobid  GC/chlamydia screening negative  Affirm positive for BV  Rx for metronidazole 500 mg BID x 7 days sent to pharmacy  Instructed patient to avoid alcohol while on abx  F/u for yearly as planned  Call with problems

## 2022-01-14 ENCOUNTER — ANNUAL EXAM (OUTPATIENT)
Dept: OBGYN CLINIC | Facility: CLINIC | Age: 47
End: 2022-01-14
Payer: COMMERCIAL

## 2022-01-14 VITALS
HEIGHT: 62 IN | BODY MASS INDEX: 29.19 KG/M2 | DIASTOLIC BLOOD PRESSURE: 80 MMHG | SYSTOLIC BLOOD PRESSURE: 118 MMHG | WEIGHT: 158.6 LBS

## 2022-01-14 DIAGNOSIS — Z12.31 ENCOUNTER FOR SCREENING MAMMOGRAM FOR BREAST CANCER: ICD-10-CM

## 2022-01-14 DIAGNOSIS — Z01.419 ENCOUNTER FOR GYNECOLOGICAL EXAMINATION WITHOUT ABNORMAL FINDING: Primary | ICD-10-CM

## 2022-01-14 PROBLEM — Z48.815 ENCOUNTER FOR SURGICAL AFTERCARE FOLLOWING SURGERY OF DIGESTIVE SYSTEM: Status: RESOLVED | Noted: 2020-08-13 | Resolved: 2022-01-14

## 2022-01-14 PROCEDURE — S0612 ANNUAL GYNECOLOGICAL EXAMINA: HCPCS | Performed by: PHYSICIAN ASSISTANT

## 2022-01-14 RX ORDER — AZITHROMYCIN 250 MG/1
TABLET, FILM COATED ORAL
COMMUNITY
Start: 2022-01-13

## 2022-01-14 NOTE — PROGRESS NOTES
Assessment/Plan:    No problem-specific Assessment & Plan notes found for this encounter  Diagnoses and all orders for this visit:    Encounter for gynecological examination without abnormal finding    Encounter for screening mammogram for breast cancer  -     Mammo screening bilateral w 3d & cad; Future    Other orders  -     azithromycin (ZITHROMAX) 250 mg tablet        Pap not indicated  Order for mammogram entered  Call if BV symptoms return  If no problems, patient to return in 1 year for routine gyn care  Subjective:      Patient ID: Emma Shelby is a 42 y o  female  Patient is here for yearly gyn exam   States she is doing well overall  S/p hysterectomy  Finished course of metronidazole; symptoms have resolved  She denies bowel/bladder changes, vaginal bleeding, pelvic pain, bloating, abdominal pain, n/v, change in appetite, and thyroid disease  No history of abnormal Paps or HPV  Patient is overdue for mammogram   She is performing self-breast exam   Denies new masses, skin changes, nipple discharge, and pain/tenderness  The following portions of the patient's history were reviewed and updated as appropriate: allergies, current medications, past family history, past medical history, past social history, past surgical history and problem list     Review of Systems   Constitutional: Negative for appetite change and unexpected weight change  Cardiovascular:        No masses, skin changes, nipple discharge, and pain/tenderness  Gastrointestinal: Negative for abdominal distention, abdominal pain, constipation, diarrhea, nausea and vomiting  Genitourinary: Negative for difficulty urinating, dysuria, frequency, genital sores, hematuria, menstrual problem, pelvic pain, urgency, vaginal bleeding, vaginal discharge and vaginal pain           Objective:      /80 (BP Location: Left arm, Patient Position: Sitting, Cuff Size: Standard)   Ht 5' 2" (1 575 m)   Wt 71 9 kg (158 lb 9 6 oz)   BMI 29 01 kg/m²          Physical Exam  Vitals reviewed  Exam conducted with a chaperone present  Constitutional:       Appearance: Normal appearance  She is well-developed  Neck:      Thyroid: No thyromegaly  Pulmonary:      Effort: Pulmonary effort is normal    Chest:   Breasts: Breasts are symmetrical       Right: Normal  No swelling, bleeding, inverted nipple, mass, nipple discharge, skin change, tenderness, axillary adenopathy or supraclavicular adenopathy  Left: Normal  No swelling, bleeding, inverted nipple, mass, nipple discharge, skin change, tenderness, axillary adenopathy or supraclavicular adenopathy  Abdominal:      General: Abdomen is flat  There is no distension  Palpations: Abdomen is soft  Tenderness: There is no abdominal tenderness  Genitourinary:     General: Normal vulva  Pubic Area: No rash  Labia:         Right: No rash, tenderness, lesion or injury  Left: No rash, tenderness, lesion or injury  Vagina: Normal  No vaginal discharge, erythema, tenderness or bleeding  Uterus: Absent  Adnexa: Right adnexa normal and left adnexa normal         Right: No mass, tenderness or fullness  Left: No mass, tenderness or fullness  Comments: Cervix and uterus surgically absent  Musculoskeletal:      Cervical back: Neck supple  Lymphadenopathy:      Cervical: No cervical adenopathy  Upper Body:      Right upper body: No supraclavicular or axillary adenopathy  Left upper body: No supraclavicular or axillary adenopathy  Lower Body: No right inguinal adenopathy  No left inguinal adenopathy  Skin:     General: Skin is warm and dry  Neurological:      Mental Status: She is alert and oriented to person, place, and time  Psychiatric:         Mood and Affect: Mood normal          Behavior: Behavior normal  Behavior is cooperative  Thought Content:  Thought content normal          Judgment: Judgment normal

## 2022-02-08 ENCOUNTER — TELEPHONE (OUTPATIENT)
Dept: OBGYN CLINIC | Facility: CLINIC | Age: 47
End: 2022-02-08

## 2022-02-08 DIAGNOSIS — N89.8 VAGINAL ITCHING: ICD-10-CM

## 2022-02-08 DIAGNOSIS — B96.89 BV (BACTERIAL VAGINOSIS): Primary | ICD-10-CM

## 2022-02-08 DIAGNOSIS — N76.0 BV (BACTERIAL VAGINOSIS): Primary | ICD-10-CM

## 2022-02-08 RX ORDER — FLUCONAZOLE 150 MG/1
150 TABLET ORAL ONCE
Qty: 1 TABLET | Refills: 0 | Status: SHIPPED | OUTPATIENT
Start: 2022-02-08 | End: 2022-02-08

## 2022-02-08 RX ORDER — METRONIDAZOLE 500 MG/1
500 TABLET ORAL EVERY 12 HOURS SCHEDULED
Qty: 14 TABLET | Refills: 0 | Status: SHIPPED | OUTPATIENT
Start: 2022-02-08 | End: 2022-02-15

## 2022-02-08 NOTE — TELEPHONE ENCOUNTER
Patient was seen on 1/14/2022,medication was given,  And went away , but now is back, Symptoms, vaginal discharge yellow in color with an odor and vaginal soreness and pressure, no UTI symptoms , please advise

## 2022-02-08 NOTE — TELEPHONE ENCOUNTER
Spoke with the patient RECOMMENDATIONS GIVEN, Will send refill of metronidazole, as well as a Diflucan for possible yeast   Please confirm patient's pharmacy      SPOKE WITH THE PATIENT AGAIN HER [facility] IS THE CORRECT PHARMACY IN THE CHART

## 2022-02-08 NOTE — TELEPHONE ENCOUNTER
Will send refill of metronidazole, as well as a Diflucan for possible yeast   Please confirm patient's pharmacy  Thanks

## 2022-09-12 ENCOUNTER — TELEPHONE (OUTPATIENT)
Dept: OBGYN CLINIC | Facility: CLINIC | Age: 47
End: 2022-09-12

## 2022-09-12 DIAGNOSIS — N76.0 ACUTE VAGINITIS: Primary | ICD-10-CM

## 2022-09-12 RX ORDER — FLUCONAZOLE 150 MG/1
150 TABLET ORAL ONCE
Qty: 1 TABLET | Refills: 0 | Status: SHIPPED | OUTPATIENT
Start: 2022-09-12 | End: 2022-09-12

## 2022-09-12 NOTE — TELEPHONE ENCOUNTER
Patient complaining of vaginal itching and white discharge, was recently taking strong dose of antibiotic  Patient would like RX for yeast infection states symptoms are very bad and leaving for work trip tomorrow

## 2022-11-23 DIAGNOSIS — Z12.31 ENCOUNTER FOR SCREENING MAMMOGRAM FOR BREAST CANCER: ICD-10-CM

## 2023-02-03 ENCOUNTER — ANNUAL EXAM (OUTPATIENT)
Dept: OBGYN CLINIC | Facility: CLINIC | Age: 48
End: 2023-02-03

## 2023-02-03 VITALS
HEIGHT: 62 IN | BODY MASS INDEX: 30.36 KG/M2 | SYSTOLIC BLOOD PRESSURE: 122 MMHG | WEIGHT: 165 LBS | DIASTOLIC BLOOD PRESSURE: 72 MMHG

## 2023-02-03 DIAGNOSIS — N76.0 RECURRENT VAGINITIS: ICD-10-CM

## 2023-02-03 DIAGNOSIS — N39.3 STRESS INCONTINENCE: ICD-10-CM

## 2023-02-03 DIAGNOSIS — Z11.3 SCREEN FOR STD (SEXUALLY TRANSMITTED DISEASE): ICD-10-CM

## 2023-02-03 DIAGNOSIS — Z12.31 ENCOUNTER FOR SCREENING MAMMOGRAM FOR BREAST CANCER: ICD-10-CM

## 2023-02-03 DIAGNOSIS — R39.9 UTI SYMPTOMS: ICD-10-CM

## 2023-02-03 DIAGNOSIS — Z01.411 ENCOUNTER FOR GYNECOLOGICAL EXAMINATION WITH ABNORMAL FINDING: Primary | ICD-10-CM

## 2023-02-03 PROBLEM — Z48.815 ENCOUNTER FOR SURGICAL AFTERCARE FOLLOWING SURGERY OF DIGESTIVE SYSTEM: Status: RESOLVED | Noted: 2020-08-13 | Resolved: 2023-02-03

## 2023-02-03 LAB
SL AMB  POCT GLUCOSE, UA: NORMAL
SL AMB LEUKOCYTE ESTERASE,UA: NORMAL
SL AMB POCT BILIRUBIN,UA: NORMAL
SL AMB POCT BLOOD,UA: NORMAL
SL AMB POCT KETONES,UA: NORMAL
SL AMB POCT NITRITE,UA: NORMAL
SL AMB POCT PH,UA: 7
SL AMB POCT SPECIFIC GRAVITY,UA: 1.01
SL AMB POCT URINE PROTEIN: NORMAL
SL AMB POCT UROBILINOGEN: 2

## 2023-02-03 NOTE — PROGRESS NOTES
Assessment/Plan:    No problem-specific Assessment & Plan notes found for this encounter  Diagnoses and all orders for this visit:    Encounter for gynecological examination with abnormal finding  -     Cancel: Liquid-based pap, screening  -     Liquid-based pap, screening    Recurrent vaginitis  -     VAGINOSIS DNA PROBE (AFFIRM)    UTI symptoms  -     POCT urine dip  -     Urine culture    Screen for STD (sexually transmitted disease)  -     Chlamydia/GC amplified DNA by PCR    Stress incontinence  -     Ambulatory Referral to Physical Therapy; Future    Encounter for screening mammogram for breast cancer  -     Mammo screening bilateral w 3d & cad; Future        Pap and HPV testing done at patient's request, as well as GC/chlamydia screening and vaginal cultures  Urine dip showed trace protein; sample sent for culture  We will call with results  Order for mammogram for 2023 entered  Referral to pelvic floor PT entered for stress incontinence; patient to call for appointment  If no problems, patient to return in 1 year for routine gyn care  Subjective:      Patient ID: Catalino Longoria is a 41 y o  female  Patient is here for yearly gyn exam   States she is doing well overall  S/p hysterectomy  Complains of recurrent vaginal discharge, itching, and burning  Has been on multiple courses of abx recently for sinus and bronchitis issues  Also received an anonymous text stating that her boyfriend gave someone HPV; wishes to be tested  Experiencing urinary urgency and stress incontinence with recent illnesses  She denies bowel changes, vaginal bleeding, pelvic pain, bloating, abdominal pain, n/v, change in appetite, and thyroid disease  Mammogram in November was negative  Patient is performing self-breast exam   Denies new masses, skin changes, nipple discharge, and pain/tenderness        The following portions of the patient's history were reviewed and updated as appropriate: allergies, current medications, past family history, past medical history, past social history, past surgical history and problem list     Review of Systems   Constitutional: Negative for appetite change and unexpected weight change  Cardiovascular:        No masses, skin changes, nipple discharge, and pain/tenderness  Gastrointestinal: Negative for abdominal distention, abdominal pain, constipation, diarrhea, nausea and vomiting  Genitourinary: Positive for frequency, urgency and vaginal discharge  Negative for difficulty urinating, dysuria, genital sores, hematuria, menstrual problem, pelvic pain, vaginal bleeding and vaginal pain  Vulvovaginal itching and burning         Objective:      /72 (BP Location: Left arm, Patient Position: Sitting, Cuff Size: Adult)   Ht 5' 2" (1 575 m)   Wt 74 8 kg (165 lb)   BMI 30 18 kg/m²          Physical Exam  Vitals reviewed  Exam conducted with a chaperone present  Constitutional:       Appearance: Normal appearance  She is well-developed  Neck:      Thyroid: No thyromegaly  Pulmonary:      Effort: Pulmonary effort is normal    Chest:   Breasts:     Breasts are symmetrical       Right: Normal  No swelling, bleeding, inverted nipple, mass, nipple discharge, skin change or tenderness  Left: Normal  No swelling, bleeding, inverted nipple, mass, nipple discharge, skin change or tenderness  Abdominal:      General: Abdomen is flat  There is no distension  Palpations: Abdomen is soft  Tenderness: There is no abdominal tenderness  Genitourinary:     General: Normal vulva  Pubic Area: No rash  Labia:         Right: No rash, tenderness, lesion or injury  Left: No rash, tenderness, lesion or injury  Vagina: Normal  No vaginal discharge, erythema, tenderness or bleeding  Uterus: Absent  Adnexa: Right adnexa normal and left adnexa normal         Right: No mass, tenderness or fullness            Left: No mass, tenderness or fullness  Comments: Cervix and uterus surgically absent  Musculoskeletal:      Cervical back: Neck supple  Lymphadenopathy:      Cervical: No cervical adenopathy  Upper Body:      Right upper body: No supraclavicular or axillary adenopathy  Left upper body: No supraclavicular or axillary adenopathy  Lower Body: No right inguinal adenopathy  No left inguinal adenopathy  Skin:     General: Skin is warm and dry  Neurological:      Mental Status: She is alert and oriented to person, place, and time  Psychiatric:         Mood and Affect: Mood normal          Behavior: Behavior normal  Behavior is cooperative  Thought Content:  Thought content normal          Judgment: Judgment normal

## 2023-02-04 LAB
BACTERIA UR CULT: NORMAL
C TRACH DNA SPEC QL NAA+PROBE: NEGATIVE
CANDIDA RRNA VAG QL PROBE: NEGATIVE
G VAGINALIS RRNA GENITAL QL PROBE: POSITIVE
N GONORRHOEA DNA SPEC QL NAA+PROBE: NEGATIVE
T VAGINALIS RRNA GENITAL QL PROBE: NEGATIVE

## 2023-02-06 ENCOUNTER — TELEPHONE (OUTPATIENT)
Dept: OBGYN CLINIC | Facility: CLINIC | Age: 48
End: 2023-02-06

## 2023-02-06 DIAGNOSIS — B96.89 BV (BACTERIAL VAGINOSIS): Primary | ICD-10-CM

## 2023-02-06 DIAGNOSIS — N76.0 BV (BACTERIAL VAGINOSIS): Primary | ICD-10-CM

## 2023-02-06 LAB
HPV HR 12 DNA CVX QL NAA+PROBE: NEGATIVE
HPV16 DNA CVX QL NAA+PROBE: NEGATIVE
HPV18 DNA CVX QL NAA+PROBE: NEGATIVE

## 2023-02-06 RX ORDER — METRONIDAZOLE 500 MG/1
500 TABLET ORAL EVERY 12 HOURS SCHEDULED
Qty: 14 TABLET | Refills: 0 | Status: SHIPPED | OUTPATIENT
Start: 2023-02-06 | End: 2023-02-13

## 2023-02-06 NOTE — TELEPHONE ENCOUNTER
Spoke with patient regarding results  Urine culture negative  GC/chlamydia screening negative  Vaginal culture positive for BV  Rx for metronidazole 500 mg BID x 7 days sent to pharmacy  Patient to avoid alcohol while on abx  Call if symptoms do not resolve

## 2023-02-16 LAB
LAB AP GYN PRIMARY INTERPRETATION: NORMAL
Lab: NORMAL
PATH INTERP SPEC-IMP: NORMAL

## 2023-09-25 ENCOUNTER — HOSPITAL ENCOUNTER (EMERGENCY)
Facility: HOSPITAL | Age: 48
Discharge: HOME | End: 2023-09-25
Attending: EMERGENCY MEDICINE | Admitting: EMERGENCY MEDICINE
Payer: COMMERCIAL

## 2023-09-25 ENCOUNTER — APPOINTMENT (EMERGENCY)
Dept: RADIOLOGY | Facility: HOSPITAL | Age: 48
End: 2023-09-25
Payer: COMMERCIAL

## 2023-09-25 VITALS
RESPIRATION RATE: 14 BRPM | OXYGEN SATURATION: 100 % | HEART RATE: 78 BPM | BODY MASS INDEX: 32.3 KG/M2 | WEIGHT: 176.59 LBS | TEMPERATURE: 97.6 F | SYSTOLIC BLOOD PRESSURE: 134 MMHG | DIASTOLIC BLOOD PRESSURE: 84 MMHG

## 2023-09-25 DIAGNOSIS — R51.9 HEADACHE: Primary | ICD-10-CM

## 2023-09-25 LAB
ALBUMIN SERPL BCP-MCNC: 4 G/DL (ref 3.5–5)
ALP SERPL-CCNC: 48 U/L (ref 34–104)
ALT SERPL W P-5'-P-CCNC: 10 U/L (ref 7–52)
ANION GAP SERPL CALCULATED.3IONS-SCNC: 6 MMOL/L
AST SERPL W P-5'-P-CCNC: 11 U/L (ref 13–39)
BASOPHILS # BLD AUTO: 0.05 THOUSANDS/ÂΜL (ref 0–0.1)
BASOPHILS NFR BLD AUTO: 1 % (ref 0–1)
BILIRUB SERPL-MCNC: 0.39 MG/DL (ref 0.2–1)
BUN SERPL-MCNC: 11 MG/DL (ref 5–25)
CALCIUM SERPL-MCNC: 9 MG/DL (ref 8.4–10.2)
CHLORIDE SERPL-SCNC: 103 MMOL/L (ref 96–108)
CO2 SERPL-SCNC: 27 MMOL/L (ref 21–32)
CREAT SERPL-MCNC: 0.74 MG/DL (ref 0.6–1.3)
EOSINOPHIL # BLD AUTO: 0.02 THOUSAND/ÂΜL (ref 0–0.61)
EOSINOPHIL NFR BLD AUTO: 0 % (ref 0–6)
ERYTHROCYTE [DISTWIDTH] IN BLOOD BY AUTOMATED COUNT: 12 % (ref 11.6–15.1)
GFR SERPL CREATININE-BSD FRML MDRD: 98 ML/MIN/1.73SQ M
GLUCOSE SERPL-MCNC: 85 MG/DL (ref 65–140)
HCT VFR BLD AUTO: 39.6 % (ref 34.8–46.1)
HGB BLD-MCNC: 13.6 G/DL (ref 11.5–15.4)
IMM GRANULOCYTES # BLD AUTO: 0.02 THOUSAND/UL (ref 0–0.2)
IMM GRANULOCYTES NFR BLD AUTO: 0 % (ref 0–2)
LYMPHOCYTES # BLD AUTO: 2.6 THOUSANDS/ÂΜL (ref 0.6–4.47)
LYMPHOCYTES NFR BLD AUTO: 41 % (ref 14–44)
MCH RBC QN AUTO: 33.3 PG (ref 26.8–34.3)
MCHC RBC AUTO-ENTMCNC: 34.3 G/DL (ref 31.4–37.4)
MCV RBC AUTO: 97 FL (ref 82–98)
MONOCYTES # BLD AUTO: 0.43 THOUSAND/ÂΜL (ref 0.17–1.22)
MONOCYTES NFR BLD AUTO: 7 % (ref 4–12)
NEUTROPHILS # BLD AUTO: 3.19 THOUSANDS/ÂΜL (ref 1.85–7.62)
NEUTS SEG NFR BLD AUTO: 51 % (ref 43–75)
NRBC BLD AUTO-RTO: 0 /100 WBCS
PLATELET # BLD AUTO: 203 THOUSANDS/UL (ref 149–390)
PMV BLD AUTO: 9.6 FL (ref 8.9–12.7)
POTASSIUM SERPL-SCNC: 3.6 MMOL/L (ref 3.5–5.3)
PROT SERPL-MCNC: 6.4 G/DL (ref 6.4–8.4)
RBC # BLD AUTO: 4.09 MILLION/UL (ref 3.81–5.12)
SODIUM SERPL-SCNC: 136 MMOL/L (ref 135–147)
WBC # BLD AUTO: 6.31 THOUSAND/UL (ref 4.31–10.16)

## 2023-09-25 PROCEDURE — 36415 COLL VENOUS BLD VENIPUNCTURE: CPT | Performed by: EMERGENCY MEDICINE

## 2023-09-25 PROCEDURE — 99284 EMERGENCY DEPT VISIT MOD MDM: CPT

## 2023-09-25 PROCEDURE — 96374 THER/PROPH/DIAG INJ IV PUSH: CPT

## 2023-09-25 PROCEDURE — 70450 CT HEAD/BRAIN W/O DYE: CPT

## 2023-09-25 PROCEDURE — G1004 CDSM NDSC: HCPCS

## 2023-09-25 PROCEDURE — 96375 TX/PRO/DX INJ NEW DRUG ADDON: CPT

## 2023-09-25 PROCEDURE — 99284 EMERGENCY DEPT VISIT MOD MDM: CPT | Performed by: EMERGENCY MEDICINE

## 2023-09-25 PROCEDURE — 85025 COMPLETE CBC W/AUTO DIFF WBC: CPT | Performed by: EMERGENCY MEDICINE

## 2023-09-25 PROCEDURE — 80053 COMPREHEN METABOLIC PANEL: CPT | Performed by: EMERGENCY MEDICINE

## 2023-09-25 RX ORDER — METOCLOPRAMIDE HYDROCHLORIDE 5 MG/ML
10 INJECTION INTRAMUSCULAR; INTRAVENOUS ONCE
Status: COMPLETED | OUTPATIENT
Start: 2023-09-25 | End: 2023-09-25

## 2023-09-25 RX ORDER — DEXAMETHASONE SODIUM PHOSPHATE 4 MG/ML
10 INJECTION, SOLUTION INTRA-ARTICULAR; INTRALESIONAL; INTRAMUSCULAR; INTRAVENOUS; SOFT TISSUE ONCE
Status: COMPLETED | OUTPATIENT
Start: 2023-09-25 | End: 2023-09-25

## 2023-09-25 RX ORDER — DIPHENHYDRAMINE HYDROCHLORIDE 50 MG/ML
25 INJECTION INTRAMUSCULAR; INTRAVENOUS ONCE
Status: COMPLETED | OUTPATIENT
Start: 2023-09-25 | End: 2023-09-25

## 2023-09-25 RX ADMIN — DIPHENHYDRAMINE HYDROCHLORIDE 25 MG: 50 INJECTION, SOLUTION INTRAMUSCULAR; INTRAVENOUS at 16:50

## 2023-09-25 RX ADMIN — DEXAMETHASONE SODIUM PHOSPHATE 10 MG: 4 INJECTION INTRA-ARTICULAR; INTRALESIONAL; INTRAMUSCULAR; INTRAVENOUS; SOFT TISSUE at 16:51

## 2023-09-25 RX ADMIN — METOCLOPRAMIDE 10 MG: 5 INJECTION, SOLUTION INTRAMUSCULAR; INTRAVENOUS at 16:54

## 2023-09-25 NOTE — Clinical Note
Charis Rudolph was seen and treated in our emergency department on 9/25/2023. Diagnosis:     Norris Lisa  may return to work on return date. She may return on this date: 09/27/2023         If you have any questions or concerns, please don't hesitate to call.       Mely Clarke, DO    ______________________________           _______________          _______________  Hospital Representative                              Date                                Time

## 2023-09-25 NOTE — ED PROVIDER NOTES
History  Chief Complaint   Patient presents with   • Headache     Had couple of manipulations by chiropracter last week, today at work started with headache and dizziness with some blurry vision     Patient is a 56-year-old female presents emergency department for evaluation of a frontal headache. Symptoms began at approximately 12 noon, while she was seated in the meeting. Pain was of a gradual onset and has since worsened. She localizes the worst of her pain to bitemporal and periorbital aspects of her head. She denies a history of similar pain. Patient states that she gets infrequent occipital headaches, which she typically treats with an adjustment from the chiropractor. Most recent adjustment was on Wednesday, with complete resolution of her headache and no recurrence until today. She denies nausea, vomiting, fevers or chills. She denies sick contacts. Patient with mild photophobia and "lightheadedness" but she denies ataxia or focal neurodeficits. History provided by:  Patient   used: No        Prior to Admission Medications   Prescriptions Last Dose Informant Patient Reported? Taking?    Calcium-Vitamin D-Vitamin K (Viactiv Calcium Plus D) 650-12.5-40 MG-MCG-MCG CHEW  Self Yes No   Sig: Chew 1 tablet 3 (three) times a day    Patient not taking: Reported on 2/3/2023   Diclofenac Sodium (VOLTAREN) 1 %   Yes No   Sig: Apply 1 application topically 4 (four) times a day   Multiple Vitamin (multivitamin) tablet  Self Yes No   Sig: Take 1 tablet by mouth daily Bariatric   azithromycin (ZITHROMAX) 250 mg tablet   Yes No   Patient not taking: Reported on 2/3/2023   clonazePAM (KlonoPIN) 0.5 mg tablet  Self Yes No   Sig: Take 0.5 mg by mouth 2 (two) times a day as needed for anxiety    cyclobenzaprine (FLEXERIL) 5 mg tablet   Yes No   Sig: Take 5-10 mg by mouth 2 (two) times a day as needed   Patient not taking: Reported on 9/29/2021   diclofenac (VOLTAREN) 75 mg EC tablet   Yes No Sig: Take 75 mg by mouth 2 (two) times a day   Patient not taking: Reported on 2021   fluticasone (FLONASE) 50 mcg/act nasal spray  Self Yes No   Si spray into each nostril daily at bedtime   fluticasone-salmeterol (ADVAIR) 250-50 mcg/dose inhaler Not Taking Self Yes No   Sig: Inhale 1 puff every 12 (twelve) hours   Patient not taking: Reported on 2023   meclizine (ANTIVERT) 25 mg tablet   Yes No   Sig: Take 25 mg by mouth 2 (two) times a day as needed   methylPREDNISolone 4 MG tablet therapy pack   Yes No   Sig: TAKE 6 TABLETS ON DAY 1 AS DIRECTED ON PACKAGE AND DECREASE BY 1 TAB EACH DAY FOR A TOTAL OF 6 DAYS   Patient not taking: Reported on 2021   predniSONE 5 mg tablet   Yes No   Sig: Take 6 tablets Day 1, then take 5 tablets Day 2, then take 4 tablets Day 3, then take 3 tablets on Day 4, then take 2 tablets on Day 5, then take 1 tablet Day 1. Patient not taking: No sig reported   pseudoephedrine-guaifenesin (MUCINEX D)  MG per tablet   Yes No   Sig: Take 1 tablet by mouth if needed   valACYclovir (VALTREX) 500 mg tablet   No No   Sig: TAKE 1 TABLET BY MOUTH EVERY DAY      Facility-Administered Medications: None       Past Medical History:   Diagnosis Date   • Acid reflux    • Asthma    • Baker cyst, left    • Constipation    • Cough    • CPAP (continuous positive airway pressure) dependence    • Digestive disorder    • Headache    • History of mammogram 2018   • Increased urinary frequency    • Joint pain    • Obesity (BMI 30-39. 9)    • Postgastrectomy malabsorption    • Seasonal allergies    • Shortness of breath    • Sleep apnea    • Vertigo        Past Surgical History:   Procedure Laterality Date   • CARPAL TUNNEL RELEASE     • EGD     • ENDOMETRIAL ABLATION W/ NOVASURE      Laparscopy, D&C, Novasure ablation   • FOOT SURGERY     • HYSTERECTOMY  2016    B/L salpingectomy   • KNEE SURGERY     • PARTIAL HYSTERECTOMY     • NC LAPS 175 Lucy Reilly RSTRICTIV PX LONGITUDINAL GASTRECTOMY N/A 8/4/2020    Procedure: LAPAROSCOPIC SLEEVE  GASTRECTOMY;  Surgeon: Kecia Calles MD;  Location: [facility];  Service: Bariatrics   • TENDON REPAIR  2016   • TUBAL LIGATION  08/06/2010    Laparoscopy tubal cautery       Family History   Problem Relation Age of Onset   • No Known Problems Mother    • Canavan disease Father    • Cancer Father    • Breast cancer Maternal Grandmother    • Breast cancer Paternal Grandmother      I have reviewed and agree with the history as documented. E-Cigarette/Vaping   • E-Cigarette Use Never User      E-Cigarette/Vaping Substances   • Nicotine No    • THC No    • CBD No    • Flavoring No    • Other No    • Unknown No      Social History     Tobacco Use   • Smoking status: Never   • Smokeless tobacco: Never   Vaping Use   • Vaping Use: Never used   Substance Use Topics   • Alcohol use: Yes     Comment: 0cc   • Drug use: No       Review of Systems   Constitutional: Negative for chills and fever. Respiratory: Negative for cough, chest tightness and shortness of breath. Gastrointestinal: Negative for abdominal pain, diarrhea, nausea and vomiting. Genitourinary: Negative for dysuria, frequency, hematuria and urgency. Musculoskeletal: Negative for back pain, neck pain and neck stiffness. Neurological: Positive for light-headedness and headaches. Negative for dizziness, facial asymmetry and weakness. All other systems reviewed and are negative. Physical Exam  Physical Exam  Vitals and nursing note reviewed. Constitutional:       General: She is not in acute distress. Appearance: She is well-developed. She is not diaphoretic. HENT:      Head: Normocephalic and atraumatic. Eyes:      Conjunctiva/sclera: Conjunctivae normal.      Pupils: Pupils are equal, round, and reactive to light. Cardiovascular:      Rate and Rhythm: Normal rate and regular rhythm. Heart sounds: Normal heart sounds. No murmur heard.   Pulmonary:      Effort: Pulmonary effort is normal. No respiratory distress. Breath sounds: Normal breath sounds. Abdominal:      General: Bowel sounds are normal. There is no distension. Palpations: Abdomen is soft. Tenderness: There is no abdominal tenderness. Musculoskeletal:         General: No deformity. Normal range of motion. Cervical back: Normal range of motion and neck supple. Skin:     General: Skin is warm and dry. Capillary Refill: Capillary refill takes less than 2 seconds. Coloration: Skin is not pale. Findings: No rash. Neurological:      General: No focal deficit present. Mental Status: She is alert and oriented to person, place, and time. Cranial Nerves: No cranial nerve deficit. Sensory: No sensory deficit.       Coordination: Coordination normal.      Gait: Gait normal.   Psychiatric:         Behavior: Behavior normal.         Vital Signs  ED Triage Vitals [09/25/23 1618]   Temperature Pulse Respirations Blood Pressure SpO2   97.6 °F (36.4 °C) (!) 2 22 (!) 184/104 100 %      Temp Source Heart Rate Source Patient Position - Orthostatic VS BP Location FiO2 (%)   Tympanic Monitor Sitting Right arm --      Pain Score       5           Vitals:    09/25/23 1618 09/25/23 1752   BP: (!) 184/104 134/84   Pulse: (!) 2 78   Patient Position - Orthostatic VS: Sitting          Visual Acuity      ED Medications  Medications   dexamethasone (DECADRON) injection 10 mg (10 mg Intravenous Given 9/25/23 1651)   metoclopramide (REGLAN) injection 10 mg (10 mg Intravenous Given 9/25/23 1654)   diphenhydrAMINE (BENADRYL) injection 25 mg (25 mg Intravenous Given 9/25/23 1650)       Diagnostic Studies  Results Reviewed     Procedure Component Value Units Date/Time    Comprehensive metabolic panel [[id number]]  (Abnormal) Collected: 09/25/23 1645    Lab Status: Final result Specimen: Blood from Arm, Left Updated: 09/25/23 1711     Sodium 136 mmol/L      Potassium 3.6 mmol/L      Chloride 103 mmol/L      CO2 27 mmol/L      ANION GAP 6 mmol/L      BUN 11 mg/dL      Creatinine 0.74 mg/dL      Glucose 85 mg/dL      Calcium 9.0 mg/dL      AST 11 U/L      ALT 10 U/L      Alkaline Phosphatase 48 U/L      Total Protein 6.4 g/dL      Albumin 4.0 g/dL      Total Bilirubin 0.39 mg/dL      eGFR 98 ml/min/1.73sq m     Narrative:      National Kidney Disease Foundation guidelines for Chronic Kidney Disease (CKD):   •  Stage 1 with normal or high GFR (GFR > 90 mL/min/1.73 square meters)  •  Stage 2 Mild CKD (GFR = 60-89 mL/min/1.73 square meters)  •  Stage 3A Moderate CKD (GFR = 45-59 mL/min/1.73 square meters)  •  Stage 3B Moderate CKD (GFR = 30-44 mL/min/1.73 square meters)  •  Stage 4 Severe CKD (GFR = 15-29 mL/min/1.73 square meters)  •  Stage 5 End Stage CKD (GFR <15 mL/min/1.73 square meters)  Note: GFR calculation is accurate only with a steady state creatinine    CBC and differential [[id number]] Collected: 09/25/23 1645    Lab Status: Final result Specimen: Blood from Arm, Left Updated: 09/25/23 1651     WBC 6.31 Thousand/uL      RBC 4.09 Million/uL      Hemoglobin 13.6 g/dL      Hematocrit 39.6 %      MCV 97 fL      MCH 33.3 pg      MCHC 34.3 g/dL      RDW 12.0 %      MPV 9.6 fL      Platelets 240 Thousands/uL      nRBC 0 /100 WBCs      Neutrophils Relative 51 %      Immat GRANS % 0 %      Lymphocytes Relative 41 %      Monocytes Relative 7 %      Eosinophils Relative 0 %      Basophils Relative 1 %      Neutrophils Absolute 3.19 Thousands/µL      Immature Grans Absolute 0.02 Thousand/uL      Lymphocytes Absolute 2.60 Thousands/µL      Monocytes Absolute 0.43 Thousand/µL      Eosinophils Absolute 0.02 Thousand/µL      Basophils Absolute 0.05 Thousands/µL                  CT head without contrast   Final Result by Georgia Fields DO (09/25 1724)      No acute intracranial abnormality.                   Workstation performed: [id number]                    Procedures  Procedures         ED Course Medical Decision Making  53-year-old female with complaint of a frontal headache. Differential diagnosis includes but is not limited to migraine headache, space-occupying lesion. Labs and CT head ordered and reviewed. Patient treated with migraine cocktail with complete resolution of symptoms. Patient be discharged home to follow-up with primary care physician and neurology. Return precautions reviewed with patient. She agrees with plan. Amount and/or Complexity of Data Reviewed  Labs: ordered. Radiology: ordered. Risk  Prescription drug management. Disposition  Final diagnoses:   Headache     Time reflects when diagnosis was documented in both MDM as applicable and the Disposition within this note     Time User Action Codes Description Comment    9/25/2023  5:31 PM Nirav Tolliver Add [R51.9] Headache       ED Disposition     ED Disposition   Discharge    Condition   Stable    Date/Time   Mon Sep 25, 2023  5:31 PM    Comment   Morteza Mcbride discharge to home/self care. Follow-up Information     Follow up With Specialties Details Why Contact Info Additional Information    Andrew Patton MD Internal Medicine Schedule an appointment as soon as possible for a visit in 1 day for follow up [address]   [address] [facility] Schedule an appointment as soon as possible for a visit in 1 day for follow up [address] [address]  [facility] [address] [place] [address] Rd, [address], [facility] [address] [phone]          Discharge Medication List as of 9/25/2023  5:31 PM      CONTINUE these medications which have NOT CHANGED    Details   azithromycin (ZITHROMAX) 250 mg tablet Starting Thu 1/13/2022, Historical Med      Calcium-Vitamin D-Vitamin K (Viactiv Calcium Plus D) 650-12.5-40 MG-MCG-MCG CHEW Chew 1 tablet 3 (three) times a day , Historical Med      clonazePAM (KlonoPIN) 0.5 mg tablet Take 0.5 mg by mouth 2 (two) times a day as needed for anxiety , Historical Med      cyclobenzaprine (FLEXERIL) 5 mg tablet Take 5-10 mg by mouth 2 (two) times a day as needed, Starting Wed 9/29/2021, Until Wed 10/6/2021 at 2359, Historical Med      diclofenac (VOLTAREN) 75 mg EC tablet Take 75 mg by mouth 2 (two) times a day, Starting Tue 7/6/2021, Until Wed 7/6/2022, Historical Med      Diclofenac Sodium (VOLTAREN) 1 % Apply 1 application topically 4 (four) times a day, Starting Fri 5/14/2021, Until Sat 5/14/2022, Historical Med      fluticasone (FLONASE) 50 mcg/act nasal spray 1 spray into each nostril daily at bedtime, Historical Med      fluticasone-salmeterol (ADVAIR) 250-50 mcg/dose inhaler Inhale 1 puff every 12 (twelve) hours, Historical Med      meclizine (ANTIVERT) 25 mg tablet Take 25 mg by mouth 2 (two) times a day as needed, Starting Mon 10/5/2020, Until Thu 11/19/2020, Historical Med      methylPREDNISolone 4 MG tablet therapy pack TAKE 6 TABLETS ON DAY 1 AS DIRECTED ON PACKAGE AND DECREASE BY 1 TAB EACH DAY FOR A TOTAL OF 6 DAYS, Historical Med      Multiple Vitamin (multivitamin) tablet Take 1 tablet by mouth daily Bariatric, Historical Med      predniSONE 5 mg tablet Take 6 tablets Day 1, then take 5 tablets Day 2, then take 4 tablets Day 3, then take 3 tablets on Day 4, then take 2 tablets on Day 5, then take 1 tablet Day 1., Historical Med      pseudoephedrine-guaifenesin (MUCINEX D)  MG per tablet Take 1 tablet by mouth if needed, Starting Mon 11/30/2020, Historical Med      valACYclovir (VALTREX) 500 mg tablet TAKE 1 TABLET BY MOUTH EVERY DAY, Normal             No discharge procedures on file.     PDMP Review       Value Time User    PDMP Reviewed  Yes 8/5/2020  8:34 AM Oma Burleson PA-C          ED Provider  Electronically Signed by           Spencer Doran DO  09/25/23 1983

## 2023-10-16 ENCOUNTER — TELEPHONE (OUTPATIENT)
Dept: NEUROLOGY | Facility: CLINIC | Age: 48
End: 2023-10-16

## 2023-10-16 NOTE — TELEPHONE ENCOUNTER
Called and spoke with the patient to schedule np appt per triage 1-3 weeks with Headache attending or AP. Offered tomorrow with Kathy in [facility]. Patient would like to call back. Provided call back number and message sent to [facility] to make her aware of possible add on.

## 2023-11-15 ENCOUNTER — TELEPHONE (OUTPATIENT)
Dept: NEUROLOGY | Facility: CLINIC | Age: 48
End: 2023-11-15

## 2023-11-15 NOTE — TELEPHONE ENCOUNTER
2nd attempt to reach patient from triage. No answer. Left message on machine. Message sent via [address] [place] [address].

## 2023-11-29 ENCOUNTER — OFFICE VISIT (OUTPATIENT)
Dept: OTOLARYNGOLOGY | Facility: CLINIC | Age: 48
End: 2023-11-29
Payer: COMMERCIAL

## 2023-11-29 DIAGNOSIS — G89.29 CHRONIC NONINTRACTABLE HEADACHE, UNSPECIFIED HEADACHE TYPE: ICD-10-CM

## 2023-11-29 DIAGNOSIS — R51.9 CHRONIC NONINTRACTABLE HEADACHE, UNSPECIFIED HEADACHE TYPE: ICD-10-CM

## 2023-11-29 DIAGNOSIS — R42 VERTIGO: Primary | ICD-10-CM

## 2023-11-29 DIAGNOSIS — M43.6 NECK STIFFNESS: ICD-10-CM

## 2023-11-29 PROCEDURE — 99214 OFFICE O/P EST MOD 30 MIN: CPT | Performed by: NURSE PRACTITIONER

## 2023-11-29 RX ORDER — LOSARTAN POTASSIUM AND HYDROCHLOROTHIAZIDE 12.5; 5 MG/1; MG/1
1 TABLET ORAL DAILY
COMMUNITY
Start: 2023-11-27

## 2023-11-29 RX ORDER — TOPIRAMATE 25 MG/1
25 CAPSULE, COATED PELLETS ORAL 2 TIMES DAILY
COMMUNITY

## 2023-11-29 RX ORDER — NORTRIPTYLINE HYDROCHLORIDE 10 MG/1
CAPSULE ORAL
COMMUNITY
Start: 2023-11-20

## 2023-11-29 RX ORDER — AZELASTINE 1 MG/ML
1 SPRAY, METERED NASAL 2 TIMES DAILY
Qty: 1 ML | Refills: 2 | Status: SHIPPED | OUTPATIENT
Start: 2023-11-29

## 2023-11-29 RX ORDER — NORTRIPTYLINE HYDROCHLORIDE 25 MG/1
CAPSULE ORAL
COMMUNITY
Start: 2023-11-03

## 2023-11-29 RX ORDER — FLUTICASONE PROPIONATE 50 MCG
1 SPRAY, SUSPENSION (ML) NASAL 2 TIMES DAILY
Qty: 1 G | Refills: 6 | Status: SHIPPED | OUTPATIENT
Start: 2023-11-29

## 2023-11-29 NOTE — ASSESSMENT & PLAN NOTE
Recurrent vertigo, chronic headaches, and neck pain. Vertigo worse when bends forward. Discussed possible causes of vertigo including neurology, cardiac, autoimmune, Otitis media, sinusitis, recent surgery, lab abnormalities, and inner ear concerns. Reviewed lab results from one year ago with no specific findings. Recent neurology evaluation and ordered for MRI cervical spine and brain. Mri brain results not avail today during visit. MRI cervical results avail and defer to PT and neurology for cervical spine concerns. MRI brain with IAC in 2021 without any IAC/ear related findings. PT for vestibular therapy one year ago, feels symptoms worse. More recently sought PT care at outside facility and felt no change. Audiogram 10/2021 indicating normal bilateral hearing with mild SNHL at 4K, Tymps type A. Offered repeat audiogram.      Treatment options include at home epley's, dramamine/meclizine, scopolamine patches (OTC), repeat lab studies, vestibular therapy,  VNG testing, neurology consultation. Considering cervicogenic vertigo vs vestibular migraine, Not benign positional vertigo. Encouraged pt to Research Botox injections vs nerve block for migraines    After discussion agreed to home exercises, consider PT for neck discomfort, continue consult neurology, High concern for vestibular migraines vs cervicogenic vertigo.    To Obtain copies of lab results, obtain MRI brain results  Requested Office notes from neurology and physical therapy  Ear pressure/ETD  Fluticasone nasal spray one spray each nostril twice per day  Astlein nasal spray prescribed    Follow up in 6 to 8 weeks if needed

## 2023-11-29 NOTE — PATIENT INSTRUCTIONS
Obtain copies of lab results, obtain MRI brain results    Office notes from neurology and physical therapy    Considering cervicogenic vertigo vs vestibular migraine, Not benign positional vertigo.  Research Botox injections vs nerve block for migraines    Ear pressure/ETD  Fluticasone nasal spray one spray each nostril twice per day  Astlein nasal spray prescribed

## 2023-11-29 NOTE — PROGRESS NOTES
Assessment/Plan:    Vertigo  Recurrent vertigo, chronic headaches, and neck pain. Vertigo worse when bends forward. Discussed possible causes of vertigo including neurology, cardiac, autoimmune, Otitis media, sinusitis, recent surgery, lab abnormalities, and inner ear concerns. Reviewed lab results from one year ago with no specific findings. Recent neurology evaluation and ordered for MRI cervical spine and brain. Mri brain results not avail today during visit. MRI cervical results avail and defer to PT and neurology for cervical spine concerns. MRI brain with IAC in 2021 without any IAC/ear related findings. PT for vestibular therapy one year ago, feels symptoms worse. More recently sought PT care at outside facility and felt no change. Audiogram 10/2021 indicating normal bilateral hearing with mild SNHL at 4K, Tymps type A. Offered repeat audiogram.      Treatment options include at home epley's, dramamine/meclizine, scopolamine patches (OTC), repeat lab studies, vestibular therapy,  VNG testing, neurology consultation. Considering cervicogenic vertigo vs vestibular migraine, Not benign positional vertigo. Encouraged pt to Research Botox injections vs nerve block for migraines    After discussion agreed to home exercises, consider PT for neck discomfort, continue consult neurology, High concern for vestibular migraines vs cervicogenic vertigo. To Obtain copies of lab results, obtain MRI brain results  Requested Office notes from neurology and physical therapy  Ear pressure/ETD  Fluticasone nasal spray one spray each nostril twice per day  Astlein nasal spray prescribed    Follow up in 6 to 8 weeks if needed             Diagnoses and all orders for this visit:    Vertigo  -     Ambulatory Referral to Physical Therapy;  Future  -     azelastine (ASTELIN) 0.1 % nasal spray; 1 spray into each nostril 2 (two) times a day Use in each nostril as directed  -     fluticasone (FLONASE) 50 mcg/act nasal spray; 1 spray into each nostril 2 (two) times a day    Chronic nonintractable headache, unspecified headache type  -     azelastine (ASTELIN) 0.1 % nasal spray; 1 spray into each nostril 2 (two) times a day Use in each nostril as directed  -     fluticasone (FLONASE) 50 mcg/act nasal spray; 1 spray into each nostril 2 (two) times a day    Neck stiffness  -     Ambulatory Referral to Physical Therapy; Future  -     azelastine (ASTELIN) 0.1 % nasal spray; 1 spray into each nostril 2 (two) times a day Use in each nostril as directed  -     fluticasone (FLONASE) 50 mcg/act nasal spray; 1 spray into each nostril 2 (two) times a day    Other orders  -     losartan-hydrochlorothiazide (HYZAAR) 50-12.5 mg per tablet; Take 1 tablet by mouth daily  -     nortriptyline (PAMELOR) 10 mg capsule;  (Patient not taking: Reported on 11/29/2023)  -     nortriptyline (PAMELOR) 25 mg capsule; TAKE 1 CAPSULE BY ORAL ROUTE BEFORE BED DAILY. MONITOR FOR MOOD EFFECTS. (Patient not taking: Reported on 11/29/2023)  -     topiramate (TOPAMAX) 25 mg sprinkle capsule; Take 25 mg by mouth 2 (two) times a day          Subjective:      Patient ID: Bijan Jack is a 57 y.o. female. Presents today for follow up due to vertigo. Last seen 2 year ago for similar concern. Swaying sensation, no spinning. Nausea associated with dizziness. Headaches more recently as well. Feeling in forehead area. Lightheadedness comes and goes. No otlagia. Tried some home exercises with no change. Following chiropractor but not feeling sensation resolve. Lab work and vestibular PT completed two year ago. Since last visit, intermittent episodes approx 2 per year of short lived vertigo. Sept 2023, while at work developed bad headache. Treated with Advil. Developed dizziness and blurred vision. Treated in ER, Ct done in ER. Treated by PCP and chiropractor. Treated in PT at outside facility.            The following portions of the patient's history were reviewed and updated as appropriate: allergies, current medications, past family history, past medical history, past social history, past surgical history, and problem list.    Review of Systems   Constitutional: Negative. HENT:  Negative for congestion, ear discharge, ear pain, hearing loss, nosebleeds, postnasal drip, rhinorrhea, sinus pressure, sinus pain, sore throat, tinnitus and voice change. Respiratory:  Negative for chest tightness and shortness of breath. Skin:  Negative for color change. Neurological:  Negative for dizziness, numbness and headaches. Psychiatric/Behavioral: Negative. Objective: There were no vitals taken for this visit. Physical Exam  Constitutional:       Appearance: She is well-developed. HENT:      Head: Normocephalic. Right Ear: Hearing, tympanic membrane, ear canal and external ear normal. No decreased hearing noted. No drainage or tenderness. Tympanic membrane is not perforated or erythematous. Left Ear: Hearing, tympanic membrane, ear canal and external ear normal. No decreased hearing noted. No drainage or tenderness. Tympanic membrane is not perforated or erythematous. Nose: Nose normal. No nasal deformity or septal deviation. Mouth/Throat:      Mouth: Mucous membranes are not pale and not dry. No oral lesions. Dentition: Normal dentition. Pharynx: Uvula midline. No oropharyngeal exudate. Neck:      Trachea: No tracheal deviation. Pulmonary:      Effort: Pulmonary effort is normal. No accessory muscle usage or respiratory distress. Musculoskeletal:      Cervical back: Full passive range of motion without pain and neck supple. Lymphadenopathy:      Cervical: No cervical adenopathy. Skin:     General: Skin is warm and dry. Neurological:      Mental Status: She is alert and oriented to person, place, and time. Cranial Nerves: No cranial nerve deficit. Sensory: No sensory deficit. Psychiatric:         Behavior: Behavior is cooperative.

## 2023-12-07 ENCOUNTER — EVALUATION (OUTPATIENT)
Dept: PHYSICAL THERAPY | Facility: CLINIC | Age: 48
End: 2023-12-07
Payer: COMMERCIAL

## 2023-12-07 ENCOUNTER — OFFICE VISIT (OUTPATIENT)
Dept: PAIN MEDICINE | Facility: CLINIC | Age: 48
End: 2023-12-07
Payer: COMMERCIAL

## 2023-12-07 VITALS
DIASTOLIC BLOOD PRESSURE: 79 MMHG | BODY MASS INDEX: 32.19 KG/M2 | HEART RATE: 88 BPM | SYSTOLIC BLOOD PRESSURE: 112 MMHG | WEIGHT: 176 LBS | TEMPERATURE: 98.2 F

## 2023-12-07 DIAGNOSIS — M43.6 NECK STIFFNESS: ICD-10-CM

## 2023-12-07 DIAGNOSIS — M79.18 CERVICAL MYOFASCIAL PAIN SYNDROME: ICD-10-CM

## 2023-12-07 DIAGNOSIS — M54.12 CERVICAL RADICULOPATHY: ICD-10-CM

## 2023-12-07 DIAGNOSIS — R42 VERTIGO: Primary | ICD-10-CM

## 2023-12-07 DIAGNOSIS — R42 VERTIGO: ICD-10-CM

## 2023-12-07 DIAGNOSIS — Z98.84 S/P LAPAROSCOPIC SLEEVE GASTRECTOMY: ICD-10-CM

## 2023-12-07 DIAGNOSIS — M47.812 CERVICAL SPONDYLOSIS: Primary | ICD-10-CM

## 2023-12-07 DIAGNOSIS — G44.229 CHRONIC TENSION-TYPE HEADACHE, NOT INTRACTABLE: ICD-10-CM

## 2023-12-07 PROCEDURE — 97161 PT EVAL LOW COMPLEX 20 MIN: CPT | Performed by: PHYSICAL THERAPIST

## 2023-12-07 PROCEDURE — 99204 OFFICE O/P NEW MOD 45 MIN: CPT | Performed by: STUDENT IN AN ORGANIZED HEALTH CARE EDUCATION/TRAINING PROGRAM

## 2023-12-07 RX ORDER — METHOCARBAMOL 750 MG/1
750 TABLET, FILM COATED ORAL EVERY 6 HOURS PRN
Qty: 60 TABLET | Refills: 0 | Status: SHIPPED | OUTPATIENT
Start: 2023-12-07 | End: 2024-01-06

## 2023-12-07 RX ORDER — CLONAZEPAM 0.5 MG/1
0.5 TABLET ORAL 2 TIMES DAILY
COMMUNITY

## 2023-12-07 NOTE — PROGRESS NOTES
PT Evaluation     Today's date: 2023  Patient name: Kassandra Romo  : 1975  MRN: [id number]  Referring provider: DIANA Woods  Dx:   Encounter Diagnosis     ICD-10-CM    1. Vertigo  R42 Ambulatory Referral to Physical Therapy      2. Neck stiffness  M43.6 Ambulatory Referral to Physical Therapy          Start Time: 1015  Stop Time: 1100  Total time in clinic (min): 45 minutes    Assessment  Assessment details: Kassandra Romo is a 57 y.o. female who presents with complaints of Vertigo  (primary encounter diagnosis)  Neck stiffness. No further referral appears necessary at this time based upon examination results. Patient is presenting with cervicogenic headaches leading to limitations with sitting, standing, walking, driving, sleeping, and working. Symptoms reportedly improved with cervical retraction and this was given as HEP. Prognosis is good given HEP compliance and PT 1-2x/wk. Positive prognostic indicators include positive attitude toward recovery. Please contact me if you have any questions or recommendations. Thank you for the opportunity to share in Kamran's care. Impairments: abnormal muscle tone, abnormal or restricted ROM, abnormal movement, lacks appropriate home exercise program, pain with function, poor posture  and poor body mechanics    Symptom irritability: moderateUnderstanding of Dx/Px/POC: good   Prognosis: good    Plan  Patient would benefit from: skilled physical therapy  Planned therapy interventions: joint mobilization, manual therapy, patient education, postural training, strengthening, stretching, therapeutic activities, therapeutic exercise, home exercise program, neuromuscular re-education, flexibility and functional ROM exercises  Frequency: 1-2x. Treatment plan discussed with: patient        Subjective Evaluation    History of Present Illness  Mechanism of injury: Patient reports that she started with a headache on 23.  Vision was blurry and lost balance causing her to fall. Went to ED and stroke was ruled out. Prior to that date she would occasionally get vertigo about 3x/year. Since then everyday has headaches that can change locations. Today presenting in suboccipital region. Patient denies dizziness, dsyphagia, dysarthria, diplopia, drop attacks, nystagmus, facial numbness, nor nausea. Does have twitch in left eye. Symptoms worse in the morning. Does use neck support (air plane pillow) to help sleep. Occasional tingling into left arm at night time and in the morning. Shaking it does help (unable to specify where on hand). Left and right sided neck pain. No provocation of symptoms when rolling over in bed. Difficulty with driving as well as turning her head. Also reports depth perception is way off. Not a recurrent problem   Quality of life: good    Patient Goals  Patient goals for therapy: decreased pain, increased motion, improved balance and increased strength    Pain  Current pain ratin  At worst pain ratin  Quality: throbbing  Exacerbated by: sitting, walking, bending, lifting, driving. Progression: no change    Treatments  Previous treatment: chiropractic (with manipulation)    Short Term:  Pt will report decreased levels of pain by at least 2 subjective ratings in 4 weeks  Pt will demonstrate improved ROM by at least 10 degrees in 4 weeks  Pt will demonstrate improved strength by 1/2 grade in 4 weeks. Pt will be able to return to driving without limitations in 4 weeks  Long Term:   Pt will be independent in their HEP in 8 weeks  Pt will be pain free with IADL's in 8 weeks. Pt will demonstrate full cervical AROM in 8 weeks. Pt will be able to return to work without limitations in 8 weeks       Objective    Cervical % of normal   Flex. 50p! Extn.  50 dizziness   SB Left 75   SB Right 50   ROT Left 75   ROT Right 100   Repetitive testing: retraction= better    Flexion= worse  Protrusion- increases headache             MMT Shoulder       R       L   Flex. 5 5   Extn. 5 5   Abd. 5 5   Add. IR. 5 5   ER. 5 5                MMT    Elbow       R       L   Flex. 5 5   Extn. 5 5              MMT    Wrist       R         L   Flex. 5 5   Extn. 5 5    bent 5 5    arm straight 5 5     Head positioning:   forward head posture, postural correction improves symptoms  Palpation:  C2 left   Scapular positioning:  Scapular Assistance Test:  Sensation (pinprick L/R):   intact C4- T! Patel’s sign=    -              ULTT’s:  deferred    Vertebral artery test=  - sharp eliot test=   - supine transverse ligament=  - Alar ligament= -  Spurling’s= -    Headaches =  +     cervicogenic dizziness test on stool= + to left      Cervical joint mobility: decreased upper cervical      Insurance:  AMA/CMS Eval/ Re-eval POC expires Jose Arch #/ Referral # Total    Start date  Expiration date Extension  Visit limitation? PT only or  PT+OT? Co-Insurance   CMS 12.7 2.1.24                              AUTH #:  Date               Authed: Used                Remaining                    Precautions: vertigo, standard precautions  Patient provided verbal consent to treatment plan and recommended interventions.     Manuals 12.7            Visit  1                                                   Neuro Re-Ed                                                                                                        Ther Ex             Cerv retraction 3*10            Retraction with OP                                                                                           Ther Activity             Pt edu FB                         Gait Training                                       Modalities

## 2023-12-07 NOTE — PROGRESS NOTES
Assessment:  1. Cervical spondylosis    2. Cervical myofascial pain syndrome    3. Cervical radiculopathy    4. Neck stiffness    5. Vertigo    6. S/P laparoscopic sleeve gastrectomy    7. Chronic tension-type headache, not intractable      Patient is a pleasant 80-year-old woman who presents as a new patient consult for dizziness, neck pain and headaches that been going on for the past 3 months. Patient works as a supervisor at [employer]. The pain started after an injury and over the past month the intensity pains been moderate to severe. She rates the pain currently as a 5 out of 10 on numeric rating scale. The pain is occurring intermittently, 30-60% of the time. During the past month the symptoms have been worse throughout the day and she describes the pain as shooting, numbing, sharp, cutting, pins-and-needles, pressure-like, throbbing, dull and aching with some associated weakness in the upper extremities. Patient symptoms are mainly in the bilateral neck radiating up into her head behind her eyes but she also has left-sided radicular symptoms with pins-and-needles in her arm going down to her forearm. Activities that increase her pain include pressure, lying down, standing, bending, sitting, walking, exercise. Patient does have recent imaging of her cervical spine from last month which did demonstrate multilevel cervical disc disease with disc herniations at multiple levels causing mild central canal stenosis along with cervical spondylosis at multilevels as well. Past medical history significant for anxiety, asthma, depression, hypertension. Prior pain treatments include physical therapy which provided no relief, heat and ice which provides moderate relief and TENS unit which provides moderate relief. Prior pain medications include meloxicam which did not help, currently on Topamax in the past has trialed nortriptyline and steroid tapers neither are helping.   MRI of cervical spine from 11/9/2023 demonstrates mild disc bulge at multiple levels including C3-C4, C4-C5 with disc ridge complex identified at C5-C6, C6/C7 and multilevel degenerative disc disease in her cervical spine. Patient states she was seen by ENT and noted to have a sinus cyst that could be contributing to her symptoms as well. She did not have records at the initial appointment. She is scheduled to follow-up with them in early January. On exam patient with tenderness to palpation of bilateral cervical paraspinal muscles with taut bands appreciated positive jump sign. Additionally patient with limited range of motion of the cervical spine with extension. Patient is neck pain with radiation into skull causing headaches likely secondary to cervical disc disease at multiple levels, most pronounced at C3-C5. Additionally patient does have sinus cyst with could be contributing to her symptoms as well. On exam patient also with myofascial tenderness in the cervical spine. Given this, patient counseled to continue with Topamax as prescribed by her neurologist.  Additionally will prescribe Robaxin 750 mg every 6 hours as needed as Flexeril currently makes her too drowsy to take during the day. Lastly for patient's myofascial pain will schedule for trigger point injection in the bilateral cervical paraspinal muscles. Patient counseled risk and benefits of this injection and would like to proceed. Lastly, once patient meets with ENT in early January and has a plan for future therapy if no contraindications can consider cervical medial branch pathway targeting C3-C5 bilaterally. Patient counseled to continue with home exercise program and physical therapy.   Plan:  Continue PT and HEP focusing on balance and neck pain  Schedule for TPI of bilateral cervical paraspinal muscles  Start robaxin 750 mg q6hr prn   Continue topamax titration as prescribed by Neurology  Follow up after ENT appointment in January   No orders of the defined types were placed in this encounter. New Medications Ordered This Visit   Medications   • clonazePAM (KlonoPIN) 0.5 mg tablet     Sig: Take 0.5 mg by mouth 2 (two) times a day   • methocarbamol (Robaxin-750) 750 mg tablet     Sig: Take 1 tablet (750 mg total) by mouth every 6 (six) hours as needed for muscle spasms     Dispense:  60 tablet     Refill:  0       My impressions and treatment recommendations were discussed in detail with the patient, who verbalized understanding and had no further questions. Complete risks and benefits including bleeding, infection, tissue reaction, nerve injury and allergic reaction were discussed. The approach was demonstrated using models and literature was provided. Verbal and written consent was obtained. Follow-up is planned in six weeks time or sooner as warranted. Discharge instructions were provided. I personally saw and examined the patient and I agree with the above discussed plan of care. History of Present Illness:    Carmencita Koyanagi is a 57 y.o. female who presents to [address] [place] [address] [facility] for initial evaluation of the above stated pain complaints. The patient has a past medical and chronic pain history as outlined in the assessment section. She was referred by Jefefrson Mehta MD  [address] [place] [address]. [address]. Patient is a pleasant 53-year-old woman who presents as a new patient consult for dizziness, neck pain and headaches that been going on for the past 3 months. Patient works as a supervisor at [employer]. The pain started after an injury and over the past month the intensity pains been moderate to severe. She rates the pain currently as a 5 out of 10 on numeric rating scale. The pain is occurring intermittently, 30-60% of the time.   During the past month the symptoms have been worse throughout the day and she describes the pain as shooting, numbing, sharp, cutting, pins-and-needles, pressure-like, throbbing, dull and aching with some associated weakness in the upper extremities. Patient symptoms are mainly in the bilateral neck radiating up into her head behind her eyes but she also has left-sided radicular symptoms with pins-and-needles in her arm going down to her forearm. Activities that increase her pain include pressure, lying down, standing, bending, sitting, walking, exercise. Patient does have recent imaging of her cervical spine from last month which did demonstrate multilevel cervical disc disease with disc herniations at multiple levels causing mild central canal stenosis along with cervical spondylosis at multilevels as well. Past medical history significant for anxiety, asthma, depression, hypertension. Prior pain treatments include physical therapy which provided no relief, heat and ice which provides moderate relief and TENS unit which provides moderate relief. Prior pain medications include meloxicam which did not help, currently on Topamax in the past has trialed nortriptyline and steroid tapers neither are helping. Review of Systems:    Review of Systems   Constitutional:  Negative for chills and fatigue. HENT:  Negative for ear pain, mouth sores and sinus pressure. Eyes:  Positive for visual disturbance. Negative for redness. Eye pain: blurry vistion. Respiratory:  Negative for shortness of breath and wheezing. Cardiovascular:  Negative for chest pain and palpitations. Gastrointestinal:  Negative for abdominal pain and nausea. Endocrine: Negative for polyphagia. Musculoskeletal:  Positive for neck pain. Negative for arthralgias and back pain. Decreased ROM, muscle pain and joint stiffness  in the neck that travels up to the head   Skin:  Negative for wound. Neurological:  Positive for dizziness, weakness, light-headedness, numbness (arm) and headaches. Negative for seizures. Psychiatric/Behavioral:  Positive for dysphoric mood and sleep disturbance.  The patient is nervous/anxious. Past Medical History:   Diagnosis Date   • Acid reflux    • Asthma    • Baker cyst, left    • Constipation    • Cough    • CPAP (continuous positive airway pressure) dependence    • Digestive disorder    • Headache    • History of mammogram 07/17/2018   • Increased urinary frequency    • Joint pain    • Obesity (BMI 30-39. 9)    • Postgastrectomy malabsorption    • Seasonal allergies    • Shortness of breath    • Sleep apnea    • Vertigo        Past Surgical History:   Procedure Laterality Date   • CARPAL TUNNEL RELEASE     • EGD     • ENDOMETRIAL ABLATION W/ NOVASURE  2014    Laparscopy, D&C, Novasure ablation   • FOOT SURGERY     • HYSTERECTOMY  12/20/2016    B/L salpingectomy   • KNEE SURGERY     • PARTIAL HYSTERECTOMY     • GA LAPS [address] RSTRICTIV PX LONGITUDINAL GASTRECTOMY N/A 8/4/2020    Procedure: LAPAROSCOPIC SLEEVE  GASTRECTOMY;  Surgeon: Rakesh Ruiz MD;  Location: [address] [place] [address];  Service: Bariatrics   • TENDON REPAIR  2016   • TUBAL LIGATION  08/06/2010    Laparoscopy tubal cautery       Family History   Problem Relation Age of Onset   • No Known Problems Mother    • Canavan disease Father    • Cancer Father    • Breast cancer Maternal Grandmother    • Breast cancer Paternal Grandmother        Social History     Occupational History   • Occupation: Unemployed    Tobacco Use   • Smoking status: Never   • Smokeless tobacco: Never   Vaping Use   • Vaping Use: Never used   Substance and Sexual Activity   • Alcohol use: Yes     Comment: 0cc   • Drug use: No   • Sexual activity: Yes     Partners: Male     Birth control/protection: Female Sterilization         Current Outpatient Medications:   •  azelastine (ASTELIN) 0.1 % nasal spray, 1 spray into each nostril 2 (two) times a day Use in each nostril as directed, Disp: 1 mL, Rfl: 2  •  Calcium-Vitamin D-Vitamin K (Viactiv Calcium Plus D) 650-12.5-40 MG-MCG-MCG CHEW, Chew 1 tablet 3 (three) times a day, Disp: , Rfl:   •  clonazePAM (KlonoPIN) 0.5 mg tablet, Take 0.5 mg by mouth 2 (two) times a day as needed for anxiety , Disp: , Rfl:   •  clonazePAM (KlonoPIN) 0.5 mg tablet, Take 0.5 mg by mouth 2 (two) times a day, Disp: , Rfl:   •  fluticasone (FLONASE) 50 mcg/act nasal spray, 1 spray into each nostril 2 (two) times a day, Disp: 1 g, Rfl: 6  •  losartan-hydrochlorothiazide (HYZAAR) 50-12.5 mg per tablet, Take 1 tablet by mouth daily, Disp: , Rfl:   •  methocarbamol (Robaxin-750) 750 mg tablet, Take 1 tablet (750 mg total) by mouth every 6 (six) hours as needed for muscle spasms, Disp: 60 tablet, Rfl: 0  •  Multiple Vitamin (multivitamin) tablet, Take 1 tablet by mouth daily Bariatric, Disp: , Rfl:   •  topiramate (TOPAMAX) 25 mg sprinkle capsule, Take 25 mg by mouth 2 (two) times a day, Disp: , Rfl:   •  azithromycin (ZITHROMAX) 250 mg tablet, , Disp: , Rfl:   •  diclofenac (VOLTAREN) 75 mg EC tablet, Take 75 mg by mouth 2 (two) times a day (Patient not taking: Reported on 9/21/2021), Disp: , Rfl:   •  Diclofenac Sodium (VOLTAREN) 1 %, Apply 1 application topically 4 (four) times a day, Disp: , Rfl:   •  fluticasone-salmeterol (ADVAIR) 250-50 mcg/dose inhaler, Inhale 1 puff every 12 (twelve) hours (Patient not taking: Reported on 9/25/2023), Disp: , Rfl:   •  meclizine (ANTIVERT) 25 mg tablet, Take 25 mg by mouth 2 (two) times a day as needed, Disp: , Rfl:   •  methylPREDNISolone 4 MG tablet therapy pack, TAKE 6 TABLETS ON DAY 1 AS DIRECTED ON PACKAGE AND DECREASE BY 1 TAB EACH DAY FOR A TOTAL OF 6 DAYS (Patient not taking: Reported on 9/21/2021), Disp: , Rfl:   •  nortriptyline (PAMELOR) 10 mg capsule, , Disp: , Rfl:   •  nortriptyline (PAMELOR) 25 mg capsule, TAKE 1 CAPSULE BY ORAL ROUTE BEFORE BED DAILY. MONITOR FOR MOOD EFFECTS.  (Patient not taking: Reported on 11/29/2023), Disp: , Rfl:   •  predniSONE 5 mg tablet, Take 6 tablets Day 1, then take 5 tablets Day 2, then take 4 tablets Day 3, then take 3 tablets on Day 4, then take 2 tablets on Day 5, then take 1 tablet Day 1. (Patient not taking: No sig reported), Disp: , Rfl:   •  pseudoephedrine-guaifenesin (MUCINEX D)  MG per tablet, Take 1 tablet by mouth if needed, Disp: , Rfl:   •  valACYclovir (VALTREX) 500 mg tablet, TAKE 1 TABLET BY MOUTH EVERY DAY, Disp: 30 tablet, Rfl: 0    Allergies   Allergen Reactions   • Meloxicam Nausea Only   • Seasonal Ic [Cholestatin] Cough     Seasonal Allergies         Physical Exam:    /79   Pulse 88   Temp 98.2 °F (36.8 °C)   Wt 79.8 kg (176 lb)   BMI 32.19 kg/m²     Constitutional: normal, well developed, well nourished, alert, in no distress and non-toxic and no overt pain behavior. Eyes: anicteric  HEENT: grossly intact  Neck: supple, symmetric, trachea midline and no masses   Pulmonary:even and unlabored  Cardiovascular:No edema or pitting edema present  Skin:Normal without rashes or lesions and well hydrated  Psychiatric:Mood and affect appropriate  Neurologic:Cranial Nerves II-XII grossly intact  Musculoskeletal:normal gait. TTP in bilateral cervical paraspinal muscles with taut bands and positive Jump sign. Pain with extension of the cervical spine. Imaging  MRI Cervical spine 12/7/2023     Media Information      Document Information    Forms   MRI Cervical Spine   12/07/2023   Attached To: Office Visit on 12/7/23 with Eduardo Rodríguez MD   Source Information    Rach Villasenor  [facility]     No orders to display       No orders of the defined types were placed in this encounter.

## 2023-12-12 ENCOUNTER — APPOINTMENT (OUTPATIENT)
Dept: PHYSICAL THERAPY | Facility: CLINIC | Age: 48
End: 2023-12-12
Payer: COMMERCIAL

## 2023-12-14 ENCOUNTER — PROCEDURE VISIT (OUTPATIENT)
Dept: PAIN MEDICINE | Facility: CLINIC | Age: 48
End: 2023-12-14
Payer: COMMERCIAL

## 2023-12-14 VITALS — SYSTOLIC BLOOD PRESSURE: 122 MMHG | DIASTOLIC BLOOD PRESSURE: 80 MMHG | HEART RATE: 86 BPM

## 2023-12-14 DIAGNOSIS — M79.18 CERVICAL MYOFASCIAL PAIN SYNDROME: Primary | ICD-10-CM

## 2023-12-14 PROCEDURE — 20552 NJX 1/MLT TRIGGER POINT 1/2: CPT | Performed by: ORTHOPAEDIC SURGERY

## 2023-12-14 RX ORDER — METHYLPREDNISOLONE ACETATE 40 MG/ML
40 INJECTION, SUSPENSION INTRA-ARTICULAR; INTRALESIONAL; INTRAMUSCULAR; SOFT TISSUE ONCE
Status: COMPLETED | OUTPATIENT
Start: 2023-12-14 | End: 2023-12-14

## 2023-12-14 RX ORDER — LIDOCAINE HYDROCHLORIDE 10 MG/ML
10 INJECTION, SOLUTION INFILTRATION; PERINEURAL ONCE
Status: COMPLETED | OUTPATIENT
Start: 2023-12-14 | End: 2023-12-14

## 2023-12-14 RX ORDER — METHYLPREDNISOLONE ACETATE 40 MG/ML
40 INJECTION, SUSPENSION INTRA-ARTICULAR; INTRALESIONAL; INTRAMUSCULAR; SOFT TISSUE ONCE
Status: CANCELLED | OUTPATIENT
Start: 2023-12-14 | End: 2023-12-14

## 2023-12-14 RX ORDER — LIDOCAINE HYDROCHLORIDE 10 MG/ML
INJECTION, SOLUTION INFILTRATION; PERINEURAL ONCE
Status: CANCELLED | OUTPATIENT
Start: 2023-12-14 | End: 2023-12-14

## 2023-12-14 RX ADMIN — LIDOCAINE HYDROCHLORIDE 10 ML: 10 INJECTION, SOLUTION INFILTRATION; PERINEURAL at 10:15

## 2023-12-14 RX ADMIN — METHYLPREDNISOLONE ACETATE 40 MG: 40 INJECTION, SUSPENSION INTRA-ARTICULAR; INTRALESIONAL; INTRAMUSCULAR; SOFT TISSUE at 10:35

## 2023-12-14 RX ADMIN — LIDOCAINE HYDROCHLORIDE 10 ML: 10 INJECTION, SOLUTION INFILTRATION; PERINEURAL at 10:34

## 2023-12-14 RX ADMIN — METHYLPREDNISOLONE ACETATE 40 MG: 40 INJECTION, SUSPENSION INTRA-ARTICULAR; INTRALESIONAL; INTRAMUSCULAR; SOFT TISSUE at 10:15

## 2023-12-14 NOTE — PROGRESS NOTES
Universal Protocol:  Consent: Verbal consent obtained. Risks and benefits: risks, benefits and alternatives were discussed  Consent given by: patient  Patient understanding: patient states understanding of the procedure being performed  Patient consent: the patient's understanding of the procedure matches consent given  Procedure consent: procedure consent matches procedure scheduled  Relevant documents: relevant documents present and verified  Test results: test results not available  Site marked: the operative site was not marked  Radiology Images displayed and confirmed.  If images not available, report reviewed: imaging studies not available  Patient identity confirmed: verbally with patient  Supporting Documentation  Indications: pain   Trigger Point Injections: single/multiple trigger point(s): 1-2 muscle groups    Injection site identified by: palpation  Procedure Details  Location(s):    Neck/Upper Back: L occipital ridge, R occipital ridge, L cervical paraspinals and R cervical paraspinals   Needle size: 25 G  Medications: 10 mL lidocaine (XYLOCAINE) injection 1 %; 40 mg methylPREDNISolone acetate (DEPO-MEDROL) injection 40 mg/mL

## 2023-12-15 ENCOUNTER — OFFICE VISIT (OUTPATIENT)
Dept: PHYSICAL THERAPY | Facility: CLINIC | Age: 48
End: 2023-12-15
Payer: COMMERCIAL

## 2023-12-15 DIAGNOSIS — M43.6 NECK STIFFNESS: ICD-10-CM

## 2023-12-15 DIAGNOSIS — R42 VERTIGO: Primary | ICD-10-CM

## 2023-12-15 PROCEDURE — 97110 THERAPEUTIC EXERCISES: CPT | Performed by: PHYSICAL THERAPIST

## 2023-12-20 ENCOUNTER — OFFICE VISIT (OUTPATIENT)
Dept: URGENT CARE | Facility: CLINIC | Age: 48
End: 2023-12-20

## 2023-12-20 VITALS
DIASTOLIC BLOOD PRESSURE: 67 MMHG | WEIGHT: 173.4 LBS | BODY MASS INDEX: 31.72 KG/M2 | RESPIRATION RATE: 18 BRPM | SYSTOLIC BLOOD PRESSURE: 130 MMHG | OXYGEN SATURATION: 99 % | TEMPERATURE: 96.8 F | HEART RATE: 91 BPM

## 2023-12-20 DIAGNOSIS — L23.9 ALLERGIC CONTACT DERMATITIS, UNSPECIFIED TRIGGER: Primary | ICD-10-CM

## 2023-12-20 PROBLEM — E66.01 CLASS 3 SEVERE OBESITY IN ADULT (HCC): Status: RESOLVED | Noted: 2020-08-04 | Resolved: 2023-12-20

## 2023-12-20 PROBLEM — M77.8 TENDINITIS OF RIGHT HAND: Status: ACTIVE | Noted: 2022-10-21

## 2023-12-20 PROBLEM — I10 PRIMARY HYPERTENSION: Status: ACTIVE | Noted: 2023-10-09

## 2023-12-20 PROBLEM — G47.33 MODERATE OBSTRUCTIVE SLEEP APNEA: Status: ACTIVE | Noted: 2019-11-27

## 2023-12-20 PROBLEM — J30.2 CHRONIC SEASONAL ALLERGIC RHINITIS: Status: ACTIVE | Noted: 2018-04-10

## 2023-12-20 PROBLEM — S83.001A SUBLUXATION OF RIGHT PATELLA: Status: ACTIVE | Noted: 2021-03-23

## 2023-12-20 PROBLEM — E66.813 CLASS 3 SEVERE OBESITY IN ADULT (HCC): Status: RESOLVED | Noted: 2020-08-04 | Resolved: 2023-12-20

## 2023-12-20 PROBLEM — Z98.890 S/P RIGHT KNEE ARTHROSCOPY: Status: ACTIVE | Noted: 2021-04-06

## 2023-12-20 PROBLEM — J45.40 MODERATE PERSISTENT ASTHMA WITHOUT COMPLICATION: Status: ACTIVE | Noted: 2018-04-10

## 2023-12-20 PROBLEM — M17.11 PRIMARY OSTEOARTHRITIS OF RIGHT KNEE: Status: ACTIVE | Noted: 2020-12-09

## 2023-12-20 RX ORDER — TRIAMCINOLONE ACETONIDE 0.25 MG/G
CREAM TOPICAL 2 TIMES DAILY
Qty: 30 G | Refills: 0 | Status: SHIPPED | OUTPATIENT
Start: 2023-12-20

## 2023-12-20 RX ORDER — TRIAMCINOLONE ACETONIDE 0.25 MG/G
CREAM TOPICAL 2 TIMES DAILY
Qty: 30 G | Refills: 0 | Status: SHIPPED | OUTPATIENT
Start: 2023-12-20 | End: 2023-12-20

## 2023-12-20 NOTE — PROGRESS NOTES
[facility]    NAME: Kamran Rees is a 48 y.o. female  : 1975    MRN: [id number]  DATE: 2023  TIME: 5:59 PM    Assessment and Plan   Allergic contact dermatitis, unspecified trigger [L23.9]  1. Allergic contact dermatitis, unspecified trigger  triamcinolone (KENALOG) 0.025 % cream    DISCONTINUED: triamcinolone (KENALOG) 0.025 % cream        Suspected allergic dermatitis related to topramax. Contact ordering provider again to discuss if need to taper off/switch to other medications. Do not stop abruptly.   Placed on topical creams.   Started on steroids for symptoms - complete entire course and do not stop or your rash may come back.  Continue supportive measures such as calamine lotion or oatmeal baths to the area.  Follow up with PCP if not improving.     Patient Instructions     Take all tablets of prednisone at the same time for each day, as prescribed. Recommend taking in the morning, with food. If you are also taking an anti-inflammatory such as Aleve or ibuprofen, recommend stopping or only 200 mg while you are on higher doses of prednisone (40-60mg).     May use Benadryl at night (or for less drowsiness Claritin) as needed for itching. Be careful of drowsiness especially with Benadryl; do not take before driving or operating heavy machinery.     May use cool compresses, oatmeal baths, calamine for poison ivy and emollients such as Aveeno oatmeal for eczema as needed for comfort.     For poison ivy can use Tecnu cream (OTC) to wash after potential contact with poison ivy.     Try to use unscented/sensitive formula soaps, lotions, and detergents. No new medications. Limit hot showers.     Follow up with PCP in 5-7 days if symptoms are not improving or sooner with fever, thick yellow pus drainage, or any other concern for infection.      Chief Complaint     Chief Complaint   Patient presents with    Rash     Rash on hands bilaterally, itching wakes her up at night. Not located  anywhere else on her body.  Started new medication x 5 weeks ago, at 100mg dose now.  Pt state 25, 50 no itching but 75 and 100 itching started.          History of Present Illness       Presents with rash to the bilateral hands. Noted that she does have a recent change increasing of medication and itching was not present at lower 25-50 mg dosing but started when she increased to the  mg dosing of Topamax. Rash is keeping her up at night. Denies location elsewhere on her body. Reports it has been 4 weeks since starting the medication and rash is still present. She did call ordering provider who initially told her ot wait 4 weeks but still has not improved. She called again this morning but has not received a call back. Tried multiple OTC products and benadryl without relief. Reports that headaches and dizziness are improved but only getting 2 hours of sleep due to itching. Denies known contact with rash. No new soaps, lotions, detergents.         Review of Systems   Review of Systems   Constitutional:  Negative for fever.   HENT:  Negative for congestion.    Respiratory:  Negative for cough and shortness of breath.    Cardiovascular:  Negative for chest pain.   Skin:  Positive for rash.   Neurological:  Negative for weakness.   Hematological:  Does not bruise/bleed easily.   Psychiatric/Behavioral:  Negative for confusion.          Current Medications       Current Outpatient Medications:     azelastine (ASTELIN) 0.1 % nasal spray, 1 spray into each nostril 2 (two) times a day Use in each nostril as directed, Disp: 1 mL, Rfl: 2    Calcium-Vitamin D-Vitamin K (Viactiv Calcium Plus D) 650-12.5-40 MG-MCG-MCG CHEW, Chew 1 tablet 3 (three) times a day, Disp: , Rfl:     clonazePAM (KlonoPIN) 0.5 mg tablet, Take 0.5 mg by mouth 2 (two) times a day as needed for anxiety , Disp: , Rfl:     fluticasone (FLONASE) 50 mcg/act nasal spray, 1 spray into each nostril 2 (two) times a day, Disp: 1 g, Rfl: 6     losartan-hydrochlorothiazide (HYZAAR) 50-12.5 mg per tablet, Take 1 tablet by mouth daily, Disp: , Rfl:     methocarbamol (Robaxin-750) 750 mg tablet, Take 1 tablet (750 mg total) by mouth every 6 (six) hours as needed for muscle spasms, Disp: 60 tablet, Rfl: 0    Multiple Vitamin (multivitamin) tablet, Take 1 tablet by mouth daily Bariatric, Disp: , Rfl:     topiramate (TOPAMAX) 25 mg sprinkle capsule, Take 100 mg by mouth 2 (two) times a day, Disp: , Rfl:     triamcinolone (KENALOG) 0.025 % cream, Apply topically 2 (two) times a day, Disp: 30 g, Rfl: 0    azithromycin (ZITHROMAX) 250 mg tablet, , Disp: , Rfl:     clonazePAM (KlonoPIN) 0.5 mg tablet, Take 0.5 mg by mouth 2 (two) times a day, Disp: , Rfl:     diclofenac (VOLTAREN) 75 mg EC tablet, Take 75 mg by mouth 2 (two) times a day (Patient not taking: Reported on 9/21/2021), Disp: , Rfl:     Diclofenac Sodium (VOLTAREN) 1 %, Apply 1 application topically 4 (four) times a day, Disp: , Rfl:     fluticasone-salmeterol (ADVAIR) 250-50 mcg/dose inhaler, Inhale 1 puff every 12 (twelve) hours (Patient not taking: Reported on 9/25/2023), Disp: , Rfl:     meclizine (ANTIVERT) 25 mg tablet, Take 25 mg by mouth 2 (two) times a day as needed, Disp: , Rfl:     methylPREDNISolone 4 MG tablet therapy pack, TAKE 6 TABLETS ON DAY 1 AS DIRECTED ON PACKAGE AND DECREASE BY 1 TAB EACH DAY FOR A TOTAL OF 6 DAYS (Patient not taking: Reported on 9/21/2021), Disp: , Rfl:     nortriptyline (PAMELOR) 10 mg capsule, , Disp: , Rfl:     nortriptyline (PAMELOR) 25 mg capsule, TAKE 1 CAPSULE BY ORAL ROUTE BEFORE BED DAILY. MONITOR FOR MOOD EFFECTS. (Patient not taking: Reported on 11/29/2023), Disp: , Rfl:     predniSONE 5 mg tablet, Take 6 tablets Day 1, then take 5 tablets Day 2, then take 4 tablets Day 3, then take 3 tablets on Day 4, then take 2 tablets on Day 5, then take 1 tablet Day 1. (Patient not taking: No sig reported), Disp: , Rfl:     pseudoephedrine-guaifenesin (MUCINEX D)   MG per tablet, Take 1 tablet by mouth if needed, Disp: , Rfl:     valACYclovir (VALTREX) 500 mg tablet, TAKE 1 TABLET BY MOUTH EVERY DAY, Disp: 30 tablet, Rfl: 0    Current Allergies     Allergies as of 12/20/2023 - Reviewed 12/20/2023   Allergen Reaction Noted    Cholestyramine Other (See Comments) 09/03/2013    Meloxicam Nausea Only 08/04/2020    Seasonal ic [cholestatin] Cough 09/03/2013            The following portions of the patient's history were reviewed and updated as appropriate: allergies, current medications, past family history, past medical history, past social history, past surgical history and problem list.     Past Medical History:   Diagnosis Date    Acid reflux     Asthma     Baker cyst, left     Constipation     Cough     CPAP (continuous positive airway pressure) dependence     Digestive disorder     Headache     History of mammogram 07/17/2018    Increased urinary frequency     Joint pain     Obesity (BMI 30-39.9)     Postgastrectomy malabsorption     Seasonal allergies     Shortness of breath     Sleep apnea     Vertigo        Past Surgical History:   Procedure Laterality Date    CARPAL TUNNEL RELEASE      EGD      ENDOMETRIAL ABLATION W/ NOVASURE  2014    Laparscopy, D&C, Novasure ablation    FOOT SURGERY      HYSTERECTOMY  12/20/2016    B/L salpingectomy    KNEE SURGERY      PARTIAL HYSTERECTOMY      PA LAPS GSTRC RSTRICTIV PX LONGITUDINAL GASTRECTOMY N/A 8/4/2020    Procedure: LAPAROSCOPIC SLEEVE  GASTRECTOMY;  Surgeon: Gerson Engel MD;  Location: [facility];  Service: Bariatrics    TENDON REPAIR  2016    TUBAL LIGATION  08/06/2010    Laparoscopy tubal cautery       Family History   Problem Relation Age of Onset    No Known Problems Mother     Canavan disease Father     Cancer Father     Breast cancer Maternal Grandmother     Breast cancer Paternal Grandmother          Medications have been verified.        Objective   /67 (BP Location: Left arm)   Pulse 91   Temp (!)  96.8 °F (36 °C)   Resp 18   Wt 78.7 kg (173 lb 6.4 oz)   SpO2 99%   BMI 31.72 kg/m²        Physical Exam     Physical Exam  Vitals reviewed.   Constitutional:       Appearance: Normal appearance.   Cardiovascular:      Rate and Rhythm: Normal rate and regular rhythm.      Pulses: Normal pulses.      Heart sounds: Normal heart sounds. No murmur heard.  Pulmonary:      Effort: Pulmonary effort is normal. No respiratory distress.      Breath sounds: Normal breath sounds.   Skin:     General: Skin is warm and dry.      Capillary Refill: Capillary refill takes less than 2 seconds.      Comments: Bilateral dorsal palm with blotchy erythematous rash present. Does not go to palmar surface of hands. No burrow holes.    Neurological:      General: No focal deficit present.      Mental Status: She is alert and oriented to person, place, and time.   Psychiatric:         Mood and Affect: Mood normal.         Behavior: Behavior normal.

## 2023-12-21 ENCOUNTER — TELEPHONE (OUTPATIENT)
Dept: PAIN MEDICINE | Facility: CLINIC | Age: 48
End: 2023-12-21

## 2023-12-21 NOTE — TELEPHONE ENCOUNTER
Patient reports minimal improvement post inj  Pain level 3/10    Patient's neck seems to feel better, ROM is better and can turn neck more. Is still sore at the top of patient's neck and is still having headaches like she has been having.    Patient also goes to physical therapy and currently she has an extremely high copay and when the new year begins the cost will only go up for her. She stated she can continue to come until end of the month but after that will have to stop.    Patient's rash on hands has been extremely bad, she contacted her doctor for a change in medication and is awaiting a response, but she went to the urgent care last night for relief, she was provided a topical cream that has helped improve her symptoms.     Is on waitlist to be seen by ENT

## 2023-12-27 ENCOUNTER — TELEPHONE (OUTPATIENT)
Age: 48
End: 2023-12-27

## 2023-12-27 NOTE — TELEPHONE ENCOUNTER
S/w the patient to inquire and she stated she only received minimal relief with the TPI's she had on 12/14. She stated that if she didn't receive enough relief then you had recommended a VANESSA? She stated she does have better ROM but the HA's continue and they are moving allover her head. Certain areas at certain times of the day. She stated the Topamax prescribed by her neurologist out of network does help but she started with a pruritic red raised rash on her hands that she stated she would itch in her sleep. She went to urgent care and they ordered Kenalog cream but it is a small bottle and she already used it up. But it is relieving the itch and rash. She has tried numerous times to contact her PCP and then her neurologist and both practioners are out of the office until after the new year. Chaitanya si wondering if you could order her a big tube of the cream to help with the rash. She also wanted you to be aware that she can no longer afford to go to PT in the new year because it will cost her $654 a session and she has a $78903.00 deductible. She bought the [facility] book online and will continue to do the exercises at home and she made a new consult appointment through [facility] to be established with them. She also requested us to fill out her disability paperwork because her neurologist from out of network is out of the office. Please review and advise. Thanks

## 2023-12-27 NOTE — TELEPHONE ENCOUNTER
Caller: clive    Doctor: diana    Reason for call: pt wants to know if we will fill out her disability paperwork? I let her know that usually the original dr that took her out needs to fill them out. She also wants to know if she can get another injection?    Call back#: [phone]

## 2024-01-02 NOTE — TELEPHONE ENCOUNTER
Caller: clive Andrew    Doctor: Frantz    Reason for call: pt called asking if the OV notes from 12/7 and procedure notes from 12/14 can be faxed to Dr. Evelyn Thomas at [facility]    Fax # [phone]    Call back#: [phone]

## 2024-01-16 ENCOUNTER — OFFICE VISIT (OUTPATIENT)
Dept: NEUROLOGY | Facility: CLINIC | Age: 49
End: 2024-01-16
Payer: COMMERCIAL

## 2024-01-16 ENCOUNTER — TELEPHONE (OUTPATIENT)
Age: 49
End: 2024-01-16

## 2024-01-16 VITALS
OXYGEN SATURATION: 99 % | DIASTOLIC BLOOD PRESSURE: 78 MMHG | BODY MASS INDEX: 32.02 KG/M2 | WEIGHT: 174 LBS | HEIGHT: 62 IN | HEART RATE: 96 BPM | RESPIRATION RATE: 14 BRPM | TEMPERATURE: 97.9 F | SYSTOLIC BLOOD PRESSURE: 123 MMHG

## 2024-01-16 DIAGNOSIS — G44.86 CERVICOGENIC HEADACHE: ICD-10-CM

## 2024-01-16 DIAGNOSIS — M54.81 BILATERAL OCCIPITAL NEURALGIA: Primary | ICD-10-CM

## 2024-01-16 PROCEDURE — 99205 OFFICE O/P NEW HI 60 MIN: CPT

## 2024-01-16 RX ORDER — BUDESONIDE AND FORMOTEROL FUMARATE DIHYDRATE 80; 4.5 UG/1; UG/1
AEROSOL RESPIRATORY (INHALATION)
COMMUNITY
Start: 2024-01-04

## 2024-01-16 NOTE — PROGRESS NOTES
Patient ID: Kamran Rees is a 48 y.o. female.    Assessment/Plan:    Bilateral occipital neuralgia  Kamran Rees is a 48 year old female with a hx of migraine headaches and associated vertigo with recent onset of neck pain, occipital pain and lightheadedness/imbalance and nausea. We discussed today it is my opinion that the incidental finding of left maxillary sinus retention cyst is unlikely to be causing her symptoms. It is more likely her symptoms are related to bilateral occipital neuralgia/cervicogenic headache related to degenerative cervical disc disease. Vestibular migraine also remains on the differential but less likely given the improvement she has experienced with trigger point injections and cervical spine physical therapy exercises. I discussed with the patient today, if she has been unable to tolerate Topiramate other options to consider include gabapentin or amitriptyline. She should also consider occipital nerve blocks which are likely to be very effective. However, given her plan to continue care at [facility] (as they are managing her disability claims which we discussed our office would not do- [facility] for functional capacity evaluation) I will defer any changes in management to their discretion. She will return to our office on an as needed basis.     Plan:  Exam and history consistent with bilateral occipital neuralgia and cervicogenic headache 2/2 cervical degenerative disc disease  I would recommend continuing care with pain management; continue trigger point injections; consider ROSEMARIE or occipital nerve blocks  I do not feel incidental finding of left maxillary sinus retention cyst is contributing to headaches or dizziness; feel free to see ENT for their opinion  I would recommend completing additional labs; ordered today: TSH, CRP/Sed rate, Vitamin B12, Vitamin D, Anti-microsomal ab  If you do not tolerate Topiramate; you could consider Gabapentin or  Amitriptyline as alternatives   I would recommend continuing a muscle relaxant such as robaxin as needed   Continue home physical therapy exercises  Follow up with your PCP, ENT, Pain management, and your primary Neurology team  Return to our office in the future as needed     Cervicogenic headache  Kamran Rees is a 48 year old female with a hx of migraine headaches and associated vertigo with recent onset of neck pain, occipital pain and lightheadedness/imbalance and nausea. We discussed today it is my opinion that the incidental finding of left maxillary sinus retention cyst is unlikely to be causing her symptoms. It is more likely her symptoms are related to bilateral occipital neuralgia/cervicogenic headache related to degenerative cervical disc disease. Vestibular migraine also remains on the differential but less likely given the improvement she has experienced with trigger point injections and cervical spine physical therapy exercises. I discussed with the patient today, if she has been unable to tolerate Topiramate other options to consider include gabapentin or amitriptyline. She should also consider occipital nerve blocks which are likely to be very effective. However, given her plan to continue care at [facility] (as they are managing her disability claims which we discussed our office would not do- would [facility] for [facility] evaluation) I will defer any changes in management to their discretion. She will return to our office on an as needed basis.     Plan:  Exam and history consistent with bilateral occipital neuralgia and cervicogenic headache 2/2 cervical degenerative disc disease  I would recommend continuing care with pain management; continue trigger point injections; consider ROSEMARIE or occipital nerve blocks  I do not feel incidental finding of left maxillary sinus retention cyst is contributing to headaches or dizziness; feel free to see ENT for their  opinion  I would recommend completing additional labs; ordered today: TSH, CRP/Sed rate, Vitamin B12, Vitamin D, Anti-microsomal ab  If you do not tolerate Topiramate; you could consider Gabapentin or Amitriptyline as alternatives   I would recommend continuing a muscle relaxant such as robaxin as needed   Continue home physical therapy exercises  Follow up with your PCP, ENT, Pain management, and your primary Neurology team  Return to our office in the future as needed        Diagnoses and all orders for this visit:    Bilateral occipital neuralgia  -     C-reactive protein; Future  -     Sedimentation rate, automated; Future  -     TSH, 3rd generation; Future  -     Vitamin B12; Future  -     Vitamin D 25 hydroxy; Future  -     Anti-microsomal antibody; Future    Cervicogenic headache  -     C-reactive protein; Future  -     Sedimentation rate, automated; Future  -     TSH, 3rd generation; Future  -     Vitamin B12; Future  -     Vitamin D 25 hydroxy; Future  -     Anti-microsomal antibody; Future       I have spent a total time of 75 minutes on 01/16/24 in caring for this patient including Diagnostic results, Prognosis, Risks and benefits of tx options, Instructions for management, Patient and family education, Importance of tx compliance, Risk factor reductions, Impressions, Documenting in the medical record, Reviewing / ordering tests, medicine, procedures  , and Obtaining or reviewing history  .      Subjective:    MEIR Rees is a 48 year old female with a hx of postsurgical malabsorption s/p gastric sleeve 8/2020, asthma, chronic allergic rhinitis, moderate JANETTE, hypertension, chronic fatigue, osteoarthritis of the right knee, plantar fibromatosis, obesity, s/p hysterectomy, vertigo, migraines, vitamin D deficiency, chronic headaches, neck stiffness/pain, and anxiety who presents to the office in consultation as a self referral for evaluation and treatment of head pain, dizziness, visual disturbance, and  memory disturbance.     Of note, she is currently under the care of [facility] last seen 12/3/2023, with pending follow up 2/14/2024.    She describes all symptoms began on 9/25/2023. She describes sitting at work watching a slide show and suddenly began to experience a headache with gradual onset. She took 2 Tylenol, and 1 hour later when she tried to read off of the projector everything was blurry and she could not read. She states she tried to get up from a chair and she fell over. She declined calling EMS but was picked up early from work and saw her PCP (in [place]) and was sent to the ER and was treated for a migraine with complete resolution of symptoms. Her CTH at that time was unremarkable. ER records 9/25/23 were reviewed in [facility] today. She states her headache with associated vertigo, dizziness, lightheadedness returned about 4 hours later and has been recurrent since that time. She did follow up with her PCP who prescribed ubrelvy and sumatriptan which she found to be ineffective. She describes a hx of vertigo and migraines in the past but feels her current headaches/symptoms are different.     She has since been evaluated by multiple specialists:  [facility] 11/3, 12/3, pending 2/14  ENT 11/29, pending follow up 1/24  Ophthalmology 11/8  Pain Management 12/7/23; pending follow up 1/24    She reports she has undergone trigger point injections and is planned to undergo cervical medial branch pathway block. She has recently done acupuncture which provided her some relief. She states Ophthalmology told her she has 20/20 vision and had a normal eye exam. She had been participating in physical therapy through [place]- was doing vestibular therapy but this was making her dizziness worse, she went to [facility]'s PT but this was cost prohibitive. She is now doing home PT exercises. She has undergone testing and has trialed nortriptyline with no benefit, currently taking topiramate  although tolerating this with difficulty.     MRI Cervical Spine wo 11/9/2023:       MRI Brain wo 11/9/23:  Normal MRI of the brain  Left maxillary sinus retention cyst    Labs:  AQUILES- negative  Lyme ab- negative   CMP and CBC within normal limits 9/25/23    Current HPI:  Currently she reports every day she wakes up with sharp, stabbing, throbbing pain bilateral occipital pain (L>R). This waxes and wanes throughout the day. This has been constant since 9/25/23. This was improved after trigger point injections by 25%. She complains of associated lightheadedness, dizziness, imbalance, and nausea. She states she can drive locally (2 miles) but cannot drive further when she has a headache due to the severity of her symptoms. She is currently out of work by her PCP and Neurologist. She goes back to Neurology 2/14/24 and is expecting to be released back to work but does not feel she is ready. She states she cannot bend, stand, lift without dizziness and falls. She also has a 40 minute commute one way to work that she does not feel she can tolerate. She also complains of blurred vision intermittently depending on activity. She states this occurs at least a couple of times per week. Sometimes she cannot read her text messages off her cell phone. She also describes worsening memory complaints. She states she is forgetting to complete tasks often. She c/o short term memory concerns. She recently forgot to turn off the stove. She feels overall her neck pain is improved at this time and denies any current neck pain.     Medication trials:  Nortriptyline helped headaches but caused palpitations, and insomnia  Topamax caused itchy hands/rash, peripheral tingling and insomnia; she is currently down to 50 mg (was on 100 mg) but would like to discontinue.  Ubrelvy and Sumatriptan- ineffective   Robaxin- has not taken this consistently; unsure of effectiveness   S/P gastric sleeve- cannot take nsaids     The following portions of the  "patient's history were reviewed and updated as appropriate: allergies, current medications, past family history, past medical history, past social history, past surgical history, and problem list.       Objective:    Blood pressure 123/78, pulse 96, temperature 97.9 °F (36.6 °C), temperature source Temporal, resp. rate 14, height 5' 2\" (1.575 m), weight 78.9 kg (174 lb), SpO2 99%.    Physical Exam  Vitals reviewed.   Constitutional:       Appearance: Normal appearance. She is not toxic-appearing or diaphoretic.   HENT:      Head: Normocephalic and atraumatic.      Comments: Pinpoint tenderness over bilateral greater and lesser occipital nerves with palpation     Nose: Nose normal.      Mouth/Throat:      Mouth: Mucous membranes are moist.      Pharynx: Oropharynx is clear.   Eyes:      Extraocular Movements: Extraocular movements intact.      Conjunctiva/sclera: Conjunctivae normal.      Pupils: Pupils are equal, round, and reactive to light.   Neck:      Comments: Cervical paraspinal and trapezius tension and tenderness  Cardiovascular:      Rate and Rhythm: Normal rate and regular rhythm.      Pulses: Normal pulses.      Heart sounds: Normal heart sounds.   Pulmonary:      Effort: Pulmonary effort is normal.      Breath sounds: Normal breath sounds.   Musculoskeletal:         General: Normal range of motion.      Cervical back: Normal range of motion and neck supple.      Right lower leg: No edema.      Left lower leg: No edema.   Skin:     General: Skin is warm.   Neurological:      General: No focal deficit present.      Mental Status: She is alert and oriented to person, place, and time.      Cranial Nerves: No cranial nerve deficit.      Sensory: No sensory deficit.      Motor: No weakness.      Coordination: Coordination normal.      Gait: Gait normal.      Deep Tendon Reflexes: Reflexes normal.   Psychiatric:         Mood and Affect: Mood normal.         Behavior: Behavior normal.         Thought Content: " Thought content normal.         Judgment: Judgment normal.      Comments: Intermittently tearful and anxious when describing her current state of health       Neurological Examination   On neurological examination patient is alert, awake, oriented and in no distress. Speech is fluent without dysarthria or aphasia. Cranial nerves 2-12 were symmetrically intact bilaterally. No evidence of any focal weakness or sensory loss in the upper or lower extremities. Motor testing reveals 5/5 strength of the bilateral upper and lower extremities.There was no pronator drift.  No fasciculations present. No abnormal involuntary movements. Finger- to-nose reveals no tremor or ataxia and intact proprioceptive function, no dysmetria was noted. Rapid alternating movement normal. Sensation was intact to vibration, light touch, and temperature in bilateral upper and lower extremities. Deep tendon reflexes were 2++ and symmetric in the bilateral upper and lower extremities. She is able to rise easily without assistance from a seated position. Casual gait is normal including stance, stride, and arm swing. Normal tandem gait. Romberg is absent.There is pinpoint tenderness over bilateral greater and lesser occipital nerves with palpation and cervical paraspinal and trapezius tension and tenderness with palpation.       ROS:    Review of Systems   Constitutional:  Negative for appetite change, fatigue and fever.   HENT: Negative.  Negative for hearing loss, tinnitus, trouble swallowing and voice change.    Eyes: Negative.  Negative for photophobia, pain and visual disturbance.   Respiratory: Negative.  Negative for shortness of breath.    Cardiovascular: Negative.  Negative for palpitations.   Gastrointestinal: Negative.  Negative for nausea and vomiting.   Endocrine: Negative.  Negative for cold intolerance.   Genitourinary: Negative.  Negative for dysuria, frequency and urgency.   Musculoskeletal:  Negative for back pain, gait problem,  myalgias, neck pain and neck stiffness.   Skin: Negative.  Negative for rash.   Allergic/Immunologic: Negative.    Neurological:  Positive for headaches (everyday switching her meds). Negative for dizziness, tremors, seizures, syncope, facial asymmetry, speech difficulty, weakness, light-headedness and numbness.   Hematological: Negative.  Does not bruise/bleed easily.   Psychiatric/Behavioral: Negative.  Negative for confusion, hallucinations and sleep disturbance.      Reviewed ROS as entered by medical assistant.

## 2024-01-16 NOTE — TELEPHONE ENCOUNTER
Caller: Kacey dumont     Doctor/Office: [facility]     Call regarding :  calling to obtain multiple records      Call was transferred to: Rec dept

## 2024-01-16 NOTE — PATIENT INSTRUCTIONS
Exam and history consistent with bilateral occipital neuralgia and cervicogenic headache 2/2 cervical degenerative disc disease  I would recommend continuing care with pain management; continue occipital nerve blocks and trigger point injections; consider ROSEMARIE  I do not feel incidental finding of left maxillary sinus retention cyst is contributing to headaches or dizziness; feel free to see ENT for their opinion  I would recommend completing additional labs; ordered today: TSH, CRP/Sed rate, Vitamin B12, Vitamin D, Anti-microsomal ab  If you do not tolerate Topiramate; you could consider Gabapentin or Amitriptyline as alternatives   I would recommend continuing a muscle relaxant such as robaxin as needed   Continue home physical therapy exercises  Follow up with your PCP, ENT, Pain management, and your primary Neurology team  Return to our office in the future as needed

## 2024-01-17 PROBLEM — M54.81 BILATERAL OCCIPITAL NEURALGIA: Status: ACTIVE | Noted: 2024-01-17

## 2024-01-17 PROBLEM — R21 RASH AND OTHER NONSPECIFIC SKIN ERUPTION: Status: ACTIVE | Noted: 2024-01-03

## 2024-01-17 PROBLEM — G44.86 CERVICOGENIC HEADACHE: Status: ACTIVE | Noted: 2024-01-17

## 2024-01-17 NOTE — ASSESSMENT & PLAN NOTE
Kamran Rees is a 48 year old female with a hx of migraine headaches and associated vertigo with recent onset of neck pain, occipital pain and lightheadedness/imbalance and nausea. We discussed today it is my opinion that the incidental finding of left maxillary sinus retention cyst is unlikely to be causing her symptoms. It is more likely her symptoms are related to bilateral occipital neuralgia/cervicogenic headache related to degenerative cervical disc disease. Vestibular migraine also remains on the differential but less likely given the improvement she has experienced with trigger point injections and cervical spine physical therapy exercises. I discussed with the patient today, if she has been unable to tolerate Topiramate other options to consider include gabapentin or amitriptyline. She should also consider occipital nerve blocks which are likely to be very effective. However, given her plan to continue care at [facility] (as they are managing her disability claims which we discussed our office would not do- would refer to [facility] for functional capacity evaluation) I will defer any changes in management to their discretion. She will return to our office on an as needed basis.     Plan:  Exam and history consistent with bilateral occipital neuralgia and cervicogenic headache 2/2 cervical degenerative disc disease  I would recommend continuing care with pain management; continue trigger point injections; consider ROSEMARIE or occipital nerve blocks  I do not feel incidental finding of left maxillary sinus retention cyst is contributing to headaches or dizziness; feel free to see ENT for their opinion  I would recommend completing additional labs; ordered today: TSH, CRP/Sed rate, Vitamin B12, Vitamin D, Anti-microsomal ab  If you do not tolerate Topiramate; you could consider Gabapentin or Amitriptyline as alternatives   I would recommend continuing a muscle relaxant such as robaxin as needed    Continue home physical therapy exercises  Follow up with your PCP, ENT, Pain management, and your primary Neurology team  Return to our office in the future as needed

## 2024-01-17 NOTE — ASSESSMENT & PLAN NOTE
Kamran Rees is a 48 year old female with a hx of migraine headaches and associated vertigo with recent onset of neck pain, occipital pain and lightheadedness/imbalance and nausea. We discussed today it is my opinion that the incidental finding of left maxillary sinus retention cyst is unlikely to be causing her symptoms. It is more likely her symptoms are related to bilateral occipital neuralgia/cervicogenic headache related to degenerative cervical disc disease. Vestibular migraine also remains on the differential but less likely given the improvement she has experienced with trigger point injections and cervical spine physical therapy exercises. I discussed with the patient today, if she has been unable to tolerate Topiramate other options to consider include gabapentin or amitriptyline. She should also consider occipital nerve blocks which are likely to be very effective. However, given her plan to continue care at [facility] (as they are managing her disability claims which we discussed our office would not do- would refer to [facility] for functional capacity evaluation) I will defer any changes in management to their discretion. She will return to our office on an as needed basis.     Plan:  Exam and history consistent with bilateral occipital neuralgia and cervicogenic headache 2/2 cervical degenerative disc disease  I would recommend continuing care with pain management; continue trigger point injections; consider ROSEMARIE or occipital nerve blocks  I do not feel incidental finding of left maxillary sinus retention cyst is contributing to headaches or dizziness; feel free to see ENT for their opinion  I would recommend completing additional labs; ordered today: TSH, CRP/Sed rate, Vitamin B12, Vitamin D, Anti-microsomal ab  If you do not tolerate Topiramate; you could consider Gabapentin or Amitriptyline as alternatives   I would recommend continuing a muscle relaxant such as robaxin as needed    Continue home physical therapy exercises  Follow up with your PCP, ENT, Pain management, and your primary Neurology team  Return to our office in the future as needed

## 2024-01-24 ENCOUNTER — OFFICE VISIT (OUTPATIENT)
Dept: OTOLARYNGOLOGY | Facility: CLINIC | Age: 49
End: 2024-01-24
Payer: COMMERCIAL

## 2024-01-24 VITALS — BODY MASS INDEX: 32.02 KG/M2 | WEIGHT: 174 LBS | HEIGHT: 62 IN

## 2024-01-24 DIAGNOSIS — R42 VERTIGO: ICD-10-CM

## 2024-01-24 DIAGNOSIS — G44.86 CERVICOGENIC HEADACHE: ICD-10-CM

## 2024-01-24 DIAGNOSIS — G89.29 CHRONIC NONINTRACTABLE HEADACHE, UNSPECIFIED HEADACHE TYPE: Primary | ICD-10-CM

## 2024-01-24 DIAGNOSIS — M54.81 BILATERAL OCCIPITAL NEURALGIA: ICD-10-CM

## 2024-01-24 DIAGNOSIS — M43.6 NECK STIFFNESS: ICD-10-CM

## 2024-01-24 DIAGNOSIS — M26.609 TMJ (TEMPOROMANDIBULAR JOINT SYNDROME): ICD-10-CM

## 2024-01-24 DIAGNOSIS — R51.9 CHRONIC NONINTRACTABLE HEADACHE, UNSPECIFIED HEADACHE TYPE: Primary | ICD-10-CM

## 2024-01-24 PROCEDURE — 99215 OFFICE O/P EST HI 40 MIN: CPT | Performed by: NURSE PRACTITIONER

## 2024-01-24 RX ORDER — IPRATROPIUM BROMIDE AND ALBUTEROL SULFATE 2.5; .5 MG/3ML; MG/3ML
SOLUTION RESPIRATORY (INHALATION)
COMMUNITY
Start: 2024-01-17

## 2024-01-24 NOTE — PATIENT INSTRUCTIONS
Concur with neurology and pain management plan     Possible causes - cervicogenic vertigo vs vestibular migraine    Research Botox injections vs nerve block for migraines. Consider Nurtec trial.    TMJ syndrome - soft diet, jaw rest, NSAIDs, warm compresses, massage, oral appliance, or consultation with dentist

## 2024-01-24 NOTE — ASSESSMENT & PLAN NOTE
Recurrent vertigo, chronic headaches, and neck pain.  Vertigo worse when bends forward. Pt reports dizziness has improved and head pain and neck pain remain primary concerns.      Discussed possible causes of vertigo including neurology, cardiac, autoimmune, Otitis media, sinusitis, recent surgery, lab abnormalities, and inner ear concerns.  Reviewed lab results from one year ago with no specific findings.      Recent neurology evaluation and underwent MRI cervical spine and brain.  Reviewed MRI cervical results avail and defer to PT, pain management and neurology for cervical spine concerns.      MRI brain with IAC in 2021 without any IAC/ear related findings.      PT for vestibular therapy feels symptoms worse. More recently sought PT care at outside facility and felt no change. Stopped PT due to cost.     Audiogram 10/2021 indicating normal bilateral hearing with mild SNHL at 4K, Tymps type A. Offered repeat audiogram although not necessary due to prior normal hearing test and pt denies hearing changes.       Treatment options include at home epley's, dramamine/meclizine, scopolamine patches (OTC), repeat lab studies, vestibular therapy, VNG testing, neurology consultation.  Differential remains cervicogenic vertigo vs vestibular migraine, Not benign positional vertigo. Encouraged pt to Research Botox injections vs nerve block for migraines as she has voice concerns with side effects with oral medications for migraines. Defer to neurology for further input.     Reviewed images of recent MRI and discussed concerns regarding sinus mucus retention cyst. Discussed with pt that CT is better imaging for sinus concerns. Reviewed actual images of CT head from 09/2023 that indicated no significant findings in sinus areas. Noted the nasal cavity has turbinate hypertrophy with slight DNS and narrow osteomeatal complexes. Reassured a mucus retention cyst is a normal anatomical variant and is not large enough to qualify for  surgical intervention and very unlikely to cause her symptoms. DNS is chronic finding and her symptoms became worse few months ago. She may consider discussion with DR Morrow or DR Colvin if she chooses to address DNS and turbinate hypertrophy for nasal congestion.      Additional concerns and findings include evidence of bruxism on exam.  Discussed TMJ syndrome - soft diet, jaw rest, NSAIDs, warm compresses, massage, oral appliance, or consultation with dentist    After discussion agreed to home exercises, consider PT for neck discomfort, continue consult neurology, High concern for vestibular migraines vs cervicogenic vertigo.   Ear pressure/ETD  Fluticasone nasal spray one spray each nostril twice per day  Astlein nasal spray prescribed     Follow up prn     Time spent with review prior to seeing patient and with patient and considering her options greater than 60 minutes

## 2024-01-24 NOTE — PROGRESS NOTES
Assessment/Plan:    Vertigo  Recurrent vertigo, chronic headaches, and neck pain.  Vertigo worse when bends forward. Pt reports dizziness has improved and head pain and neck pain remain primary concerns.      Discussed possible causes of vertigo including neurology, cardiac, autoimmune, Otitis media, sinusitis, recent surgery, lab abnormalities, and inner ear concerns.  Reviewed lab results from one year ago with no specific findings.      Recent neurology evaluation and underwent MRI cervical spine and brain.  Reviewed MRI cervical results avail and defer to PT, pain management and neurology for cervical spine concerns.      MRI brain with IAC in 2021 without any IAC/ear related findings.      PT for vestibular therapy feels symptoms worse. More recently sought PT care at outside facility and felt no change. Stopped PT due to cost.     Audiogram 10/2021 indicating normal bilateral hearing with mild SNHL at 4K, Tymps type A. Offered repeat audiogram although not necessary due to prior normal hearing test and pt denies hearing changes.       Treatment options include at home epley's, dramamine/meclizine, scopolamine patches (OTC), repeat lab studies, vestibular therapy, VNG testing, neurology consultation.  Differential remains cervicogenic vertigo vs vestibular migraine, Not benign positional vertigo. Encouraged pt to Research Botox injections vs nerve block for migraines as she has voice concerns with side effects with oral medications for migraines. Defer to neurology for further input.     Reviewed images of recent MRI and discussed concerns regarding sinus mucus retention cyst. Discussed with pt that CT is better imaging for sinus concerns. Reviewed actual images of CT head from 09/2023 that indicated no significant findings in sinus areas. Noted the nasal cavity has turbinate hypertrophy with slight DNS and narrow osteomeatal complexes. Reassured a mucus retention cyst is a normal anatomical variant and is not  large enough to qualify for surgical intervention and very unlikely to cause her symptoms. DNS is chronic finding and her symptoms became worse few months ago. She may consider discussion with DR Morrow or DR Colvin if she chooses to address DNS and turbinate hypertrophy for nasal congestion.      Additional concerns and findings include evidence of bruxism on exam.  Discussed TMJ syndrome - soft diet, jaw rest, NSAIDs, warm compresses, massage, oral appliance, or consultation with dentist    After discussion agreed to home exercises, consider PT for neck discomfort, continue consult neurology, High concern for vestibular migraines vs cervicogenic vertigo.   Ear pressure/ETD  Fluticasone nasal spray one spray each nostril twice per day  Astlein nasal spray prescribed     Follow up prn     Time spent with review prior to seeing patient and with patient and considering her options greater than 60 minutes           Diagnoses and all orders for this visit:    Chronic nonintractable headache, unspecified headache type    Vertigo    Neck stiffness    Cervicogenic headache    Bilateral occipital neuralgia    TMJ (temporomandibular joint syndrome)    Other orders  -     ipratropium-albuterol (DUO-NEB) 0.5-2.5 mg/3 mL nebulizer solution; INHALE 3 ML BY NEBULIZATION EVERY 4 HOURS AS NEEDED FOR WHEEZING          Subjective:      Patient ID: Kamran Rees is a 48 y.o. female.    Presents today for follow up due to vertigo.  Last seen 2 year ago for similar concern.  Swaying sensation, no spinning.  Nausea associated with dizziness.  Headaches more recently as well.  Feeling in forehead area.  Lightheadedness comes and goes.  No otlagia.  Tried some home exercises with no change.    Following chiropractor but not feeling sensation resolve.  Lab work and vestibular PT completed two year ago.     Prior visit, intermittent episodes approx 2 per year of short lived vertigo.  Sept 2023, while at work developed bad headache. Treated with  "Advil. Developed dizziness and blurred vision.  Treated in ER, Ct done in ER. Treated by PCP and chiropractor.        Since last visit, Following neurology for migraine concerns. Sought second opinion with another neurologist. Ceased Topamax. Vision improved, less dizzy sensation.  Increased headaches since stopped medications. Undergoing injections with pain management and noticed improvement in neck pain but head pain persists. Considering nerve block for migraines. Last PT visit Dec 15th 2023. Stopped due to cost.  Using at home exercises for neck.         The following portions of the patient's history were reviewed and updated as appropriate: allergies, current medications, past family history, past medical history, past social history, past surgical history, and problem list.    Review of Systems   Constitutional: Negative.    HENT:  Negative for congestion, ear discharge, ear pain, hearing loss, nosebleeds, postnasal drip, rhinorrhea, sinus pressure, sinus pain, sore throat, tinnitus and voice change.    Respiratory:  Negative for chest tightness and shortness of breath.    Musculoskeletal:  Positive for neck pain and neck stiffness.   Skin:  Negative for color change.   Neurological:  Positive for dizziness, light-headedness and headaches. Negative for numbness.   Psychiatric/Behavioral: Negative.           Objective:      Ht 5' 2\" (1.575 m)   Wt 78.9 kg (174 lb)   BMI 31.83 kg/m²          Physical Exam  Constitutional:       Appearance: She is well-developed.   HENT:      Head: Normocephalic.      Right Ear: Hearing, tympanic membrane, ear canal and external ear normal. No decreased hearing noted. No drainage or tenderness. Tympanic membrane is not perforated or erythematous.      Left Ear: Hearing, tympanic membrane, ear canal and external ear normal. No decreased hearing noted. No drainage or tenderness. Tympanic membrane is not perforated or erythematous.      Nose: Nose normal. No nasal deformity or " septal deviation.      Mouth/Throat:      Mouth: Mucous membranes are not pale and not dry. No oral lesions.      Dentition: Normal dentition.      Pharynx: Uvula midline. No oropharyngeal exudate.   Neck:      Trachea: No tracheal deviation.   Pulmonary:      Effort: Pulmonary effort is normal. No accessory muscle usage or respiratory distress.   Musculoskeletal:      Cervical back: Full passive range of motion without pain and neck supple.   Lymphadenopathy:      Cervical: No cervical adenopathy.   Skin:     General: Skin is warm and dry.   Neurological:      Mental Status: She is alert and oriented to person, place, and time.      Cranial Nerves: No cranial nerve deficit.      Sensory: No sensory deficit.   Psychiatric:         Behavior: Behavior is cooperative.

## 2024-01-25 ENCOUNTER — OFFICE VISIT (OUTPATIENT)
Dept: PAIN MEDICINE | Facility: CLINIC | Age: 49
End: 2024-01-25
Payer: COMMERCIAL

## 2024-01-25 ENCOUNTER — TELEPHONE (OUTPATIENT)
Dept: NEUROLOGY | Facility: CLINIC | Age: 49
End: 2024-01-25

## 2024-01-25 ENCOUNTER — TELEPHONE (OUTPATIENT)
Dept: PAIN MEDICINE | Facility: CLINIC | Age: 49
End: 2024-01-25

## 2024-01-25 VITALS
WEIGHT: 177 LBS | DIASTOLIC BLOOD PRESSURE: 77 MMHG | SYSTOLIC BLOOD PRESSURE: 134 MMHG | HEART RATE: 92 BPM | BODY MASS INDEX: 32.37 KG/M2

## 2024-01-25 DIAGNOSIS — M54.12 CERVICAL RADICULOPATHY: Primary | ICD-10-CM

## 2024-01-25 DIAGNOSIS — G44.86 CERVICOGENIC HEADACHE: ICD-10-CM

## 2024-01-25 DIAGNOSIS — M47.812 CERVICAL SPONDYLOSIS: ICD-10-CM

## 2024-01-25 DIAGNOSIS — G89.4 CHRONIC PAIN SYNDROME: ICD-10-CM

## 2024-01-25 PROCEDURE — 99214 OFFICE O/P EST MOD 30 MIN: CPT | Performed by: STUDENT IN AN ORGANIZED HEALTH CARE EDUCATION/TRAINING PROGRAM

## 2024-01-25 RX ORDER — TIZANIDINE 2 MG/1
2 TABLET ORAL 2 TIMES DAILY PRN
Qty: 60 TABLET | Refills: 0 | Status: SHIPPED | OUTPATIENT
Start: 2024-01-25 | End: 2024-02-24

## 2024-01-25 NOTE — H&P (VIEW-ONLY)
Pain Medicine Follow-Up Note    Assessment:  1. Cervical radiculopathy    2. Cervicogenic headache    3. Chronic pain syndrome    4. Cervical spondylosis      Patient is a pleasant 48-year-old woman who returns as a follow-up visit after last being seen on 12/7/2023 for symptoms of cervical radiculopathy, cervical spondylosis and cervical myofascial pain syndrome.  At that time patient was counseled to continue with physical therapy and home exercise program and started on Robaxin.  Since that visit patient did see ENT who recommended patient continue PT for vestibular therapy, refer back to pain management for cervical spine concerns.  Since last visit patient's her symptoms are worse rating her pain as 8 out of 10 on numeric rating scale.  Patient does report getting ongoing benefit though from bilateral cervical paraspinal muscle trigger point injections with local anesthetic and steroid.  Her pain is worse throughout the day and the pain is constant, occurring 100% of the time.  The quality pain is dull and aching, sharp, throbbing, pressure-like, shooting, stabbing in her bilateral neck radiating into her head.  Of note patient with MRI of the cervical spine from 1/11/2024 significant for disc herniation at C3-C4 and C4-C5 with central and foraminal stenosis along with osteophyte complex at multiple levels.  Given patient's continued symptoms of cervical radiculopathy and cervicogenic headaches in the setting of cervical stenosis and central and foraminal narrowing at multiple levels of the cervical spine think is reasonable to move forward with C7-T1 cervical epidural steroid injection under fluoroscopic guidance.  Patient counseled risk and benefits of injection therapy and like to proceed.  Additionally for symptomatic relief we will start tizanidine 2 mg twice daily as needed.  Discussed neuropathic agents but patient is set to see neurology and will likely start gabapentin next week for symptoms.  Patient  has failed 6 weeks of conservative management with other medications and physical therapy.    Plan:  Schedule for C7-T1 VANESSA under fluoroscopic guidance   Epidural Injection Medical Necessity  The patient has evidence of  cervical canal stenosis  severe enough to greatly impact quality of life or function.     The patient is concurrently involved in a conservative treatment plan including:    [x] Medications   [x] Physical therapy   [] Psychological therapy   [x] Home exercise or stretching program   [x] Multidisciplinary healthcare provider team    Plan to perform with contrast without documented contrast allergy. No more than 15mg dexamethasone planned per session. The patient has not undergone more than 4 injections in the last 12 months.    If Repeat Epidural  [] Prior epidural provided >50% pain relief and/or function for at least three months.  [] Patient failed to respond to prior epidural and plan for alternative approach as above.    2. Start tizanidine 2 mg BID prn  3. Follow up with Neurology about starting gabapentin  4. Continue PT and HEP  No orders of the defined types were placed in this encounter.      New Medications Ordered This Visit   Medications   • tiZANidine (ZANAFLEX) 2 mg tablet     Sig: Take 1 tablet (2 mg total) by mouth 2 (two) times a day as needed for muscle spasms     Dispense:  60 tablet     Refill:  0       My impressions and treatment recommendations were discussed in detail with the patient who verbalized understanding and had no further questions.        Complete risks and benefits including bleeding, infection, tissue reaction, nerve injury and allergic reaction were discussed. The approach was demonstrated using models and literature was provided. Verbal and written consent was obtained.      Follow-up is planned in four weeks time or sooner as warranted.  Discharge instructions were provided. I personally saw and examined the patient and I agree with the above discussed plan  of care.    History of Present Illness:    Kamran Rees is a 48 y.o. female who presents to [facility] for interval re-evaluation of the above stated pain complaints. The patient has a past medical and chronic pain history as outlined in the assessment section. She was last seen on 12/7/2023.    Patient is a pleasant 48-year-old woman who returns as a follow-up visit after last being seen on 12/7/2023 for symptoms of cervical radiculopathy, cervical spondylosis and cervical myofascial pain syndrome.  At that time patient was counseled to continue with physical therapy and home exercise program and started on Robaxin.  Since that visit patient did see ENT who recommended patient continue PT for vestibular therapy, refer back to pain management for cervical spine concerns.  Since last visit patient's her symptoms are worse rating her pain as 8 out of 10 on numeric rating scale.  Patient does report getting ongoing benefit though from bilateral cervical paraspinal muscle trigger point injections with local anesthetic and steroid.  Her pain is worse throughout the day and the pain is constant, occurring 100% of the time.  The quality pain is dull and aching, sharp, throbbing, pressure-like, shooting, stabbing in her bilateral neck radiating into her head.      Other than as stated above, the patient denies any interval changes in medications, medical condition, mental condition, symptoms, or allergies since the last office visit.         Review of Systems:    Review of Systems   Constitutional:  Negative for unexpected weight change.   HENT:  Negative for ear pain.    Eyes:  Negative for visual disturbance.   Respiratory:  Negative for shortness of breath and wheezing.    Gastrointestinal:  Negative for abdominal pain.   Musculoskeletal:  Negative for back pain.        Pain in the head,  with vistion issues   Neurological:  Positive for light-headedness, numbness (tingles in right leg) and  headaches. Negative for weakness.   Psychiatric/Behavioral:  Positive for decreased concentration, dysphoric mood and sleep disturbance. The patient is nervous/anxious.          Past Medical History:   Diagnosis Date   • Acid reflux    • Asthma    • Baker cyst, left    • Constipation    • Cough    • CPAP (continuous positive airway pressure) dependence    • Digestive disorder    • Fatigue    • Headache    • Headache(784.0)    • History of mammogram 07/17/2018   • Increased urinary frequency    • Joint pain    • Migraine    • Obesity (BMI 30-39.9)    • Postgastrectomy malabsorption    • Seasonal allergies    • Shortness of breath    • Sleep apnea    • Sleep difficulties    • Vertigo        Past Surgical History:   Procedure Laterality Date   • CARPAL TUNNEL RELEASE     • EGD     • ENDOMETRIAL ABLATION W/ NOVASURE  2014    Laparscopy, D&C, Novasure ablation   • FOOT SURGERY     • HYSTERECTOMY  12/20/2016    B/L salpingectomy   • KNEE SURGERY     • PARTIAL HYSTERECTOMY     • OH LAPS GSTRC RSTRICTIV PX LONGITUDINAL GASTRECTOMY N/A 8/4/2020    Procedure: LAPAROSCOPIC SLEEVE  GASTRECTOMY;  Surgeon: Gerson Engel MD;  Location: [facility];  Service: Bariatrics   • TENDON REPAIR  2016   • TUBAL LIGATION  08/06/2010    Laparoscopy tubal cautery       Family History   Problem Relation Age of Onset   • No Known Problems Mother    • Canavan disease Father    • Cancer Father    • Breast cancer Maternal Grandmother    • Breast cancer Paternal Grandmother        Social History     Occupational History   • Occupation: Unemployed    Tobacco Use   • Smoking status: Never   • Smokeless tobacco: Never   Vaping Use   • Vaping status: Never Used   Substance and Sexual Activity   • Alcohol use: Yes     Comment: Occasionally   • Drug use: No   • Sexual activity: Yes     Partners: Male     Birth control/protection: Female Sterilization         Current Outpatient Medications:   •  azelastine (ASTELIN) 0.1 % nasal spray, 1 spray into each  nostril 2 (two) times a day Use in each nostril as directed, Disp: 1 mL, Rfl: 2  •  budesonide-formoterol (SYMBICORT) 80-4.5 MCG/ACT inhaler, , Disp: , Rfl:   •  clonazePAM (KlonoPIN) 0.5 mg tablet, Take 0.5 mg by mouth 2 (two) times a day, Disp: , Rfl:   •  fluticasone (FLONASE) 50 mcg/act nasal spray, 1 spray into each nostril 2 (two) times a day, Disp: 1 g, Rfl: 6  •  ipratropium-albuterol (DUO-NEB) 0.5-2.5 mg/3 mL nebulizer solution, INHALE 3 ML BY NEBULIZATION EVERY 4 HOURS AS NEEDED FOR WHEEZING, Disp: , Rfl:   •  losartan-hydrochlorothiazide (HYZAAR) 50-12.5 mg per tablet, Take 1 tablet by mouth daily, Disp: , Rfl:   •  Multiple Vitamin (multivitamin) tablet, Take 1 tablet by mouth daily Bariatric, Disp: , Rfl:   •  tiZANidine (ZANAFLEX) 2 mg tablet, Take 1 tablet (2 mg total) by mouth 2 (two) times a day as needed for muscle spasms, Disp: 60 tablet, Rfl: 0  •  triamcinolone (KENALOG) 0.025 % cream, Apply topically 2 (two) times a day, Disp: 30 g, Rfl: 0  •  meclizine (ANTIVERT) 25 mg tablet, Take 25 mg by mouth 2 (two) times a day as needed, Disp: , Rfl:   •  topiramate (TOPAMAX) 25 mg sprinkle capsule, Take 100 mg by mouth 2 (two) times a day (Patient not taking: Reported on 1/25/2024), Disp: , Rfl:     Allergies   Allergen Reactions   • Cholestyramine Other (See Comments)     Other reaction(s): Cough   Seasonal Allergies   • Meloxicam Nausea Only   • Seasonal Ic [Cholestatin] Cough     Seasonal Allergies         Physical Exam:    /77   Pulse 92   Wt 80.3 kg (177 lb)   BMI 32.37 kg/m²     Constitutional:normal, well developed, well nourished, alert, in no distress and non-toxic and no overt pain behavior.  Eyes:anicteric  HEENT:grossly intact  Neck:supple, symmetric, trachea midline and no masses   Pulmonary:even and unlabored  Cardiovascular:No edema or pitting edema present  Skin:Normal without rashes or lesions and well hydrated  Psychiatric:Mood and affect appropriate  Neurologic:Cranial Nerves  II-XII grossly intact  Musculoskeletal:normal gait.       Imaging     Media Information      Document Information    Radiology Results Ext   Radiology   01/11/2024 11:47 AM   Attached To:   EXTERNAL IMAGING [[id number]]   Scanned Document on 1/11/24 with Provider Scan   Source Information    Interface, Transcription Incoming     No orders to display         No orders of the defined types were placed in this encounter.

## 2024-01-25 NOTE — TELEPHONE ENCOUNTER
Received via [employer] request for medical records from [email or web address].  Request was scanned into  and faxed to [facility].

## 2024-01-25 NOTE — PROGRESS NOTES
Pain Medicine Follow-Up Note    Assessment:  1. Cervical radiculopathy    2. Cervicogenic headache    3. Chronic pain syndrome    4. Cervical spondylosis      Patient is a pleasant 48-year-old woman who returns as a follow-up visit after last being seen on 12/7/2023 for symptoms of cervical radiculopathy, cervical spondylosis and cervical myofascial pain syndrome.  At that time patient was counseled to continue with physical therapy and home exercise program and started on Robaxin.  Since that visit patient did see ENT who recommended patient continue PT for vestibular therapy, refer back to pain management for cervical spine concerns.  Since last visit patient's her symptoms are worse rating her pain as 8 out of 10 on numeric rating scale.  Patient does report getting ongoing benefit though from bilateral cervical paraspinal muscle trigger point injections with local anesthetic and steroid.  Her pain is worse throughout the day and the pain is constant, occurring 100% of the time.  The quality pain is dull and aching, sharp, throbbing, pressure-like, shooting, stabbing in her bilateral neck radiating into her head.  Of note patient with MRI of the cervical spine from 1/11/2024 significant for disc herniation at C3-C4 and C4-C5 with central and foraminal stenosis along with osteophyte complex at multiple levels.  Given patient's continued symptoms of cervical radiculopathy and cervicogenic headaches in the setting of cervical stenosis and central and foraminal narrowing at multiple levels of the cervical spine think is reasonable to move forward with C7-T1 cervical epidural steroid injection under fluoroscopic guidance.  Patient counseled risk and benefits of injection therapy and like to proceed.  Additionally for symptomatic relief we will start tizanidine 2 mg twice daily as needed.  Discussed neuropathic agents but patient is set to see neurology and will likely start gabapentin next week for symptoms.  Patient  has failed 6 weeks of conservative management with other medications and physical therapy.    Plan:  Schedule for C7-T1 VANESSA under fluoroscopic guidance   Epidural Injection Medical Necessity  The patient has evidence of  cervical canal stenosis  severe enough to greatly impact quality of life or function.     The patient is concurrently involved in a conservative treatment plan including:    [x] Medications   [x] Physical therapy   [] Psychological therapy   [x] Home exercise or stretching program   [x] Multidisciplinary healthcare provider team    Plan to perform with contrast without documented contrast allergy. No more than 15mg dexamethasone planned per session. The patient has not undergone more than 4 injections in the last 12 months.    If Repeat Epidural  [] Prior epidural provided >50% pain relief and/or function for at least three months.  [] Patient failed to respond to prior epidural and plan for alternative approach as above.    2. Start tizanidine 2 mg BID prn  3. Follow up with Neurology about starting gabapentin  4. Continue PT and HEP  No orders of the defined types were placed in this encounter.      New Medications Ordered This Visit   Medications   • tiZANidine (ZANAFLEX) 2 mg tablet     Sig: Take 1 tablet (2 mg total) by mouth 2 (two) times a day as needed for muscle spasms     Dispense:  60 tablet     Refill:  0       My impressions and treatment recommendations were discussed in detail with the patient who verbalized understanding and had no further questions.        Complete risks and benefits including bleeding, infection, tissue reaction, nerve injury and allergic reaction were discussed. The approach was demonstrated using models and literature was provided. Verbal and written consent was obtained.      Follow-up is planned in four weeks time or sooner as warranted.  Discharge instructions were provided. I personally saw and examined the patient and I agree with the above discussed plan  of care.    History of Present Illness:    Kamran Rees is a 48 y.o. female who presents to [facility] for interval re-evaluation of the above stated pain complaints. The patient has a past medical and chronic pain history as outlined in the assessment section. She was last seen on 12/7/2023.    Patient is a pleasant 48-year-old woman who returns as a follow-up visit after last being seen on 12/7/2023 for symptoms of cervical radiculopathy, cervical spondylosis and cervical myofascial pain syndrome.  At that time patient was counseled to continue with physical therapy and home exercise program and started on Robaxin.  Since that visit patient did see ENT who recommended patient continue PT for vestibular therapy, refer back to pain management for cervical spine concerns.  Since last visit patient's her symptoms are worse rating her pain as 8 out of 10 on numeric rating scale.  Patient does report getting ongoing benefit though from bilateral cervical paraspinal muscle trigger point injections with local anesthetic and steroid.  Her pain is worse throughout the day and the pain is constant, occurring 100% of the time.  The quality pain is dull and aching, sharp, throbbing, pressure-like, shooting, stabbing in her bilateral neck radiating into her head.      Other than as stated above, the patient denies any interval changes in medications, medical condition, mental condition, symptoms, or allergies since the last office visit.         Review of Systems:    Review of Systems   Constitutional:  Negative for unexpected weight change.   HENT:  Negative for ear pain.    Eyes:  Negative for visual disturbance.   Respiratory:  Negative for shortness of breath and wheezing.    Gastrointestinal:  Negative for abdominal pain.   Musculoskeletal:  Negative for back pain.        Pain in the head,  with vistion issues   Neurological:  Positive for light-headedness, numbness (tingles in right leg) and  headaches. Negative for weakness.   Psychiatric/Behavioral:  Positive for decreased concentration, dysphoric mood and sleep disturbance. The patient is nervous/anxious.          Past Medical History:   Diagnosis Date   • Acid reflux    • Asthma    • Baker cyst, left    • Constipation    • Cough    • CPAP (continuous positive airway pressure) dependence    • Digestive disorder    • Fatigue    • Headache    • Headache(784.0)    • History of mammogram 07/17/2018   • Increased urinary frequency    • Joint pain    • Migraine    • Obesity (BMI 30-39.9)    • Postgastrectomy malabsorption    • Seasonal allergies    • Shortness of breath    • Sleep apnea    • Sleep difficulties    • Vertigo        Past Surgical History:   Procedure Laterality Date   • CARPAL TUNNEL RELEASE     • EGD     • ENDOMETRIAL ABLATION W/ NOVASURE  2014    Laparscopy, D&C, Novasure ablation   • FOOT SURGERY     • HYSTERECTOMY  12/20/2016    B/L salpingectomy   • KNEE SURGERY     • PARTIAL HYSTERECTOMY     • IL LAPS GSTRC RSTRICTIV PX LONGITUDINAL GASTRECTOMY N/A 8/4/2020    Procedure: LAPAROSCOPIC SLEEVE  GASTRECTOMY;  Surgeon: Gerson Engel MD;  Location: [facility];  Service: Bariatrics   • TENDON REPAIR  2016   • TUBAL LIGATION  08/06/2010    Laparoscopy tubal cautery       Family History   Problem Relation Age of Onset   • No Known Problems Mother    • Canavan disease Father    • Cancer Father    • Breast cancer Maternal Grandmother    • Breast cancer Paternal Grandmother        Social History     Occupational History   • Occupation: Unemployed    Tobacco Use   • Smoking status: Never   • Smokeless tobacco: Never   Vaping Use   • Vaping status: Never Used   Substance and Sexual Activity   • Alcohol use: Yes     Comment: Occasionally   • Drug use: No   • Sexual activity: Yes     Partners: Male     Birth control/protection: Female Sterilization         Current Outpatient Medications:   •  azelastine (ASTELIN) 0.1 % nasal spray, 1 spray into each  nostril 2 (two) times a day Use in each nostril as directed, Disp: 1 mL, Rfl: 2  •  budesonide-formoterol (SYMBICORT) 80-4.5 MCG/ACT inhaler, , Disp: , Rfl:   •  clonazePAM (KlonoPIN) 0.5 mg tablet, Take 0.5 mg by mouth 2 (two) times a day, Disp: , Rfl:   •  fluticasone (FLONASE) 50 mcg/act nasal spray, 1 spray into each nostril 2 (two) times a day, Disp: 1 g, Rfl: 6  •  ipratropium-albuterol (DUO-NEB) 0.5-2.5 mg/3 mL nebulizer solution, INHALE 3 ML BY NEBULIZATION EVERY 4 HOURS AS NEEDED FOR WHEEZING, Disp: , Rfl:   •  losartan-hydrochlorothiazide (HYZAAR) 50-12.5 mg per tablet, Take 1 tablet by mouth daily, Disp: , Rfl:   •  Multiple Vitamin (multivitamin) tablet, Take 1 tablet by mouth daily Bariatric, Disp: , Rfl:   •  tiZANidine (ZANAFLEX) 2 mg tablet, Take 1 tablet (2 mg total) by mouth 2 (two) times a day as needed for muscle spasms, Disp: 60 tablet, Rfl: 0  •  triamcinolone (KENALOG) 0.025 % cream, Apply topically 2 (two) times a day, Disp: 30 g, Rfl: 0  •  meclizine (ANTIVERT) 25 mg tablet, Take 25 mg by mouth 2 (two) times a day as needed, Disp: , Rfl:   •  topiramate (TOPAMAX) 25 mg sprinkle capsule, Take 100 mg by mouth 2 (two) times a day (Patient not taking: Reported on 1/25/2024), Disp: , Rfl:     Allergies   Allergen Reactions   • Cholestyramine Other (See Comments)     Other reaction(s): Cough   Seasonal Allergies   • Meloxicam Nausea Only   • Seasonal Ic [Cholestatin] Cough     Seasonal Allergies         Physical Exam:    /77   Pulse 92   Wt 80.3 kg (177 lb)   BMI 32.37 kg/m²     Constitutional:normal, well developed, well nourished, alert, in no distress and non-toxic and no overt pain behavior.  Eyes:anicteric  HEENT:grossly intact  Neck:supple, symmetric, trachea midline and no masses   Pulmonary:even and unlabored  Cardiovascular:No edema or pitting edema present  Skin:Normal without rashes or lesions and well hydrated  Psychiatric:Mood and affect appropriate  Neurologic:Cranial Nerves  II-XII grossly intact  Musculoskeletal:normal gait.       Imaging     Media Information      Document Information    Radiology Results Ext   Radiology   01/11/2024 11:47 AM   Attached To:   EXTERNAL IMAGING [[id number]]   Scanned Document on 1/11/24 with Provider Scan   Source Information    Interface, Transcription Incoming     No orders to display         No orders of the defined types were placed in this encounter.

## 2024-01-25 NOTE — TELEPHONE ENCOUNTER
Scheduled patient for VANESSA 02/02/2024  Patient denies RX blood thinners/ NSAIDS  Nothing to eat or drink 1 hour prior to procedure  Needs to arrange transportation  Proper clothing for procedure  No vaccines 2 weeks prior or after procedure  If ill or place on antibiotics, please call to reschedule

## 2024-02-01 ENCOUNTER — APPOINTMENT (OUTPATIENT)
Dept: LAB | Facility: CLINIC | Age: 49
End: 2024-02-01
Payer: COMMERCIAL

## 2024-02-01 DIAGNOSIS — G44.86 CERVICOGENIC HEADACHE: ICD-10-CM

## 2024-02-01 DIAGNOSIS — M54.81 BILATERAL OCCIPITAL NEURALGIA: ICD-10-CM

## 2024-02-01 LAB
25(OH)D3 SERPL-MCNC: 25 NG/ML (ref 30–100)
CRP SERPL QL: <1 MG/L
ERYTHROCYTE [SEDIMENTATION RATE] IN BLOOD: 12 MM/HOUR (ref 0–19)
TSH SERPL DL<=0.05 MIU/L-ACNC: 0.6 UIU/ML (ref 0.45–4.5)
VIT B12 SERPL-MCNC: 296 PG/ML (ref 180–914)

## 2024-02-01 PROCEDURE — 85652 RBC SED RATE AUTOMATED: CPT

## 2024-02-01 PROCEDURE — 82607 VITAMIN B-12: CPT

## 2024-02-01 PROCEDURE — 86140 C-REACTIVE PROTEIN: CPT

## 2024-02-01 PROCEDURE — 86376 MICROSOMAL ANTIBODY EACH: CPT

## 2024-02-01 PROCEDURE — 84443 ASSAY THYROID STIM HORMONE: CPT

## 2024-02-01 PROCEDURE — 82306 VITAMIN D 25 HYDROXY: CPT

## 2024-02-01 PROCEDURE — 36415 COLL VENOUS BLD VENIPUNCTURE: CPT

## 2024-02-02 ENCOUNTER — HOSPITAL ENCOUNTER (OUTPATIENT)
Facility: AMBULARY SURGERY CENTER | Age: 49
Setting detail: OUTPATIENT SURGERY
Discharge: HOME | End: 2024-02-02
Attending: STUDENT IN AN ORGANIZED HEALTH CARE EDUCATION/TRAINING PROGRAM | Admitting: STUDENT IN AN ORGANIZED HEALTH CARE EDUCATION/TRAINING PROGRAM
Payer: COMMERCIAL

## 2024-02-02 ENCOUNTER — APPOINTMENT (OUTPATIENT)
Dept: RADIOLOGY | Facility: HOSPITAL | Age: 49
End: 2024-02-02
Payer: COMMERCIAL

## 2024-02-02 VITALS
OXYGEN SATURATION: 100 % | TEMPERATURE: 97.8 F | SYSTOLIC BLOOD PRESSURE: 136 MMHG | HEART RATE: 75 BPM | DIASTOLIC BLOOD PRESSURE: 82 MMHG | RESPIRATION RATE: 18 BRPM

## 2024-02-02 PROBLEM — M54.12 CERVICAL RADICULOPATHY: Status: ACTIVE | Noted: 2024-02-02

## 2024-02-02 LAB — THYROPEROXIDASE AB SERPL-ACNC: 37 IU/ML (ref 0–34)

## 2024-02-02 PROCEDURE — NC001 PR NO CHARGE: Performed by: STUDENT IN AN ORGANIZED HEALTH CARE EDUCATION/TRAINING PROGRAM

## 2024-02-02 PROCEDURE — 62321 NJX INTERLAMINAR CRV/THRC: CPT | Performed by: STUDENT IN AN ORGANIZED HEALTH CARE EDUCATION/TRAINING PROGRAM

## 2024-02-02 RX ORDER — LIDOCAINE HYDROCHLORIDE 10 MG/ML
INJECTION, SOLUTION EPIDURAL; INFILTRATION; INTRACAUDAL; PERINEURAL AS NEEDED
Status: DISCONTINUED | OUTPATIENT
Start: 2024-02-02 | End: 2024-02-02 | Stop reason: HOSPADM

## 2024-02-02 RX ORDER — DEXAMETHASONE SODIUM PHOSPHATE 10 MG/ML
INJECTION, SOLUTION INTRAMUSCULAR; INTRAVENOUS AS NEEDED
Status: DISCONTINUED | OUTPATIENT
Start: 2024-02-02 | End: 2024-02-02 | Stop reason: HOSPADM

## 2024-02-02 NOTE — DISCHARGE INSTRUCTIONS
Epidural Steroid Injection   WHAT YOU NEED TO KNOW:   An epidural steroid injection (ROSEMARIE) is a procedure to inject steroid medicine into the epidural space. The epidural space is between your spinal cord and vertebrae. Steroids reduce inflammation and fluid buildup in your spine that may be causing pain. You may be given pain medicine along with the steroids.          ACTIVITY  Do not drive or operate machinery today.  No strenuous activity today - bending, lifting, etc.  You may resume normal activites starting tomorrow - start slowly and as tolerated.  You may shower today, but no tub baths or hot tubs.  You may have numbness for several hours from the local anesthetic. Please use caution and common sense, especially with weight-bearing activities.    CARE OF THE INJECTION SITE  If you have soreness or pain, apply ice to the area today (20 minutes on/20 minutes off).  Starting tomorrow, you may use warm, moist heat or ice if needed.  You may have an increase or change in your discomfort for 36-48 hours after your treatment.  Apply ice and continue with any pain medication you have been prescribed.  Notify the [facility] if you have any of the following: redness, drainage, swelling, headache, stiff neck or fever above 100°F.    SPECIAL INSTRUCTIONS  Our office will contact you in approximately 7 days for a progress report.    MEDICATIONS  Continue to take all routine medications.  Our office may have instructed you to hold some medications.    As no general anesthesia was used in today's procedure, you should not experience any side effects related to anesthesia.     If you are diabetic, the steroids used in today's injection may temporarily increase your blood sugar levels after the first few days after your injection. Please keep a close eye on your sugars and alert the doctor who manages your diabetes if your sugars are significantly high from your baseline or you are symptomatic.     If you have a  problem specifically related to your procedure, please call our office at [phone].  Problems not related to your procedure should be directed to your primary care physician.

## 2024-02-02 NOTE — OP NOTE
Pre-procedure Diagnosis: Cervical Radiculopathy  Post-procedure Diagnosis: Cervical radiculopathy  Procedure Title(s):  1. C7-T1 interlaminar epidural steroid injection      2. Intraoperative fluoroscopy  Attending Surgeon:   Ronni Landry MD  Anesthesia:   Local     Indications: The patient is a 48 y.o. year-old female with a diagnosis of Cervical radiculopathy. The patient's history and physical exam were reviewed. The risks, benefits and alternatives to the procedure were discussed, and all questions were answered to the patient's satisfaction. The patient agreed to proceed, and written informed consent was obtained.    Procedure in Detail: The patient was brought into the procedure room and placed in the prone position on the fluoroscopy table. The area of the cervical spine was prepped with chlorhexidine gluconate solution times one and draped in a sterile manner.    The C7-T1 interspace was identified and marked under AP fluoroscopy. The skin and subcutaneous tissues in the area were anesthetized with 1% lidocaine. A 20-gauge Tuohy epidural needle was directed toward the interspace under fluoroscopic guidance until the ligamentum flavum was engaged. The C-arm was oblique to the right to obtain a contra-lateral oblique view.  From this point, a loss of resistance technique with air was used to identify entrance of the needle into the epidural space. Once an appropriate loss was obtained, negative aspiration was confirmed, and 1 ml Omnipaque 300 contrast solution was injected. An appropriate epidurogram was noted.    Then, after negative aspiration, a solution consisting of 1-mL dexamethasone (10mg/ml) and 2-mL preservative-free saline was easily injected. The needle was removed with a 1% lidocaine flush. The patient's back was cleaned and a bandage was placed over the site of needle insertion.    Disposition: The patient tolerated the procedure well, and there were no apparent complications. The patient was  taken to the recovery area where written discharge instructions for the procedure were given.     Estimated Blood Loss: None  Specimens Obtained: N/A

## 2024-02-02 NOTE — INTERVAL H&P NOTE
H&P reviewed. After examining the patient I find no changes in the patients condition since the H&P had been written.    Vitals:    02/02/24 1304   BP: 128/87   Pulse: 74   Resp: 18   Temp: 97.8 °F (36.6 °C)   SpO2: 99%

## 2024-02-05 ENCOUNTER — OFFICE VISIT (OUTPATIENT)
Dept: OBGYN CLINIC | Facility: CLINIC | Age: 49
End: 2024-02-05
Payer: COMMERCIAL

## 2024-02-05 ENCOUNTER — TELEPHONE (OUTPATIENT)
Dept: OBGYN CLINIC | Facility: CLINIC | Age: 49
End: 2024-02-05

## 2024-02-05 VITALS
HEIGHT: 62 IN | DIASTOLIC BLOOD PRESSURE: 90 MMHG | BODY MASS INDEX: 28.45 KG/M2 | WEIGHT: 154.6 LBS | SYSTOLIC BLOOD PRESSURE: 130 MMHG

## 2024-02-05 DIAGNOSIS — B96.89 BV (BACTERIAL VAGINOSIS): Primary | ICD-10-CM

## 2024-02-05 DIAGNOSIS — N89.8 VAGINAL ITCHING: ICD-10-CM

## 2024-02-05 DIAGNOSIS — N76.0 BV (BACTERIAL VAGINOSIS): Primary | ICD-10-CM

## 2024-02-05 PROCEDURE — 87480 CANDIDA DNA DIR PROBE: CPT | Performed by: PHYSICIAN ASSISTANT

## 2024-02-05 PROCEDURE — 87210 SMEAR WET MOUNT SALINE/INK: CPT | Performed by: PHYSICIAN ASSISTANT

## 2024-02-05 PROCEDURE — 87510 GARDNER VAG DNA DIR PROBE: CPT | Performed by: PHYSICIAN ASSISTANT

## 2024-02-05 PROCEDURE — 87660 TRICHOMONAS VAGIN DIR PROBE: CPT | Performed by: PHYSICIAN ASSISTANT

## 2024-02-05 PROCEDURE — 99213 OFFICE O/P EST LOW 20 MIN: CPT | Performed by: PHYSICIAN ASSISTANT

## 2024-02-05 RX ORDER — METRONIDAZOLE 7.5 MG/G
1 GEL VAGINAL
Qty: 5 G | Refills: 0 | Status: SHIPPED | OUTPATIENT
Start: 2024-02-05 | End: 2024-02-06

## 2024-02-05 NOTE — TELEPHONE ENCOUNTER
Pt has infection, has creamy discharge, burning with urination, itching. Had steroid injection , antibiotic last week, wants something called in. Switched pharmacy to [facility] [place]

## 2024-02-05 NOTE — TELEPHONE ENCOUNTER
Left voice mail for patient to call back, Patient can try OTC monistat or move annual to sooner date.

## 2024-02-05 NOTE — TELEPHONE ENCOUNTER
Patient coming in today to see Ирина for OVS. Patient states has tried OTC Monistat 3 times symptoms will not subside. Patient states has been on multiple different antibiotics. Patient will keep annual as scheduled for next week.

## 2024-02-05 NOTE — PROGRESS NOTES
Assessment/Plan:       Diagnoses and all orders for this visit:    BV (bacterial vaginosis)  -     metroNIDAZOLE (METROGEL) 0.75 % vaginal gel; Insert 1 Application into the vagina daily at bedtime for 5 days  -     Cancel: Vaginosis DNA Probe  -     Vaginosis DNA Probe    Vaginal itching  -     Cancel: Vaginosis DNA Probe  -     Vaginosis DNA Probe  -     POCT wet mount     48-year-old female presenting today for vaginal symptoms unresponsive to 3-day Monistat as in HPI.  Recent cervical spine injection, recent antibiotic and steroid use.    Wet mount clue cells.  Exam with excessive white discharge, monistat use last night.    PLAN:  Vaginosis Probe  Will initiate tx for suspected BV with metronidazole vaginal gel supp x 5 nights.  Will call with test results and f/u with her at that time.   Has annual w/ [facility] next week.    Chief Complaint   Patient presents with    Vaginitis     Sx started 3 days ago with burning and discharge. Pt used otc monistat no relief.         Subjective:      Patient ID: Kamran Rees is a 48 y.o. female.    47y/o F here today for 3-4 days of vaginal sxs.   Has been on abx and steroids for other issues just prior.   She tried 3 day monistat without any improvement.  Reports abnormal discharge, thin, creamy.   No odor.  Denies pelvic pain.  Urination is painful, mainly when urine touches genitals also with wiping.   Denies urinary frequency, urgency, flank pain, hematuria, fever or chills.         The following portions of the patient's history were reviewed and updated as appropriate: allergies, current medications, past family history, past medical history, past social history, past surgical history, and problem list.    Review of Systems   Constitutional: Negative.  Negative for chills and fever.   Gastrointestinal: Negative.    Genitourinary:  Positive for vaginal discharge. Negative for dysuria, flank pain, frequency, hematuria, menstrual problem, pelvic pain, urgency, vaginal  "bleeding and vaginal pain.         Objective:      /90 (BP Location: Left arm, Patient Position: Sitting, Cuff Size: Adult)   Ht 5' 2\" (1.575 m)   Wt 70.1 kg (154 lb 9.6 oz)   BMI 28.28 kg/m²          Physical Exam  Vitals reviewed.   Constitutional:       Appearance: Normal appearance. She is not ill-appearing.   Abdominal:      Tenderness: There is no abdominal tenderness.   Genitourinary:     General: Normal vulva.      Labia:         Right: No rash, tenderness or lesion.         Left: No rash, tenderness or lesion.       Vagina: Vaginal discharge (large amount of smooth thick white discharge within vaginal vault, difficult to asses as pt used monistat last night) present. No erythema, tenderness, bleeding or lesions.      Cervix: No cervical motion tenderness, discharge, friability, lesion, erythema or cervical bleeding.      Uterus: Not tender.       Adnexa:         Right: No tenderness.          Left: No tenderness.     Lymphadenopathy:      Lower Body: No right inguinal adenopathy. No left inguinal adenopathy.   Neurological:      Mental Status: She is alert and oriented to person, place, and time.   Psychiatric:         Mood and Affect: Mood normal.         Behavior: Behavior normal. Behavior is cooperative.           "

## 2024-02-05 NOTE — TELEPHONE ENCOUNTER
Patient has not been seen in a year.  Can try OTC Monistat 7 day vaginal cream or make appointment.  Thanks.

## 2024-02-06 ENCOUNTER — TELEPHONE (OUTPATIENT)
Dept: OBGYN CLINIC | Facility: CLINIC | Age: 49
End: 2024-02-06

## 2024-02-06 DIAGNOSIS — N76.0 BV (BACTERIAL VAGINOSIS): Primary | ICD-10-CM

## 2024-02-06 DIAGNOSIS — B96.89 BV (BACTERIAL VAGINOSIS): Primary | ICD-10-CM

## 2024-02-06 LAB
BV WHIFF TEST VAG QL: NEGATIVE
CANDIDA RRNA VAG QL PROBE: DETECTED
CLUE CELLS SPEC QL WET PREP: PRESENT
G VAGINALIS RRNA GENITAL QL PROBE: DETECTED
PH SMN: 5 [PH]
SL AMB POCT WET MOUNT: ABNORMAL
T VAGINALIS RRNA GENITAL QL PROBE: NOT DETECTED
T VAGINALIS VAG QL WET PREP: ABNORMAL
YEAST VAG QL WET PREP: ABNORMAL

## 2024-02-06 RX ORDER — METRONIDAZOLE 500 MG/1
500 TABLET ORAL EVERY 12 HOURS SCHEDULED
Qty: 14 TABLET | Refills: 0 | Status: SHIPPED | OUTPATIENT
Start: 2024-02-06 | End: 2024-02-13

## 2024-02-06 NOTE — TELEPHONE ENCOUNTER
Patient was here yesterday and you ordered  Metrogel, the pharmacy said it would cost her $75.00 she would like to know if you can switch her to Flagyl

## 2024-02-07 DIAGNOSIS — B37.31 VAGINAL YEAST INFECTION: Primary | ICD-10-CM

## 2024-02-07 RX ORDER — FLUCONAZOLE 150 MG/1
TABLET ORAL
Qty: 2 TABLET | Refills: 0 | Status: SHIPPED | OUTPATIENT
Start: 2024-02-07 | End: 2024-02-16

## 2024-02-09 ENCOUNTER — TELEPHONE (OUTPATIENT)
Dept: PAIN MEDICINE | Facility: CLINIC | Age: 49
End: 2024-02-09

## 2024-02-09 NOTE — TELEPHONE ENCOUNTER
Pt reports 25% improvement post inj  Pain level 5/10  Pt aware I will call next week for an update    She said her neurologist gave her Qulipta 50mg to try

## 2024-02-12 ENCOUNTER — ANNUAL EXAM (OUTPATIENT)
Dept: OBGYN CLINIC | Facility: CLINIC | Age: 49
End: 2024-02-12
Payer: COMMERCIAL

## 2024-02-12 VITALS
SYSTOLIC BLOOD PRESSURE: 116 MMHG | HEIGHT: 62 IN | WEIGHT: 176 LBS | BODY MASS INDEX: 32.39 KG/M2 | DIASTOLIC BLOOD PRESSURE: 72 MMHG

## 2024-02-12 DIAGNOSIS — Z01.419 ENCOUNTER FOR GYNECOLOGICAL EXAMINATION WITHOUT ABNORMAL FINDING: Primary | ICD-10-CM

## 2024-02-12 DIAGNOSIS — Z12.31 ENCOUNTER FOR SCREENING MAMMOGRAM FOR BREAST CANCER: ICD-10-CM

## 2024-02-12 PROCEDURE — S0612 ANNUAL GYNECOLOGICAL EXAMINA: HCPCS | Performed by: PHYSICIAN ASSISTANT

## 2024-02-12 NOTE — PROGRESS NOTES
"Assessment/Plan:    No problem-specific Assessment & Plan notes found for this encounter.       Diagnoses and all orders for this visit:    Encounter for gynecological examination without abnormal finding    Encounter for screening mammogram for breast cancer  -     Mammo screening bilateral w 3d & cad; Future        Pap not indicated.  Order for mammogram entered.  Finish Flagyl and take Diflucan as prescribed.  If no problems, patient to return in 1 year for routine gyn care.    Subjective:      Patient ID: Kamran Rees is a 48 y.o. female.    Patient is here for yearly gyn exam.  States she is doing well overall.  S/p hysterectomy.  Currently being treated for BV and yeast infections.  Denies bowel/bladder changes, vaginal bleeding, pelvic pain, bloating, abdominal pain, n/v, change in appetite, and thyroid disease.    Due for mammogram.  Patient is performing self-breast exam.  Denies new masses, skin changes, nipple discharge, and pain/tenderness.        The following portions of the patient's history were reviewed and updated as appropriate: allergies, current medications, past family history, past medical history, past social history, past surgical history, and problem list.    Review of Systems   Constitutional:  Negative for appetite change and unexpected weight change.   Cardiovascular:         No masses, skin changes, nipple discharge, and pain/tenderness.   Gastrointestinal:  Negative for abdominal distention, abdominal pain, constipation, diarrhea, nausea and vomiting.   Genitourinary:  Negative for difficulty urinating, dysuria, frequency, genital sores, hematuria, menstrual problem, pelvic pain, urgency, vaginal bleeding, vaginal discharge and vaginal pain.         Objective:      /72 (BP Location: Left arm, Patient Position: Sitting, Cuff Size: Adult)   Ht 5' 2\" (1.575 m)   Wt 79.8 kg (176 lb)   BMI 32.19 kg/m²          Physical Exam  Vitals reviewed. Exam conducted with a chaperone present. "   Constitutional:       Appearance: Normal appearance. She is well-developed.   Neck:      Thyroid: No thyromegaly.   Pulmonary:      Effort: Pulmonary effort is normal.   Chest:   Breasts:     Breasts are symmetrical.      Right: Normal. No swelling, bleeding, inverted nipple, mass, nipple discharge, skin change or tenderness.      Left: Normal. No swelling, bleeding, inverted nipple, mass, nipple discharge, skin change or tenderness.   Abdominal:      General: There is no distension.      Palpations: Abdomen is soft.      Tenderness: There is no abdominal tenderness.   Genitourinary:     General: Normal vulva.      Pubic Area: No rash.       Labia:         Right: No rash, tenderness, lesion or injury.         Left: No rash, tenderness, lesion or injury.       Vagina: Normal. No vaginal discharge, erythema, tenderness or bleeding.      Uterus: Absent.       Adnexa: Right adnexa normal and left adnexa normal.        Right: No mass, tenderness or fullness.          Left: No mass, tenderness or fullness.        Comments: Cervix and uterus surgically absent.  Musculoskeletal:      Cervical back: Neck supple.   Lymphadenopathy:      Cervical: No cervical adenopathy.      Upper Body:      Right upper body: No supraclavicular or axillary adenopathy.      Left upper body: No supraclavicular or axillary adenopathy.      Lower Body: No right inguinal adenopathy. No left inguinal adenopathy.   Skin:     General: Skin is warm and dry.   Neurological:      Mental Status: She is alert and oriented to person, place, and time.   Psychiatric:         Behavior: Behavior normal. Behavior is cooperative.         Thought Content: Thought content normal.         Judgment: Judgment normal.

## 2024-02-21 DIAGNOSIS — G44.86 CERVICOGENIC HEADACHE: ICD-10-CM

## 2024-02-21 RX ORDER — TIZANIDINE 2 MG/1
2 TABLET ORAL 2 TIMES DAILY PRN
Qty: 60 TABLET | Refills: 0 | Status: SHIPPED | OUTPATIENT
Start: 2024-02-21

## 2024-02-26 ENCOUNTER — OFFICE VISIT (OUTPATIENT)
Dept: PAIN MEDICINE | Facility: CLINIC | Age: 49
End: 2024-02-26
Payer: COMMERCIAL

## 2024-02-26 VITALS
SYSTOLIC BLOOD PRESSURE: 106 MMHG | DIASTOLIC BLOOD PRESSURE: 73 MMHG | WEIGHT: 175 LBS | HEART RATE: 88 BPM | BODY MASS INDEX: 32.2 KG/M2 | HEIGHT: 62 IN

## 2024-02-26 DIAGNOSIS — R42 VERTIGO: ICD-10-CM

## 2024-02-26 DIAGNOSIS — G89.4 CHRONIC PAIN SYNDROME: ICD-10-CM

## 2024-02-26 DIAGNOSIS — M43.6 NECK STIFFNESS: ICD-10-CM

## 2024-02-26 DIAGNOSIS — G44.229 CHRONIC TENSION-TYPE HEADACHE, NOT INTRACTABLE: ICD-10-CM

## 2024-02-26 DIAGNOSIS — M54.81 BILATERAL OCCIPITAL NEURALGIA: ICD-10-CM

## 2024-02-26 DIAGNOSIS — F41.9 ANXIETY: ICD-10-CM

## 2024-02-26 DIAGNOSIS — G93.32 CHRONIC FATIGUE SYNDROME: ICD-10-CM

## 2024-02-26 DIAGNOSIS — M54.2 NECK PAIN: Primary | ICD-10-CM

## 2024-02-26 PROCEDURE — 99214 OFFICE O/P EST MOD 30 MIN: CPT | Performed by: STUDENT IN AN ORGANIZED HEALTH CARE EDUCATION/TRAINING PROGRAM

## 2024-02-26 RX ORDER — GABAPENTIN 300 MG/1
300 CAPSULE ORAL 3 TIMES DAILY
Qty: 90 CAPSULE | Refills: 0 | Status: SHIPPED | OUTPATIENT
Start: 2024-02-26 | End: 2024-03-27

## 2024-02-26 NOTE — PROGRESS NOTES
Pain Medicine Follow-Up Note    Assessment:  1. Neck pain    2. Bilateral occipital neuralgia    3. Vertigo    4. Chronic fatigue syndrome    5. Chronic tension-type headache, not intractable    6. Anxiety    7. Neck stiffness    8. Chronic pain syndrome      Patient presents as a follow-up visit after last being seen on 2/2/2024 where she underwent a C7-T1 cervical epidural steroid injection.  Patient symptoms are the same since her last visit rating her pain as a 7 out of 10 on numeric rating scale.  States the injection was helpful in 4 hours.  She describes pain is worse in the morning and afternoon and pain is constant, occurring 100% of the time.  The quality pain is sharp, throbbing, pressure-like, shooting, pins-and-needles, stabbing primarily in her bilateral occipital ridge causing her to have headaches.  Patient is also underwent trigger point injections at the occipital ridge with combination of local anesthetic and steroid which also did not provide any benefit.  Patient is currently being managed for headaches by neurologist with [facility].  In regards to medications patient is currently on tizanidine 2 mg twice daily which states does reduce her symptoms by 25%.     On further discussion with patient, though there is a mild cervicogenic component to her headaches, I do not believe this is the primary cause of her headaches given her continued nausea and photophobia associated with it. Patient currently managed by Neurology with [facility] who started Qulipta and recommended physical therapy which I agree with. Patient has now failed greater occipital nerve block and VANESSA, only receiving a week of relief with LEONARD. Agree with recommendation to consider botox injection in the future. For symptomatic relief will continue tizanidine and start gabapentin 300 mg TID.   Plan:  Continue tizanidine 2-4 mg TID prn  Start gabapentin 300 mg TID. Titration schedule provided  Start physical therapy  Continue follow  up with Neurology for [facility] for Qulipta and consideration of botox injections   No orders of the defined types were placed in this encounter.      New Medications Ordered This Visit   Medications   • gabapentin (NEURONTIN) 300 mg capsule     Sig: Take 1 capsule (300 mg total) by mouth 3 (three) times a day Start with one tablet nightly for 7 days, increase to twice daily for 7 days and then increase to three times a day.     Dispense:  90 capsule     Refill:  0       My impressions and treatment recommendations were discussed in detail with the patient who verbalized understanding and had no further questions.        Follow-up is planned in four weeks time or sooner as warranted.  Discharge instructions were provided. I personally saw and examined the patient and I agree with the above discussed plan of care.    History of Present Illness:    Kamran Rees is a 48 y.o. female who presents to [facility] for interval re-evaluation of the above stated pain complaints. The patient has a past medical and chronic pain history as outlined in the assessment section. She was last seen on 2/2/2024.    Patient presents as a follow-up visit after last being seen on 2/2/2024 where she underwent a C7-T1 cervical epidural steroid injection.  Patient symptoms are the same since her last visit rating her pain as a 7 out of 10 on numeric rating scale.  States the injection was helpful in 4 hours.  She describes pain is worse in the morning and afternoon and pain is constant, occurring 100% of the time.  The quality pain is sharp, throbbing, pressure-like, shooting, pins-and-needles, stabbing primarily in her bilateral occipital ridge causing her to have headaches.  Patient is also underwent trigger point injections at the occipital ridge with combination of local anesthetic and steroid which also did not provide any benefit.  Patient is currently being managed for headaches by neurologist with [facility].   In regards to medications patient is currently on tizanidine 2 mg twice daily which states does reduce her symptoms by 25%.       Other than as stated above, the patient denies any interval changes in medications, medical condition, mental condition, symptoms, or allergies since the last office visit.         Review of Systems:    Review of Systems   Constitutional:  Negative for unexpected weight change.   HENT:  Negative for ear pain.    Eyes:  Negative for visual disturbance.   Respiratory:  Negative for shortness of breath and wheezing.    Gastrointestinal:  Negative for abdominal pain.   Musculoskeletal:  Positive for neck pain and neck stiffness. Negative for back pain.        Pain in the neck and head and sides of head   Neurological:  Positive for dizziness, light-headedness and headaches. Negative for weakness and numbness.   Psychiatric/Behavioral:  Positive for decreased concentration, dysphoric mood and sleep disturbance.          Past Medical History:   Diagnosis Date   • Acid reflux    • Asthma    • Baker cyst, left    • Constipation    • Cough    • CPAP (continuous positive airway pressure) dependence    • Digestive disorder    • Fatigue    • Headache    • Headache(784.0)    • History of mammogram 07/17/2018   • Increased urinary frequency    • Joint pain    • Migraine    • Obesity (BMI 30-39.9)    • Postgastrectomy malabsorption    • Seasonal allergies    • Shortness of breath    • Sleep apnea    • Sleep difficulties    • Vertigo        Past Surgical History:   Procedure Laterality Date   • CARPAL TUNNEL RELEASE     • EGD     • ENDOMETRIAL ABLATION W/ ZAC  2014    Laparscopy, D&C, Novasure ablation   • EPIDURAL BLOCK INJECTION N/A 2/2/2024    Procedure: C7-T1 CERVICAL EPIDURAL STEROID INJECTION;  Surgeon: Ronni Landry MD;  Location: [facility] MAIN OR;  Service: Pain Management    • FOOT SURGERY     • HYSTERECTOMY  12/20/2016    B/L salpingectomy   • KNEE SURGERY     • PARTIAL HYSTERECTOMY     •  [place] LAPS GSTRC RSTRICTIV PX LONGITUDINAL GASTRECTOMY N/A 8/4/2020    Procedure: LAPAROSCOPIC SLEEVE  GASTRECTOMY;  Surgeon: Gerson Engel MD;  Location: [facility] MAIN OR;  Service: Bariatrics   • TENDON REPAIR  2016   • TUBAL LIGATION  08/06/2010    Laparoscopy tubal cautery       Family History   Problem Relation Age of Onset   • No Known Problems Mother    • Canavan disease Father    • Cancer Father    • Breast cancer Maternal Grandmother    • Breast cancer Paternal Grandmother        Social History     Occupational History   • Occupation: Unemployed    Tobacco Use   • Smoking status: Never   • Smokeless tobacco: Never   Vaping Use   • Vaping status: Never Used   Substance and Sexual Activity   • Alcohol use: Yes     Comment: Occasionally   • Drug use: No   • Sexual activity: Yes     Partners: Male     Birth control/protection: Female Sterilization         Current Outpatient Medications:   •  azelastine (ASTELIN) 0.1 % nasal spray, 1 spray into each nostril 2 (two) times a day Use in each nostril as directed, Disp: 1 mL, Rfl: 2  •  budesonide-formoterol (SYMBICORT) 80-4.5 MCG/ACT inhaler, , Disp: , Rfl:   •  clonazePAM (KlonoPIN) 0.5 mg tablet, Take 0.5 mg by mouth 2 (two) times a day, Disp: , Rfl:   •  fluticasone (FLONASE) 50 mcg/act nasal spray, 1 spray into each nostril 2 (two) times a day, Disp: 1 g, Rfl: 6  •  gabapentin (NEURONTIN) 300 mg capsule, Take 1 capsule (300 mg total) by mouth 3 (three) times a day Start with one tablet nightly for 7 days, increase to twice daily for 7 days and then increase to three times a day., Disp: 90 capsule, Rfl: 0  •  ipratropium-albuterol (DUO-NEB) 0.5-2.5 mg/3 mL nebulizer solution, INHALE 3 ML BY NEBULIZATION EVERY 4 HOURS AS NEEDED FOR WHEEZING, Disp: , Rfl:   •  losartan-hydrochlorothiazide (HYZAAR) 50-12.5 mg per tablet, Take 1 tablet by mouth daily, Disp: , Rfl:   •  Multiple Vitamin (multivitamin) tablet, Take 1 tablet by mouth daily Bariatric, Disp: , Rfl:   •   "tiZANidine (ZANAFLEX) 2 mg tablet, TAKE 1 TABLET(2 MG) BY MOUTH TWICE DAILY AS NEEDED FOR MUSCLE SPASMS, Disp: 60 tablet, Rfl: 0  •  triamcinolone (KENALOG) 0.025 % cream, Apply topically 2 (two) times a day, Disp: 30 g, Rfl: 0  •  meclizine (ANTIVERT) 25 mg tablet, Take 25 mg by mouth 2 (two) times a day as needed, Disp: , Rfl:   •  topiramate (TOPAMAX) 25 mg sprinkle capsule, Take 100 mg by mouth 2 (two) times a day (Patient not taking: Reported on 1/25/2024), Disp: , Rfl:     Allergies   Allergen Reactions   • Cholestyramine Other (See Comments)     Other reaction(s): Cough   Seasonal Allergies   • Meloxicam Nausea Only   • Seasonal Ic [Cholestatin] Cough     Seasonal Allergies         Physical Exam:    /73   Pulse 88   Ht 5' 2\" (1.575 m)   Wt 79.4 kg (175 lb)   BMI 32.01 kg/m²     Constitutional:normal, well developed, well nourished, alert, in no distress and non-toxic and no overt pain behavior.  Eyes:anicteric  HEENT:grossly intact  Neck:supple, symmetric, trachea midline and no masses   Pulmonary:even and unlabored  Cardiovascular:No edema or pitting edema present  Skin:Normal without rashes or lesions and well hydrated  Psychiatric:Mood and affect appropriate  Neurologic:Cranial Nerves II-XII grossly intact  Musculoskeletal:normal gait. TTP in base of the skull on the left.       Imaging  No orders to display         No orders of the defined types were placed in this encounter.    "

## 2024-02-26 NOTE — PATIENT INSTRUCTIONS
-Will start gabapentin 300 mg three times a day. Patient to continue migraine meds as prescribed by [facility]. Agree with recommendation to consider botox if she continues to have moderate to severe headaches. If issues getting this through [facility], happy to submit. Would not recommend continued interventions for the neck, as I think her headaches though there is a cervicogenic component, is not the primary cause. Agree with recommendation for physical therapy as well. Will continue tizanidine, trialing increased dose of 4 mg three times a day as needed to see if it provides greater benefit.

## 2024-02-29 ENCOUNTER — TELEPHONE (OUTPATIENT)
Age: 49
End: 2024-02-29

## 2024-02-29 NOTE — TELEPHONE ENCOUNTER
Caller: fernanda dumont    Doctor: diana    Reason for call: please complete the [employer] paperwork in the pts chart.    Call back#: [phone]

## 2024-02-29 NOTE — TELEPHONE ENCOUNTER
This paperwork is not to be completed by [facility]. Our office does not provide long term disability    See 1/25/phone note.  Neurology to be handling the disability

## 2024-03-07 NOTE — TELEPHONE ENCOUNTER
Caller: [employer]    Doctor: NANCY    Reason for call: calling back regarding disability form. Aware [facility] does not do Long term Disability. Will FU with patient    Call back#:

## 2024-03-22 DIAGNOSIS — G44.86 CERVICOGENIC HEADACHE: ICD-10-CM

## 2024-03-22 RX ORDER — TIZANIDINE 2 MG/1
2 TABLET ORAL 2 TIMES DAILY PRN
Qty: 60 TABLET | Refills: 0 | Status: SHIPPED | OUTPATIENT
Start: 2024-03-22

## 2024-03-26 DIAGNOSIS — M54.81 BILATERAL OCCIPITAL NEURALGIA: ICD-10-CM

## 2024-04-01 RX ORDER — GABAPENTIN 300 MG/1
CAPSULE ORAL
Qty: 90 CAPSULE | Refills: 0 | Status: SHIPPED | OUTPATIENT
Start: 2024-04-01 | End: 2024-04-02

## 2024-04-02 ENCOUNTER — OFFICE VISIT (OUTPATIENT)
Age: 49
End: 2024-04-02
Payer: COMMERCIAL

## 2024-04-02 VITALS
HEART RATE: 80 BPM | HEIGHT: 62 IN | SYSTOLIC BLOOD PRESSURE: 121 MMHG | WEIGHT: 175.2 LBS | DIASTOLIC BLOOD PRESSURE: 80 MMHG | BODY MASS INDEX: 32.24 KG/M2

## 2024-04-02 DIAGNOSIS — M47.812 CERVICAL SPONDYLOSIS: Primary | ICD-10-CM

## 2024-04-02 DIAGNOSIS — G44.86 CERVICOGENIC HEADACHE: ICD-10-CM

## 2024-04-02 DIAGNOSIS — M54.12 CERVICAL RADICULOPATHY: ICD-10-CM

## 2024-04-02 PROCEDURE — 99214 OFFICE O/P EST MOD 30 MIN: CPT | Performed by: STUDENT IN AN ORGANIZED HEALTH CARE EDUCATION/TRAINING PROGRAM

## 2024-04-02 RX ORDER — GABAPENTIN 300 MG/1
CAPSULE ORAL
Qty: 150 CAPSULE | Refills: 1 | Status: ON HOLD | OUTPATIENT
Start: 2024-04-02 | End: 2024-04-12

## 2024-04-02 RX ORDER — METHYLPREDNISOLONE 4 MG/1
TABLET ORAL
COMMUNITY
Start: 2024-04-02 | End: 2024-04-09

## 2024-04-02 RX ORDER — ATOGEPANT 60 MG/1
1 TABLET ORAL DAILY
COMMUNITY
Start: 2024-03-14

## 2024-04-02 NOTE — H&P (VIEW-ONLY)
Assessment:  1. Cervical spondylosis    2. Cervical radiculopathy    3. Cervicogenic headache      Patient presents as a follow-up visit after last being seen on 2/26/2024 for symptoms of occipital neuralgia , Neck pain and low back pain.  At that time patient was continued on tizanidine 2 to 4 mg 3 times daily as needed and started on gabapentin 300 mg 3 times daily.  Patient was also counseled to start physical therapy and continue with neurology with 104 Qulipta.  Since that time patient symptoms are the same rating her pain as a 5 out of 10 on numeric rating scale.  The pain is worse in the evening and the pain is constant, occurring 100% at a time.  The quality pain is dull aching, sharp, throbbing, pressure-like primarily in her neck radiating up into her skull her right knee and also in her axial lower back.  Patient does not believe she is obtaining significant benefit from her current pain regimen though she does report 20% relief.  She denies any side effects.    Given patient's continued symptoms of neck pain with referred pain into her skull patient symptoms likely secondary to her known cervical spondylosis at C3, C4.  Given this we will schedule patient for diagnostic third occipital nerve, C3, C4 medial branch block 1.  Patient counseled risk and benefits of injection therapy and like to proceed.  Plan:  Schedule patient for TON, C3, C4 MBB #1    The patient is concurrently involved in a conservative treatment plan including:    [x] Medications   [x] Physical therapy   [] Psychological therapy   [x] Home exercise or stretching program   [x] Multidisciplinary healthcare provider team    Plan to perform without contrast without documented contrast allergy.     If this is for a repeat diagnostic MBB:  [] Prior diagnostic MBB provided >80% pain relief and/or function    2. Increase gabapentin to 600/300/600 mg daily   3. Continue tizanidine 2-4 mg tid prn  4. Continue PT and HEP       Complete risks and  benefits including bleeding, infection, tissue reaction, nerve injury and allergic reaction were discussed. The approach was demonstrated using models and literature was provided. Verbal and written consent was obtained.    My impressions and treatment recommendations were discussed in detail with the patient who verbalized understanding and had no further questions.  Discharge instructions were provided. I personally saw and examined the patient and I agree with the above discussed plan of care.    No orders of the defined types were placed in this encounter.    New Medications Ordered This Visit   Medications   • methylprednisolone (MEDROL) 4 mg tablet     Sig: Follow package directions   • Qulipta 60 MG TABS     Sig: Take 1 tablet by mouth daily   • gabapentin (NEURONTIN) 300 mg capsule     Sig: Take 600 mg in am and pm and 300 mg in afternoon.     Dispense:  150 capsule     Refill:  1       History of Present Illness:  Kamran Rees is a 48 y.o. female who presents for a follow up office visit in regards to Neck Pain and Headache.         Patient presents as a follow-up visit after last being seen on 2/26/2024 for symptoms of occipital neuralgia , Neck pain and low back pain.  At that time patient was continued on tizanidine 2 to 4 mg 3 times daily as needed and started on gabapentin 300 mg 3 times daily.  Patient was also counseled to start physical therapy and continue with neurology with 104 Qulipta.  Since that time patient symptoms are the same rating her pain as a 5 out of 10 on numeric rating scale.  The pain is worse in the evening and the pain is constant, occurring 100%  at a time.  The quality pain is dull aching, sharp, throbbing, pressure-like primarily in her neck radiating up into her skull her right knee and also in her axial lower back.  Patient does not believe she is obtaining significant benefit from her current pain regimen though she does report 20% relief.  She denies any side effects.    I  have personally reviewed and/or updated the patient's past medical history, past surgical history, family history, social history, current medications, allergies, and vital signs today.     Review of Systems   Constitutional:  Negative for unexpected weight change.   Eyes:  Negative for visual disturbance.   Respiratory:  Negative for shortness of breath.    Cardiovascular:  Negative for palpitations.   Gastrointestinal:  Positive for nausea. Negative for constipation.   Endocrine: Negative for polyphagia.   Genitourinary:  Negative for frequency.   Musculoskeletal:  Positive for arthralgias, back pain, gait problem, neck pain and neck stiffness. Negative for myalgias.   Skin:  Negative for rash.   Neurological:  Positive for dizziness, numbness and headaches. Negative for weakness.   Hematological:  Does not bruise/bleed easily.   Psychiatric/Behavioral:  Positive for confusion and sleep disturbance. The patient is not nervous/anxious.        Patient Active Problem List   Diagnosis   • Obesity (BMI 30.0-34.9)   • Mild intermittent asthma without complication   • History of hysterectomy   • Thyroid fullness   • Vertigo   • Vitamin D deficiency   • Postsurgical malabsorption   • Chronic headaches   • Neck stiffness   • S/P laparoscopic sleeve gastrectomy   • Anxiety   • Asthma   • Chronic fatigue syndrome   • Chronic seasonal allergic rhinitis   • Congenital pes planus of right foot   • Primary osteoarthritis of right knee   • Moderate obstructive sleep apnea   • Moderate persistent asthma without complication   • Plantar fibromatosis   • Primary hypertension   • S/P right knee arthroscopy   • Subluxation of right patella   • Tendinitis of right hand   • Rash and other nonspecific skin eruption   • Bilateral occipital neuralgia   • Cervicogenic headache   • Cervical radiculopathy       Past Medical History:   Diagnosis Date   • Acid reflux    • Asthma    • Baker cyst, left    • Constipation    • Cough    • CPAP  (continuous positive airway pressure) dependence    • Digestive disorder    • Fatigue    • Headache    • Headache(784.0)    • History of mammogram 07/17/2018   • Increased urinary frequency    • Joint pain    • Migraine    • Obesity (BMI 30-39.9)    • Postgastrectomy malabsorption    • Seasonal allergies    • Shortness of breath    • Sleep apnea    • Sleep difficulties    • Vertigo        Past Surgical History:   Procedure Laterality Date   • CARPAL TUNNEL RELEASE     • EGD     • ENDOMETRIAL ABLATION W/ NOVASURE  2014    Laparscopy, D&C, Novasure ablation   • EPIDURAL BLOCK INJECTION N/A 2/2/2024    Procedure: C7-T1 CERVICAL EPIDURAL STEROID INJECTION;  Surgeon: Ronni Landry MD;  Location: [facility] MAIN OR;  Service: Pain Management    • FOOT SURGERY     • HYSTERECTOMY  12/20/2016    B/L salpingectomy   • KNEE SURGERY     • PARTIAL HYSTERECTOMY     • [place] LAPS GSTRC RSTRICTIV PX LONGITUDINAL GASTRECTOMY N/A 8/4/2020    Procedure: LAPAROSCOPIC SLEEVE  GASTRECTOMY;  Surgeon: Gerson Engel MD;  Location: [facility] MAIN OR;  Service: Bariatrics   • TENDON REPAIR  2016   • TUBAL LIGATION  08/06/2010    Laparoscopy tubal cautery       Family History   Problem Relation Age of Onset   • No Known Problems Mother    • Canavan disease Father    • Cancer Father    • Breast cancer Maternal Grandmother    • Breast cancer Paternal Grandmother        Social History     Occupational History   • Occupation: Unemployed    Tobacco Use   • Smoking status: Never   • Smokeless tobacco: Never   Vaping Use   • Vaping status: Never Used   Substance and Sexual Activity   • Alcohol use: Yes     Comment: Occasionally   • Drug use: No   • Sexual activity: Yes     Partners: Male     Birth control/protection: Female Sterilization       Current Outpatient Medications on File Prior to Visit   Medication Sig   • azelastine (ASTELIN) 0.1 % nasal spray 1 spray into each nostril 2 (two) times a day Use in each nostril as directed   • budesonide-formoterol  "(SYMBICORT) 80-4.5 MCG/ACT inhaler    • clonazePAM (KlonoPIN) 0.5 mg tablet Take 0.5 mg by mouth 2 (two) times a day   • fluticasone (FLONASE) 50 mcg/act nasal spray 1 spray into each nostril 2 (two) times a day   • ipratropium-albuterol (DUO-NEB) 0.5-2.5 mg/3 mL nebulizer solution INHALE 3 ML BY NEBULIZATION EVERY 4 HOURS AS NEEDED FOR WHEEZING   • losartan-hydrochlorothiazide (HYZAAR) 50-12.5 mg per tablet Take 1 tablet by mouth daily   • methylprednisolone (MEDROL) 4 mg tablet Follow package directions   • Multiple Vitamin (multivitamin) tablet Take 1 tablet by mouth daily Bariatric   • Qulipta 60 MG TABS Take 1 tablet by mouth daily   • tiZANidine (ZANAFLEX) 2 mg tablet TAKE 1 TABLET(2 MG) BY MOUTH TWICE DAILY AS NEEDED FOR MUSCLE SPASMS   • triamcinolone (KENALOG) 0.025 % cream Apply topically 2 (two) times a day   • [DISCONTINUED] gabapentin (NEURONTIN) 300 mg capsule START WITH ONE CAPSULE BY MOUTH NIGHTLY FOR 7 DAYS. INCREASE TO 1 CAPSULE TWICE DAILY FOR 7 DAYS AND THEN INCREASE TO 1 CAPSULE THREE TIMES DAILY   • meclizine (ANTIVERT) 25 mg tablet Take 25 mg by mouth 2 (two) times a day as needed   • topiramate (TOPAMAX) 25 mg sprinkle capsule Take 100 mg by mouth 2 (two) times a day (Patient not taking: Reported on 1/25/2024)     No current facility-administered medications on file prior to visit.       Allergies   Allergen Reactions   • Cholestyramine Other (See Comments)     Other reaction(s): Cough   Seasonal Allergies   • Meloxicam Nausea Only   • Seasonal Ic [Cholestatin] Cough     Seasonal Allergies         Physical Exam:    /80   Pulse 80   Ht 5' 2\" (1.575 m)   Wt 79.5 kg (175 lb 3.2 oz)   BMI 32.04 kg/m²     Constitutional:normal, well developed, well nourished, alert, in no distress and non-toxic and no overt pain behavior.  Eyes:anicteric  HEENT:grossly intact  Neck:supple, symmetric, trachea midline and no masses   Pulmonary:even and unlabored  Cardiovascular:No edema or pitting edema " present  Skin:Normal without rashes or lesions and well hydrated  Psychiatric:Mood and affect appropriate  Neurologic:Cranial Nerves II-XII grossly intact  Musculoskeletal:normal gait. TTP in cervical spine with pain with extension and rotation of the cervical spine.     Imaging

## 2024-04-02 NOTE — H&P (VIEW-ONLY)
Assessment:  1. Cervical spondylosis    2. Cervical radiculopathy    3. Cervicogenic headache      Patient presents as a follow-up visit after last being seen on 2/26/2024 for symptoms of occipital neuralgia , Neck pain and low back pain.  At that time patient was continued on tizanidine 2 to 4 mg 3 times daily as needed and started on gabapentin 300 mg 3 times daily.  Patient was also counseled to start physical therapy and continue with neurology with 104 Qulipta.  Since that time patient symptoms are the same rating her pain as a 5 out of 10 on numeric rating scale.  The pain is worse in the evening and the pain is constant, occurring 100% at a time.  The quality pain is dull aching, sharp, throbbing, pressure-like primarily in her neck radiating up into her skull her right knee and also in her axial lower back.  Patient does not believe she is obtaining significant benefit from her current pain regimen though she does report 20% relief.  She denies any side effects.    Given patient's continued symptoms of neck pain with referred pain into her skull patient symptoms likely secondary to her known cervical spondylosis at C3, C4.  Given this we will schedule patient for diagnostic third occipital nerve, C3, C4 medial branch block 1.  Patient counseled risk and benefits of injection therapy and like to proceed.  Plan:  Schedule patient for TON, C3, C4 MBB #1    The patient is concurrently involved in a conservative treatment plan including:    [x] Medications   [x] Physical therapy   [] Psychological therapy   [x] Home exercise or stretching program   [x] Multidisciplinary healthcare provider team    Plan to perform without contrast without documented contrast allergy.     If this is for a repeat diagnostic MBB:  [] Prior diagnostic MBB provided >80% pain relief and/or function    2. Increase gabapentin to 600/300/600 mg daily   3. Continue tizanidine 2-4 mg tid prn  4. Continue PT and HEP       Complete risks and  benefits including bleeding, infection, tissue reaction, nerve injury and allergic reaction were discussed. The approach was demonstrated using models and literature was provided. Verbal and written consent was obtained.    My impressions and treatment recommendations were discussed in detail with the patient who verbalized understanding and had no further questions.  Discharge instructions were provided. I personally saw and examined the patient and I agree with the above discussed plan of care.    No orders of the defined types were placed in this encounter.    New Medications Ordered This Visit   Medications   • methylprednisolone (MEDROL) 4 mg tablet     Sig: Follow package directions   • Qulipta 60 MG TABS     Sig: Take 1 tablet by mouth daily   • gabapentin (NEURONTIN) 300 mg capsule     Sig: Take 600 mg in am and pm and 300 mg in afternoon.     Dispense:  150 capsule     Refill:  1       History of Present Illness:  Kamran Rees is a 48 y.o. female who presents for a follow up office visit in regards to Neck Pain and Headache.         Patient presents as a follow-up visit after last being seen on 2/26/2024 for symptoms of occipital neuralgia , Neck pain and low back pain.  At that time patient was continued on tizanidine 2 to 4 mg 3 times daily as needed and started on gabapentin 300 mg 3 times daily.  Patient was also counseled to start physical therapy and continue with neurology with 104 Qulipta.  Since that time patient symptoms are the same rating her pain as a 5 out of 10 on numeric rating scale.  The pain is worse in the evening and the pain is constant, occurring 100%  at a time.  The quality pain is dull aching, sharp, throbbing, pressure-like primarily in her neck radiating up into her skull her right knee and also in her axial lower back.  Patient does not believe she is obtaining significant benefit from her current pain regimen though she does report 20% relief.  She denies any side effects.    I  have personally reviewed and/or updated the patient's past medical history, past surgical history, family history, social history, current medications, allergies, and vital signs today.     Review of Systems   Constitutional:  Negative for unexpected weight change.   Eyes:  Negative for visual disturbance.   Respiratory:  Negative for shortness of breath.    Cardiovascular:  Negative for palpitations.   Gastrointestinal:  Positive for nausea. Negative for constipation.   Endocrine: Negative for polyphagia.   Genitourinary:  Negative for frequency.   Musculoskeletal:  Positive for arthralgias, back pain, gait problem, neck pain and neck stiffness. Negative for myalgias.   Skin:  Negative for rash.   Neurological:  Positive for dizziness, numbness and headaches. Negative for weakness.   Hematological:  Does not bruise/bleed easily.   Psychiatric/Behavioral:  Positive for confusion and sleep disturbance. The patient is not nervous/anxious.        Patient Active Problem List   Diagnosis   • Obesity (BMI 30.0-34.9)   • Mild intermittent asthma without complication   • History of hysterectomy   • Thyroid fullness   • Vertigo   • Vitamin D deficiency   • Postsurgical malabsorption   • Chronic headaches   • Neck stiffness   • S/P laparoscopic sleeve gastrectomy   • Anxiety   • Asthma   • Chronic fatigue syndrome   • Chronic seasonal allergic rhinitis   • Congenital pes planus of right foot   • Primary osteoarthritis of right knee   • Moderate obstructive sleep apnea   • Moderate persistent asthma without complication   • Plantar fibromatosis   • Primary hypertension   • S/P right knee arthroscopy   • Subluxation of right patella   • Tendinitis of right hand   • Rash and other nonspecific skin eruption   • Bilateral occipital neuralgia   • Cervicogenic headache   • Cervical radiculopathy       Past Medical History:   Diagnosis Date   • Acid reflux    • Asthma    • Baker cyst, left    • Constipation    • Cough    • CPAP  (continuous positive airway pressure) dependence    • Digestive disorder    • Fatigue    • Headache    • Headache(784.0)    • History of mammogram 07/17/2018   • Increased urinary frequency    • Joint pain    • Migraine    • Obesity (BMI 30-39.9)    • Postgastrectomy malabsorption    • Seasonal allergies    • Shortness of breath    • Sleep apnea    • Sleep difficulties    • Vertigo        Past Surgical History:   Procedure Laterality Date   • CARPAL TUNNEL RELEASE     • EGD     • ENDOMETRIAL ABLATION W/ NOVASURE  2014    Laparscopy, D&C, Novasure ablation   • EPIDURAL BLOCK INJECTION N/A 2/2/2024    Procedure: C7-T1 CERVICAL EPIDURAL STEROID INJECTION;  Surgeon: Ronni Landry MD;  Location: [facility] MAIN OR;  Service: Pain Management    • FOOT SURGERY     • HYSTERECTOMY  12/20/2016    B/L salpingectomy   • KNEE SURGERY     • PARTIAL HYSTERECTOMY     • GA LAPS GSTRC RSTRICTIV PX LONGITUDINAL GASTRECTOMY N/A 8/4/2020    Procedure: LAPAROSCOPIC SLEEVE  GASTRECTOMY;  Surgeon: Gerson Engel MD;  Location: [facility] MAIN OR;  Service: Bariatrics   • TENDON REPAIR  2016   • TUBAL LIGATION  08/06/2010    Laparoscopy tubal cautery       Family History   Problem Relation Age of Onset   • No Known Problems Mother    • Canavan disease Father    • Cancer Father    • Breast cancer Maternal Grandmother    • Breast cancer Paternal Grandmother        Social History     Occupational History   • Occupation: Unemployed    Tobacco Use   • Smoking status: Never   • Smokeless tobacco: Never   Vaping Use   • Vaping status: Never Used   Substance and Sexual Activity   • Alcohol use: Yes     Comment: Occasionally   • Drug use: No   • Sexual activity: Yes     Partners: Male     Birth control/protection: Female Sterilization       Current Outpatient Medications on File Prior to Visit   Medication Sig   • azelastine (ASTELIN) 0.1 % nasal spray 1 spray into each nostril 2 (two) times a day Use in each nostril as directed   • budesonide-formoterol  "(SYMBICORT) 80-4.5 MCG/ACT inhaler    • clonazePAM (KlonoPIN) 0.5 mg tablet Take 0.5 mg by mouth 2 (two) times a day   • fluticasone (FLONASE) 50 mcg/act nasal spray 1 spray into each nostril 2 (two) times a day   • ipratropium-albuterol (DUO-NEB) 0.5-2.5 mg/3 mL nebulizer solution INHALE 3 ML BY NEBULIZATION EVERY 4 HOURS AS NEEDED FOR WHEEZING   • losartan-hydrochlorothiazide (HYZAAR) 50-12.5 mg per tablet Take 1 tablet by mouth daily   • methylprednisolone (MEDROL) 4 mg tablet Follow package directions   • Multiple Vitamin (multivitamin) tablet Take 1 tablet by mouth daily Bariatric   • Qulipta 60 MG TABS Take 1 tablet by mouth daily   • tiZANidine (ZANAFLEX) 2 mg tablet TAKE 1 TABLET(2 MG) BY MOUTH TWICE DAILY AS NEEDED FOR MUSCLE SPASMS   • triamcinolone (KENALOG) 0.025 % cream Apply topically 2 (two) times a day   • [DISCONTINUED] gabapentin (NEURONTIN) 300 mg capsule START WITH ONE CAPSULE BY MOUTH NIGHTLY FOR 7 DAYS. INCREASE TO 1 CAPSULE TWICE DAILY FOR 7 DAYS AND THEN INCREASE TO 1 CAPSULE THREE TIMES DAILY   • meclizine (ANTIVERT) 25 mg tablet Take 25 mg by mouth 2 (two) times a day as needed   • topiramate (TOPAMAX) 25 mg sprinkle capsule Take 100 mg by mouth 2 (two) times a day (Patient not taking: Reported on 1/25/2024)     No current facility-administered medications on file prior to visit.       Allergies   Allergen Reactions   • Cholestyramine Other (See Comments)     Other reaction(s): Cough   Seasonal Allergies   • Meloxicam Nausea Only   • Seasonal Ic [Cholestatin] Cough     Seasonal Allergies         Physical Exam:    /80   Pulse 80   Ht 5' 2\" (1.575 m)   Wt 79.5 kg (175 lb 3.2 oz)   BMI 32.04 kg/m²     Constitutional:normal, well developed, well nourished, alert, in no distress and non-toxic and no overt pain behavior.  Eyes:anicteric  HEENT:grossly intact  Neck:supple, symmetric, trachea midline and no masses   Pulmonary:even and unlabored  Cardiovascular:No edema or pitting edema " present  Skin:Normal without rashes or lesions and well hydrated  Psychiatric:Mood and affect appropriate  Neurologic:Cranial Nerves II-XII grossly intact  Musculoskeletal:normal gait. TTP in cervical spine with pain with extension and rotation of the cervical spine.     Imaging

## 2024-04-02 NOTE — PROGRESS NOTES
Assessment:  1. Cervical spondylosis    2. Cervical radiculopathy    3. Cervicogenic headache      Patient presents as a follow-up visit after last being seen on 2/26/2024 for symptoms of occipital neuralgia , Neck pain and low back pain.  At that time patient was continued on tizanidine 2 to 4 mg 3 times daily as needed and started on gabapentin 300 mg 3 times daily.  Patient was also counseled to start physical therapy and continue with neurology with 104 Qulipta.  Since that time patient symptoms are the same rating her pain as a 5 out of 10 on numeric rating scale.  The pain is worse in the evening and the pain is constant, occurring 100% at a time.  The quality pain is dull aching, sharp, throbbing, pressure-like primarily in her neck radiating up into her skull her right knee and also in her axial lower back.  Patient does not believe she is obtaining significant benefit from her current pain regimen though she does report 20% relief.  She denies any side effects.    Given patient's continued symptoms of neck pain with referred pain into her skull patient symptoms likely secondary to her known cervical spondylosis at C3, C4.  Given this we will schedule patient for diagnostic third occipital nerve, C3, C4 medial branch block 1.  Patient counseled risk and benefits of injection therapy and like to proceed.  Plan:  Schedule patient for TON, C3, C4 MBB #1    The patient is concurrently involved in a conservative treatment plan including:    [x] Medications   [x] Physical therapy   [] Psychological therapy   [x] Home exercise or stretching program   [x] Multidisciplinary healthcare provider team    Plan to perform without contrast without documented contrast allergy.     If this is for a repeat diagnostic MBB:  [] Prior diagnostic MBB provided >80% pain relief and/or function    2. Increase gabapentin to 600/300/600 mg daily   3. Continue tizanidine 2-4 mg tid prn  4. Continue PT and HEP       Complete risks and  benefits including bleeding, infection, tissue reaction, nerve injury and allergic reaction were discussed. The approach was demonstrated using models and literature was provided. Verbal and written consent was obtained.    My impressions and treatment recommendations were discussed in detail with the patient who verbalized understanding and had no further questions.  Discharge instructions were provided. I personally saw and examined the patient and I agree with the above discussed plan of care.    No orders of the defined types were placed in this encounter.    New Medications Ordered This Visit   Medications   • methylprednisolone (MEDROL) 4 mg tablet     Sig: Follow package directions   • Qulipta 60 MG TABS     Sig: Take 1 tablet by mouth daily   • gabapentin (NEURONTIN) 300 mg capsule     Sig: Take 600 mg in am and pm and 300 mg in afternoon.     Dispense:  150 capsule     Refill:  1       History of Present Illness:  Kamran Rees is a 48 y.o. female who presents for a follow up office visit in regards to Neck Pain and Headache.         Patient presents as a follow-up visit after last being seen on 2/26/2024 for symptoms of occipital neuralgia , Neck pain and low back pain.  At that time patient was continued on tizanidine 2 to 4 mg 3 times daily as needed and started on gabapentin 300 mg 3 times daily.  Patient was also counseled to start physical therapy and continue with neurology with 104 Qulipta.  Since that time patient symptoms are the same rating her pain as a 5 out of 10 on numeric rating scale.  The pain is worse in the evening and the pain is constant, occurring 100%  at a time.  The quality pain is dull aching, sharp, throbbing, pressure-like primarily in her neck radiating up into her skull her right knee and also in her axial lower back.  Patient does not believe she is obtaining significant benefit from her current pain regimen though she does report 20% relief.  She denies any side effects.    I  have personally reviewed and/or updated the patient's past medical history, past surgical history, family history, social history, current medications, allergies, and vital signs today.     Review of Systems   Constitutional:  Negative for unexpected weight change.   Eyes:  Negative for visual disturbance.   Respiratory:  Negative for shortness of breath.    Cardiovascular:  Negative for palpitations.   Gastrointestinal:  Positive for nausea. Negative for constipation.   Endocrine: Negative for polyphagia.   Genitourinary:  Negative for frequency.   Musculoskeletal:  Positive for arthralgias, back pain, gait problem, neck pain and neck stiffness. Negative for myalgias.   Skin:  Negative for rash.   Neurological:  Positive for dizziness, numbness and headaches. Negative for weakness.   Hematological:  Does not bruise/bleed easily.   Psychiatric/Behavioral:  Positive for confusion and sleep disturbance. The patient is not nervous/anxious.        Patient Active Problem List   Diagnosis   • Obesity (BMI 30.0-34.9)   • Mild intermittent asthma without complication   • History of hysterectomy   • Thyroid fullness   • Vertigo   • Vitamin D deficiency   • Postsurgical malabsorption   • Chronic headaches   • Neck stiffness   • S/P laparoscopic sleeve gastrectomy   • Anxiety   • Asthma   • Chronic fatigue syndrome   • Chronic seasonal allergic rhinitis   • Congenital pes planus of right foot   • Primary osteoarthritis of right knee   • Moderate obstructive sleep apnea   • Moderate persistent asthma without complication   • Plantar fibromatosis   • Primary hypertension   • S/P right knee arthroscopy   • Subluxation of right patella   • Tendinitis of right hand   • Rash and other nonspecific skin eruption   • Bilateral occipital neuralgia   • Cervicogenic headache   • Cervical radiculopathy       Past Medical History:   Diagnosis Date   • Acid reflux    • Asthma    • Baker cyst, left    • Constipation    • Cough    • CPAP  (continuous positive airway pressure) dependence    • Digestive disorder    • Fatigue    • Headache    • Headache(784.0)    • History of mammogram 07/17/2018   • Increased urinary frequency    • Joint pain    • Migraine    • Obesity (BMI 30-39.9)    • Postgastrectomy malabsorption    • Seasonal allergies    • Shortness of breath    • Sleep apnea    • Sleep difficulties    • Vertigo        Past Surgical History:   Procedure Laterality Date   • CARPAL TUNNEL RELEASE     • EGD     • ENDOMETRIAL ABLATION W/ NOVASURE  2014    Laparscopy, D&C, Novasure ablation   • EPIDURAL BLOCK INJECTION N/A 2/2/2024    Procedure: C7-T1 CERVICAL EPIDURAL STEROID INJECTION;  Surgeon: Ronni Landry MD;  Location: [facility] MAIN OR;  Service: Pain Management    • FOOT SURGERY     • HYSTERECTOMY  12/20/2016    B/L salpingectomy   • KNEE SURGERY     • PARTIAL HYSTERECTOMY     • IN LAPS GSTRC RSTRICTIV PX LONGITUDINAL GASTRECTOMY N/A 8/4/2020    Procedure: LAPAROSCOPIC SLEEVE  GASTRECTOMY;  Surgeon: Gerson Engel MD;  Location: [facility] MAIN OR;  Service: Bariatrics   • TENDON REPAIR  2016   • TUBAL LIGATION  08/06/2010    Laparoscopy tubal cautery       Family History   Problem Relation Age of Onset   • No Known Problems Mother    • Canavan disease Father    • Cancer Father    • Breast cancer Maternal Grandmother    • Breast cancer Paternal Grandmother        Social History     Occupational History   • Occupation: Unemployed    Tobacco Use   • Smoking status: Never   • Smokeless tobacco: Never   Vaping Use   • Vaping status: Never Used   Substance and Sexual Activity   • Alcohol use: Yes     Comment: Occasionally   • Drug use: No   • Sexual activity: Yes     Partners: Male     Birth control/protection: Female Sterilization       Current Outpatient Medications on File Prior to Visit   Medication Sig   • azelastine (ASTELIN) 0.1 % nasal spray 1 spray into each nostril 2 (two) times a day Use in each nostril as directed   • budesonide-formoterol  "(SYMBICORT) 80-4.5 MCG/ACT inhaler    • clonazePAM (KlonoPIN) 0.5 mg tablet Take 0.5 mg by mouth 2 (two) times a day   • fluticasone (FLONASE) 50 mcg/act nasal spray 1 spray into each nostril 2 (two) times a day   • ipratropium-albuterol (DUO-NEB) 0.5-2.5 mg/3 mL nebulizer solution INHALE 3 ML BY NEBULIZATION EVERY 4 HOURS AS NEEDED FOR WHEEZING   • losartan-hydrochlorothiazide (HYZAAR) 50-12.5 mg per tablet Take 1 tablet by mouth daily   • methylprednisolone (MEDROL) 4 mg tablet Follow package directions   • Multiple Vitamin (multivitamin) tablet Take 1 tablet by mouth daily Bariatric   • Qulipta 60 MG TABS Take 1 tablet by mouth daily   • tiZANidine (ZANAFLEX) 2 mg tablet TAKE 1 TABLET(2 MG) BY MOUTH TWICE DAILY AS NEEDED FOR MUSCLE SPASMS   • triamcinolone (KENALOG) 0.025 % cream Apply topically 2 (two) times a day   • [DISCONTINUED] gabapentin (NEURONTIN) 300 mg capsule START WITH ONE CAPSULE BY MOUTH NIGHTLY FOR 7 DAYS. INCREASE TO 1 CAPSULE TWICE DAILY FOR 7 DAYS AND THEN INCREASE TO 1 CAPSULE THREE TIMES DAILY   • meclizine (ANTIVERT) 25 mg tablet Take 25 mg by mouth 2 (two) times a day as needed   • topiramate (TOPAMAX) 25 mg sprinkle capsule Take 100 mg by mouth 2 (two) times a day (Patient not taking: Reported on 1/25/2024)     No current facility-administered medications on file prior to visit.       Allergies   Allergen Reactions   • Cholestyramine Other (See Comments)     Other reaction(s): Cough   Seasonal Allergies   • Meloxicam Nausea Only   • Seasonal Ic [Cholestatin] Cough     Seasonal Allergies         Physical Exam:    /80   Pulse 80   Ht 5' 2\" (1.575 m)   Wt 79.5 kg (175 lb 3.2 oz)   BMI 32.04 kg/m²     Constitutional:normal, well developed, well nourished, alert, in no distress and non-toxic and no overt pain behavior.  Eyes:anicteric  HEENT:grossly intact  Neck:supple, symmetric, trachea midline and no masses   Pulmonary:even and unlabored  Cardiovascular:No edema or pitting edema " present  Skin:Normal without rashes or lesions and well hydrated  Psychiatric:Mood and affect appropriate  Neurologic:Cranial Nerves II-XII grossly intact  Musculoskeletal:normal gait. TTP in cervical spine with pain with extension and rotation of the cervical spine.     Imaging

## 2024-04-03 ENCOUNTER — TELEPHONE (OUTPATIENT)
Dept: PAIN MEDICINE | Facility: CLINIC | Age: 49
End: 2024-04-03

## 2024-04-03 NOTE — TELEPHONE ENCOUNTER
Schedule patient for MBB #1 04/12/2024  No as needed pain meds for 6 hrs before and 6/8 hrs after procedure  Pain level must be 5 or greater  Need to arrange transportation  Proper clothing for procedure  If ill or placed on antibiotics please call to reschedule

## 2024-04-12 ENCOUNTER — APPOINTMENT (OUTPATIENT)
Dept: RADIOLOGY | Facility: HOSPITAL | Age: 49
End: 2024-04-12
Payer: COMMERCIAL

## 2024-04-12 ENCOUNTER — HOSPITAL ENCOUNTER (OUTPATIENT)
Facility: AMBULARY SURGERY CENTER | Age: 49
Setting detail: OUTPATIENT SURGERY
Discharge: HOME | End: 2024-04-12
Attending: STUDENT IN AN ORGANIZED HEALTH CARE EDUCATION/TRAINING PROGRAM | Admitting: STUDENT IN AN ORGANIZED HEALTH CARE EDUCATION/TRAINING PROGRAM
Payer: COMMERCIAL

## 2024-04-12 VITALS
OXYGEN SATURATION: 100 % | TEMPERATURE: 98 F | HEART RATE: 75 BPM | SYSTOLIC BLOOD PRESSURE: 139 MMHG | DIASTOLIC BLOOD PRESSURE: 97 MMHG | RESPIRATION RATE: 18 BRPM

## 2024-04-12 PROBLEM — M47.812 CERVICAL SPONDYLOSIS: Status: ACTIVE | Noted: 2024-04-12

## 2024-04-12 PROCEDURE — 64490 INJ PARAVERT F JNT C/T 1 LEV: CPT | Performed by: STUDENT IN AN ORGANIZED HEALTH CARE EDUCATION/TRAINING PROGRAM

## 2024-04-12 PROCEDURE — NC001 PR NO CHARGE: Performed by: STUDENT IN AN ORGANIZED HEALTH CARE EDUCATION/TRAINING PROGRAM

## 2024-04-12 PROCEDURE — 64491 INJ PARAVERT F JNT C/T 2 LEV: CPT | Performed by: STUDENT IN AN ORGANIZED HEALTH CARE EDUCATION/TRAINING PROGRAM

## 2024-04-12 RX ORDER — LIDOCAINE HYDROCHLORIDE 10 MG/ML
INJECTION, SOLUTION EPIDURAL; INFILTRATION; INTRACAUDAL; PERINEURAL AS NEEDED
Status: DISCONTINUED | OUTPATIENT
Start: 2024-04-12 | End: 2024-04-12 | Stop reason: HOSPADM

## 2024-04-12 RX ORDER — BUPIVACAINE HYDROCHLORIDE 2.5 MG/ML
INJECTION, SOLUTION EPIDURAL; INFILTRATION; INTRACAUDAL AS NEEDED
Status: DISCONTINUED | OUTPATIENT
Start: 2024-04-12 | End: 2024-04-12 | Stop reason: HOSPADM

## 2024-04-12 RX ORDER — GABAPENTIN 300 MG/1
300 CAPSULE ORAL 3 TIMES DAILY
COMMUNITY

## 2024-04-12 NOTE — OP NOTE
Pre-procedure Diagnosis: Cervical Facet Arthropathy  Post-procedure Diagnosis: Cervical Facet Arthropathy  Procedure Title(s):  bilateral TON, C3, C4 medial branch nerve blocks diagnostic  Attending Surgeon:   Ronni Landry MD  Anesthesia:   Local     Indications: The patient is a 48 y.o. year-old female with a diagnosis of cervical facet arthropathy. The patient's history and physical exam were reviewed. The risks, benefits and alternatives to the procedure were discussed, and all questions were answered to the patient's satisfaction. The patient agreed to proceed, and written informed consent was obtained.    Procedure in Detail: The patient was positioned prone.  The skin was prepped in the usual sterile   manner.The fluoroscope was positioned to provide an AP view.  The TON, C3, C4 medial branch diagnostic injection was begun by anesthetizing the skin and soft tissues with 1 ml 1% lidocaine.  Using fluoroscopic guidance, a sterile 25 gauge 3.5 inch spinal needle was then positioned at the TON midpoint of the waist of the articular pillar.  Precise needle placement was confirmed by fluoroscopy in AP and lateral views.   The following solution was then injected : 0.5 mL of 0.5% bupivacaine (preservative free). This sequence was performed at each additional level. After negative aspiration was confirmed, 0.25% bupivacaine was injected at each level.    The needles were removed, and the patient's neck was cleaned. Bandages were placed over the points of needle insertion.    Disposition: The patient tolerated the procedure well, and there were no apparent complications. The patient was taken to the recovery area where written discharge instructions for the procedure were given. The patient was given a pain diary to determine if the patient's pain improves following the injection. Once the diary is returned we will consider next appropriate course of treatment.      Estimated Blood Loss: None  Specimens Obtained:  N/A

## 2024-04-23 ENCOUNTER — TELEPHONE (OUTPATIENT)
Dept: PAIN MEDICINE | Facility: CLINIC | Age: 49
End: 2024-04-23

## 2024-04-23 NOTE — TELEPHONE ENCOUNTER
Caller: Kamran    Doctor: Frantz    Reason for call: patient calling to find out how long will it take to schedule of nerve block after 4/26 because she wants to schedule Knee surgery has broken bone in her knee what is time frame she should wait after nerve block to schedule knee surgery?    Call back#: [phone]

## 2024-04-23 NOTE — TELEPHONE ENCOUNTER
S/w pt, notes her knee can wait. She was lokking to see when her RFA would be scheduled.  I advised as soon as she gets her diary back and its reviewed the  will call her to arrange based on 's availability

## 2024-04-26 ENCOUNTER — HOSPITAL ENCOUNTER (OUTPATIENT)
Facility: AMBULARY SURGERY CENTER | Age: 49
Setting detail: OUTPATIENT SURGERY
Discharge: HOME | End: 2024-04-26
Attending: STUDENT IN AN ORGANIZED HEALTH CARE EDUCATION/TRAINING PROGRAM | Admitting: STUDENT IN AN ORGANIZED HEALTH CARE EDUCATION/TRAINING PROGRAM
Payer: COMMERCIAL

## 2024-04-26 ENCOUNTER — APPOINTMENT (OUTPATIENT)
Dept: RADIOLOGY | Facility: HOSPITAL | Age: 49
End: 2024-04-26
Payer: COMMERCIAL

## 2024-04-26 VITALS
SYSTOLIC BLOOD PRESSURE: 146 MMHG | OXYGEN SATURATION: 98 % | HEART RATE: 72 BPM | TEMPERATURE: 97.7 F | DIASTOLIC BLOOD PRESSURE: 94 MMHG | RESPIRATION RATE: 18 BRPM

## 2024-04-26 PROCEDURE — 64491 INJ PARAVERT F JNT C/T 2 LEV: CPT | Performed by: STUDENT IN AN ORGANIZED HEALTH CARE EDUCATION/TRAINING PROGRAM

## 2024-04-26 PROCEDURE — NC001 PR NO CHARGE: Performed by: STUDENT IN AN ORGANIZED HEALTH CARE EDUCATION/TRAINING PROGRAM

## 2024-04-26 PROCEDURE — 64490 INJ PARAVERT F JNT C/T 1 LEV: CPT | Performed by: STUDENT IN AN ORGANIZED HEALTH CARE EDUCATION/TRAINING PROGRAM

## 2024-04-26 RX ORDER — BUPIVACAINE HYDROCHLORIDE 5 MG/ML
INJECTION, SOLUTION EPIDURAL; INTRACAUDAL AS NEEDED
Status: DISCONTINUED | OUTPATIENT
Start: 2024-04-26 | End: 2024-04-26 | Stop reason: HOSPADM

## 2024-04-26 RX ORDER — LIDOCAINE HYDROCHLORIDE 10 MG/ML
INJECTION, SOLUTION EPIDURAL; INFILTRATION; INTRACAUDAL; PERINEURAL AS NEEDED
Status: DISCONTINUED | OUTPATIENT
Start: 2024-04-26 | End: 2024-04-26 | Stop reason: HOSPADM

## 2024-04-26 NOTE — OP NOTE
Pre-procedure Diagnosis: Cervical Facet Arthropathy  Post-procedure Diagnosis: Cervical Facet Arthropathy  Procedure Title(s):  bilateral TON, C3, C4 medial branch nerve blocks #2  Attending Surgeon:   Ronni Landry MD  Anesthesia:   Local     Indications: The patient is a 48 y.o. year-old female with a diagnosis of cervical facet arthropathy. The patient's history and physical exam were reviewed. The risks, benefits and alternatives to the procedure were discussed, and all questions were answered to the patient's satisfaction. The patient agreed to proceed, and written informed consent was obtained.    Procedure in Detail: The patient was positioned prone.  The skin was prepped in the usual sterile   manner.The fluoroscope was positioned to provide an AP view.  The TON, C3, C4 medial branch diagnostic injection was begun by anesthetizing the skin and soft tissues with 1 ml 1% lidocaine.  Using fluoroscopic guidance, a sterile 25 gauge 3.5 inch spinal needle was then positioned at the TOn midpoint of the waist of the articular pillar.  Precise needle placement was confirmed by fluoroscopy in AP and lateral views.   The following solution was then injected : 0.5 mL of 0.5% bupivacaine (preservative free). This sequence was performed at each additional level.   The needles were removed, and the patient's neck was cleaned. Bandages were placed over the points of needle insertion.    Disposition: The patient tolerated the procedure well, and there were no apparent complications. The patient was taken to the recovery area where written discharge instructions for the procedure were given. The patient was given a pain diary to determine if the patient's pain improves following the injection. Once the diary is returned we will consider next appropriate course of treatment.      Estimated Blood Loss: None  Specimens Obtained: N/A

## 2024-04-26 NOTE — INTERVAL H&P NOTE
H&P reviewed. After examining the patient I find no changes in the patients condition since the H&P had been written.    Vitals:    04/26/24 0731   BP: 133/88   Pulse: 75   Resp: 18   Temp: 97.7 °F (36.5 °C)   SpO2: 98%

## 2024-04-26 NOTE — DISCHARGE INSTRUCTIONS

## 2024-04-29 NOTE — TELEPHONE ENCOUNTER
Caller:Kamran     Doctor: Frantz     Reason for call: Patient calling to advise she has an appt for surgery on May 9th and would like to schedule procedure before her surgery due to Recoup time please advise     Call back#: [phone]

## 2024-05-06 NOTE — TELEPHONE ENCOUNTER
Caller: Patient     Doctor/Office: Johnson    Call regarding :  Returning nurse call      Call was transferred to: Triage Nurse

## 2024-05-16 ENCOUNTER — HOSPITAL ENCOUNTER (OUTPATIENT)
Facility: AMBULARY SURGERY CENTER | Age: 49
Setting detail: OUTPATIENT SURGERY
Discharge: HOME | End: 2024-05-16
Attending: STUDENT IN AN ORGANIZED HEALTH CARE EDUCATION/TRAINING PROGRAM | Admitting: STUDENT IN AN ORGANIZED HEALTH CARE EDUCATION/TRAINING PROGRAM
Payer: COMMERCIAL

## 2024-05-16 ENCOUNTER — APPOINTMENT (OUTPATIENT)
Dept: RADIOLOGY | Facility: HOSPITAL | Age: 49
End: 2024-05-16
Payer: COMMERCIAL

## 2024-05-16 VITALS
TEMPERATURE: 96.4 F | RESPIRATION RATE: 18 BRPM | SYSTOLIC BLOOD PRESSURE: 137 MMHG | HEART RATE: 70 BPM | OXYGEN SATURATION: 100 % | DIASTOLIC BLOOD PRESSURE: 94 MMHG

## 2024-05-16 PROCEDURE — 64633 DESTROY CERV/THOR FACET JNT: CPT | Performed by: STUDENT IN AN ORGANIZED HEALTH CARE EDUCATION/TRAINING PROGRAM

## 2024-05-16 PROCEDURE — NC001 PR NO CHARGE: Performed by: STUDENT IN AN ORGANIZED HEALTH CARE EDUCATION/TRAINING PROGRAM

## 2024-05-16 PROCEDURE — 64634 DESTROY C/TH FACET JNT ADDL: CPT | Performed by: STUDENT IN AN ORGANIZED HEALTH CARE EDUCATION/TRAINING PROGRAM

## 2024-05-16 RX ORDER — METHYLPREDNISOLONE ACETATE 40 MG/ML
INJECTION, SUSPENSION INTRA-ARTICULAR; INTRALESIONAL; INTRAMUSCULAR; SOFT TISSUE AS NEEDED
Status: DISCONTINUED | OUTPATIENT
Start: 2024-05-16 | End: 2024-05-16 | Stop reason: HOSPADM

## 2024-05-16 RX ORDER — ASPIRIN 81 MG/1
800 TABLET, CHEWABLE ORAL DAILY
COMMUNITY

## 2024-05-16 RX ORDER — BUPIVACAINE HYDROCHLORIDE 2.5 MG/ML
INJECTION, SOLUTION EPIDURAL; INFILTRATION; INTRACAUDAL AS NEEDED
Status: DISCONTINUED | OUTPATIENT
Start: 2024-05-16 | End: 2024-05-16 | Stop reason: HOSPADM

## 2024-05-16 RX ORDER — LIDOCAINE HYDROCHLORIDE 10 MG/ML
INJECTION, SOLUTION EPIDURAL; INFILTRATION; INTRACAUDAL; PERINEURAL AS NEEDED
Status: DISCONTINUED | OUTPATIENT
Start: 2024-05-16 | End: 2024-05-16 | Stop reason: HOSPADM

## 2024-05-16 RX ORDER — LIDOCAINE HYDROCHLORIDE 20 MG/ML
INJECTION, SOLUTION EPIDURAL; INFILTRATION; INTRACAUDAL; PERINEURAL AS NEEDED
Status: DISCONTINUED | OUTPATIENT
Start: 2024-05-16 | End: 2024-05-16 | Stop reason: HOSPADM

## 2024-05-16 NOTE — OP NOTE
Pre-procedure Diagnosis: Cervical Facet Arthropathy  Post-procedure Diagnosis: Cervical Facet Arthropathy  Procedure Title(s):  1. Radiofrequency ablation of right TON, C3, C4 medial branch nerves      2. Intraoperative fluoroscopy  Attending Surgeon:   Ronni Landry MD  Anesthesia:   Local     Indications: The patient is a 48 y.o. year-old female with a diagnosis of cervical facet arthropathy. The patient's history and physical exam were reviewed. The patient has previously undergone diagnostic and confirmatory cervical medial branch blocks.  The risks, benefits and alternatives to the procedure were discussed, and all questions were answered to the patient's satisfaction. The patient agreed to proceed, and written informed consent was obtained.    Procedure in Detail: The patient was brought into the procedure room and placed in the prone position on the fluoroscopy table. The area of the cervical spine was prepped with chloraprep solution times two and draped in a sterile manner.    AP fluoroscopic views were used to identify and mague the mid articular pillars of the C2, C3, C4 levels on the right side. The skin and subcutaneous tissues in these areas were anesthetized with 1% lidocaine. A 20-gauge, 100mm length, 10mm active tip radiofrequency probe was advanced toward the targeted points until bone was contacted. At this point, lateral fluoroscopic views were obtained, and the needle tips were advanced to the centroid of the facets at each level.      Motor stimulation was performed at 2 Hz and 1.2 volts generating a twitch in the neck muscles with no motor activity in the upper extremities. This was repeated for each level.    1ml of 1% lidocaine was injected prior to lesioning, which was performed for 90 seconds at 90 degrees centigrade. Once the lesion was complete, 0.5 ml of a solution consisting of 4.5-mL 0.25% bupivacaine and 0.5-mL Depo-medrol (80mg/mL) was injected through each probe. The probes were  removed with a 1% lidocaine flush. The patient's neck was cleaned, and bandages were placed at the needle insertion sites.    The needles were removed, and the patient's neck was cleaned. Bandages were placed over the points of needle insertion.    Disposition: The patient tolerated the procedure well, and there were no apparent complications. The patient was taken to the recovery area where written discharge instructions for the procedure were given.     Estimated Blood Loss: None  Specimens Obtained: N/A

## 2024-05-16 NOTE — H&P
History of Present Illness: The patient is a 48 y.o. female who presents with complaints of neck pain.     Past Medical History:   Diagnosis Date    Acid reflux     Asthma     Baker cyst, left     Constipation     Cough     CPAP (continuous positive airway pressure) dependence     Digestive disorder     Fatigue     Headache     Headache(784.0)     History of mammogram 07/17/2018    Increased urinary frequency     Joint pain     Migraine     Obesity (BMI 30-39.9)     Postgastrectomy malabsorption     Seasonal allergies     Shortness of breath     Sleep apnea     Sleep difficulties     Vertigo        Past Surgical History:   Procedure Laterality Date    CARPAL TUNNEL RELEASE      EGD      ENDOMETRIAL ABLATION W/ NOVASURE  2014    Laparscopy, D&C, Novasure ablation    EPIDURAL BLOCK INJECTION N/A 2/2/2024    Procedure: C7-T1 CERVICAL EPIDURAL STEROID INJECTION;  Surgeon: Ronni Landry MD;  Location: [facility] MAIN OR;  Service: Pain Management     FOOT SURGERY      HYSTERECTOMY  12/20/2016    B/L salpingectomy    KNEE SURGERY      NERVE BLOCK N/A 4/12/2024    Procedure: TON C3 C4 MEDIAL BRANCH BLOCK #1;  Surgeon: Ronni Landry MD;  Location: [facility] MAIN OR;  Service: Pain Management     NERVE BLOCK N/A 4/26/2024    Procedure: TON C3 C4 MEDIAL BRANCH BLCOK;  Surgeon: Ronni Landry MD;  Location: [facility] MAIN OR;  Service: Pain Management     PARTIAL HYSTERECTOMY      VA LAPS GSTRC RSTRICTIV PX LONGITUDINAL GASTRECTOMY N/A 8/4/2020    Procedure: LAPAROSCOPIC SLEEVE  GASTRECTOMY;  Surgeon: Gerson Engel MD;  Location: [facility] MAIN OR;  Service: Bariatrics    TENDON REPAIR  2016    TUBAL LIGATION  08/06/2010    Laparoscopy tubal cautery       No current facility-administered medications for this encounter.    Allergies   Allergen Reactions    Cholestyramine Other (See Comments)     Other reaction(s): Cough   Seasonal Allergies    Meloxicam Nausea Only    Seasonal Ic [Cholestatin] Cough     Seasonal Allergies          Physical Exam:   Vitals:    05/16/24 0728   BP: 129/92   Pulse: 68   Resp: 18   Temp: (!) 96.4 °F (35.8 °C)   SpO2: 100%     General: Awake, Alert, Oriented x 3, Mood and affect appropriate  Respiratory: Respirations even and unlabored  Cardiovascular: Peripheral pulses intact; no edema  Musculoskeletal Exam: neck pain.     ASA Score: 2    Patient/Chart Verification  Patient ID Verified: Verbal, Armband  ID Band Applied: Yes  Consents Confirmed: Procedural  H&P( within 30 days) Verified: Yes  Interval H&P(within 24 hr) Complete (required for Outpatients and Surgery Admit only): Yes  Beta Blocker given : N/A  Pre-op Lab/Test Results Available: In chart  Pregnancy Lab Collected: N/A comment  Does Patient Have a Prosthetic Device/Implant: No    Assessment: Cervical spondylosis    Plan: Right TON, C3, C4 MB RFA

## 2024-05-16 NOTE — DISCHARGE INSTRUCTIONS

## 2024-05-16 NOTE — H&P (VIEW-ONLY)
History of Present Illness: The patient is a 48 y.o. female who presents with complaints of neck pain.     Past Medical History:   Diagnosis Date    Acid reflux     Asthma     Baker cyst, left     Constipation     Cough     CPAP (continuous positive airway pressure) dependence     Digestive disorder     Fatigue     Headache     Headache(784.0)     History of mammogram 07/17/2018    Increased urinary frequency     Joint pain     Migraine     Obesity (BMI 30-39.9)     Postgastrectomy malabsorption     Seasonal allergies     Shortness of breath     Sleep apnea     Sleep difficulties     Vertigo        Past Surgical History:   Procedure Laterality Date    CARPAL TUNNEL RELEASE      EGD      ENDOMETRIAL ABLATION W/ NOVASURE  2014    Laparscopy, D&C, Novasure ablation    EPIDURAL BLOCK INJECTION N/A 2/2/2024    Procedure: C7-T1 CERVICAL EPIDURAL STEROID INJECTION;  Surgeon: Ronni Landry MD;  Location: [facility] MAIN OR;  Service: Pain Management     FOOT SURGERY      HYSTERECTOMY  12/20/2016    B/L salpingectomy    KNEE SURGERY      NERVE BLOCK N/A 4/12/2024    Procedure: TON C3 C4 MEDIAL BRANCH BLOCK #1;  Surgeon: Ronni Landry MD;  Location: [facility] MAIN OR;  Service: Pain Management     NERVE BLOCK N/A 4/26/2024    Procedure: TON C3 C4 MEDIAL BRANCH BLCOK;  Surgeon: Ronni Landry MD;  Location: [facility] MAIN OR;  Service: Pain Management     PARTIAL HYSTERECTOMY      MT LAPS GSTRC RSTRICTIV PX LONGITUDINAL GASTRECTOMY N/A 8/4/2020    Procedure: LAPAROSCOPIC SLEEVE  GASTRECTOMY;  Surgeon: Gerson Engel MD;  Location: [facility] MAIN OR;  Service: Bariatrics    TENDON REPAIR  2016    TUBAL LIGATION  08/06/2010    Laparoscopy tubal cautery       No current facility-administered medications for this encounter.    Allergies   Allergen Reactions    Cholestyramine Other (See Comments)     Other reaction(s): Cough   Seasonal Allergies    Meloxicam Nausea Only    Seasonal Ic [Cholestatin] Cough     Seasonal Allergies          Physical Exam:   Vitals:    05/16/24 0728   BP: 129/92   Pulse: 68   Resp: 18   Temp: (!) 96.4 °F (35.8 °C)   SpO2: 100%     General: Awake, Alert, Oriented x 3, Mood and affect appropriate  Respiratory: Respirations even and unlabored  Cardiovascular: Peripheral pulses intact; no edema  Musculoskeletal Exam: neck pain.     ASA Score: 2    Patient/Chart Verification  Patient ID Verified: Verbal, Armband  ID Band Applied: Yes  Consents Confirmed: Procedural  H&P( within 30 days) Verified: Yes  Interval H&P(within 24 hr) Complete (required for Outpatients and Surgery Admit only): Yes  Beta Blocker given : N/A  Pre-op Lab/Test Results Available: In chart  Pregnancy Lab Collected: N/A comment  Does Patient Have a Prosthetic Device/Implant: No    Assessment: Cervical spondylosis    Plan: Right TON, C3, C4 MB RFA

## 2024-05-24 ENCOUNTER — TELEPHONE (OUTPATIENT)
Age: 49
End: 2024-05-24

## 2024-05-24 NOTE — TELEPHONE ENCOUNTER
Caller: Patient     Doctor:      Reason for call: Patient had injection on 5/16 and it was good the first 5 days but then the last 2 days she had burning sensation which has subsided but now she just has soreness to the touch in the neck and at the base of the skull.    She is looking to see if this is normal.     Please advise     Call back#: [phone]

## 2024-05-24 NOTE — TELEPHONE ENCOUNTER
S/w pt, reviewed message below. RN explained pt developing a neuritis post RFA, and suggested Tylenol and ice until soreness resolves. RN instructed to pt to call back if worsens.

## 2024-05-31 ENCOUNTER — APPOINTMENT (OUTPATIENT)
Dept: RADIOLOGY | Facility: HOSPITAL | Age: 49
End: 2024-05-31
Payer: COMMERCIAL

## 2024-05-31 ENCOUNTER — HOSPITAL ENCOUNTER (OUTPATIENT)
Facility: AMBULARY SURGERY CENTER | Age: 49
Setting detail: OUTPATIENT SURGERY
Discharge: HOME | End: 2024-05-31
Attending: STUDENT IN AN ORGANIZED HEALTH CARE EDUCATION/TRAINING PROGRAM | Admitting: STUDENT IN AN ORGANIZED HEALTH CARE EDUCATION/TRAINING PROGRAM
Payer: COMMERCIAL

## 2024-05-31 ENCOUNTER — TELEPHONE (OUTPATIENT)
Dept: PAIN MEDICINE | Facility: CLINIC | Age: 49
End: 2024-05-31

## 2024-05-31 VITALS
TEMPERATURE: 97.9 F | SYSTOLIC BLOOD PRESSURE: 115 MMHG | HEART RATE: 62 BPM | DIASTOLIC BLOOD PRESSURE: 81 MMHG | OXYGEN SATURATION: 100 % | RESPIRATION RATE: 16 BRPM

## 2024-05-31 PROCEDURE — 64634 DESTROY C/TH FACET JNT ADDL: CPT | Performed by: STUDENT IN AN ORGANIZED HEALTH CARE EDUCATION/TRAINING PROGRAM

## 2024-05-31 PROCEDURE — NC001 PR NO CHARGE: Performed by: STUDENT IN AN ORGANIZED HEALTH CARE EDUCATION/TRAINING PROGRAM

## 2024-05-31 PROCEDURE — 64633 DESTROY CERV/THOR FACET JNT: CPT | Performed by: STUDENT IN AN ORGANIZED HEALTH CARE EDUCATION/TRAINING PROGRAM

## 2024-05-31 RX ORDER — LIDOCAINE HYDROCHLORIDE 10 MG/ML
INJECTION, SOLUTION EPIDURAL; INFILTRATION; INTRACAUDAL; PERINEURAL AS NEEDED
Status: DISCONTINUED | OUTPATIENT
Start: 2024-05-31 | End: 2024-05-31 | Stop reason: HOSPADM

## 2024-05-31 RX ORDER — METHYLPREDNISOLONE ACETATE 40 MG/ML
INJECTION, SUSPENSION INTRA-ARTICULAR; INTRALESIONAL; INTRAMUSCULAR; SOFT TISSUE AS NEEDED
Status: DISCONTINUED | OUTPATIENT
Start: 2024-05-31 | End: 2024-05-31 | Stop reason: HOSPADM

## 2024-05-31 RX ORDER — LIDOCAINE HYDROCHLORIDE 20 MG/ML
INJECTION, SOLUTION EPIDURAL; INFILTRATION; INTRACAUDAL; PERINEURAL AS NEEDED
Status: DISCONTINUED | OUTPATIENT
Start: 2024-05-31 | End: 2024-05-31 | Stop reason: HOSPADM

## 2024-05-31 RX ORDER — BUPIVACAINE HYDROCHLORIDE 2.5 MG/ML
INJECTION, SOLUTION EPIDURAL; INFILTRATION; INTRACAUDAL AS NEEDED
Status: DISCONTINUED | OUTPATIENT
Start: 2024-05-31 | End: 2024-05-31 | Stop reason: HOSPADM

## 2024-05-31 NOTE — TELEPHONE ENCOUNTER
Patient is S/P a Left C3-C4 RFA c/ SJ on 5/31/24.  She will need a 6 week F/U to be scheduled.  Please call on 6/3/24 post RFA. thanks

## 2024-05-31 NOTE — DISCHARGE INSTRUCTIONS

## 2024-05-31 NOTE — OP NOTE
Pre-procedure Diagnosis: Cervical Facet Arthropathy  Post-procedure Diagnosis: Cervical Facet Arthropathy  Procedure Title(s):  1. Radiofrequency ablation of left TON, C3, C4 medial branch nerves      2. Intraoperative fluoroscopy  Attending Surgeon:   Ronni Landry MD  Anesthesia:   Local     Indications: The patient is a 48 y.o. year-old female with a diagnosis of cervical facet arthropathy. The patient's history and physical exam were reviewed. The patient has previously undergone diagnostic and confirmatory cervical medial branch blocks.  The risks, benefits and alternatives to the procedure were discussed, and all questions were answered to the patient's satisfaction. The patient agreed to proceed, and written informed consent was obtained.    Procedure in Detail: The patient was brought into the procedure room and placed in the prone position on the fluoroscopy table. The area of the cervical spine was prepped with chloraprep solution times two and draped in a sterile manner.    AP fluoroscopic views were used to identify and mague the mid articular pillars of the TON, C3, C4 levels on the left side. The skin and subcutaneous tissues in these areas were anesthetized with 1% lidocaine. A 20-gauge, 100mm length, 10mm active tip radiofrequency probe was advanced toward the targeted points until bone was contacted. At this point, lateral fluoroscopic views were obtained, and the needle tips were advanced to the centroid of the facets at each level.      Motor stimulation was performed at 2 Hz and 1.2 volts generating a twitch in the neck muscles with no motor activity in the upper extremities. This was repeated for each level.    1ml of 1% lidocaine was injected prior to lesioning, which was performed for 90 seconds at 90 degrees centigrade. Once the lesion was complete, 0.5 ml of a solution consisting of 4.5-mL 0.25% bupivacaine and 0.5-mL Depo-medrol (80mg/mL) was injected through each probe. The probes were  removed with a 1% lidocaine flush. The patient's neck was cleaned, and bandages were placed at the needle insertion sites.    The needles were removed, and the patient's neck was cleaned. Bandages were placed over the points of needle insertion.    Disposition: The patient tolerated the procedure well, and there were no apparent complications. The patient was taken to the recovery area where written discharge instructions for the procedure were given.     Estimated Blood Loss: None  Specimens Obtained: N/A

## 2024-05-31 NOTE — INTERVAL H&P NOTE
H&P reviewed. After examining the patient I find no changes in the patients condition since the H&P had been written.    Vitals:    05/31/24 1346   BP: 111/75   Pulse: 64   Resp: 16   Temp: 97.9 °F (36.6 °C)   SpO2: 97%

## 2024-06-03 DIAGNOSIS — G44.86 CERVICOGENIC HEADACHE: Primary | ICD-10-CM

## 2024-06-03 RX ORDER — METHYLPREDNISOLONE 4 MG/1
TABLET ORAL
Qty: 1 EACH | Refills: 0 | Status: SHIPPED | OUTPATIENT
Start: 2024-06-03

## 2024-06-03 NOTE — TELEPHONE ENCOUNTER
S/W pt. Pt stated current pain level is 2/10. Pt stated needle sites look good, denies S&S of infection, denies fevers, has soreness and denies sun burn like sensation.  Advised pt if she does get pain to take her prescribed or OTC pain medications and/or use ice/heat and that it takes 4 to 6 weeks to see the full effect.  Scheduled next appt for 7/3 at 3:30 w/ SJ.  Pt verbalized understanding.    Pt stated her right side is still sore from the procedure on 5/16 and has some burning at times.  Advised it takes 4-6 weeks to see the full effect.  She stated her skin feels hot sometimes.  She stated the sites are healed.  Please advise.

## 2024-06-26 ENCOUNTER — TELEPHONE (OUTPATIENT)
Dept: PAIN MEDICINE | Facility: CLINIC | Age: 49
End: 2024-06-26

## 2024-06-26 NOTE — TELEPHONE ENCOUNTER
LVM to have pt be seen by Antonia Puente and off of [facility]  schedule for this day . Please help her reschedule to [facility]. Thank you

## 2024-07-11 ENCOUNTER — OFFICE VISIT (OUTPATIENT)
Dept: PAIN MEDICINE | Facility: CLINIC | Age: 49
End: 2024-07-11
Payer: COMMERCIAL

## 2024-07-11 VITALS
DIASTOLIC BLOOD PRESSURE: 76 MMHG | WEIGHT: 175 LBS | BODY MASS INDEX: 32.2 KG/M2 | HEART RATE: 81 BPM | SYSTOLIC BLOOD PRESSURE: 112 MMHG | HEIGHT: 62 IN

## 2024-07-11 DIAGNOSIS — G44.229 CHRONIC TENSION-TYPE HEADACHE, NOT INTRACTABLE: ICD-10-CM

## 2024-07-11 DIAGNOSIS — G44.86 CERVICOGENIC HEADACHE: ICD-10-CM

## 2024-07-11 DIAGNOSIS — Z98.84 S/P LAPAROSCOPIC SLEEVE GASTRECTOMY: ICD-10-CM

## 2024-07-11 DIAGNOSIS — M47.812 CERVICAL SPONDYLOSIS: Primary | ICD-10-CM

## 2024-07-11 PROCEDURE — 99214 OFFICE O/P EST MOD 30 MIN: CPT | Performed by: STUDENT IN AN ORGANIZED HEALTH CARE EDUCATION/TRAINING PROGRAM

## 2024-07-11 RX ORDER — TIZANIDINE 2 MG/1
2 TABLET ORAL 2 TIMES DAILY PRN
Qty: 60 TABLET | Refills: 0 | Status: SHIPPED | OUTPATIENT
Start: 2024-07-11

## 2024-07-11 NOTE — PROGRESS NOTES
Assessment  1. Cervical spondylosis    2. Cervicogenic headache    3. Chronic tension-type headache, not intractable    4. S/P laparoscopic sleeve gastrectomy    Patient presents as a follow-up visit after last being seen on 5/31/2024 where she underwent left third occipital nerve, C3, C4 medial branch radiofrequency ablation.  Patient since that time her symptoms are better rating her pain as a 3 out of 10 on numeric rating scale.  She is getting ongoing relief from the ablation procedure.  She has he still has some burning sensation at the base of her skull otherwise her symptoms are much improved.  She states that tizanidine does continue to help especially at nighttime she is on Emgality from her neurologist for her headaches which does help.  Says her symptoms are worse in the morning and the pain is intermittent.  He describes pain as a dull aching, throbbing pain again at the base of the skull.  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  She states she did have burning sensation for the first 3 weeks after her ablation procedure but that did not go well.  Patient is interested in medication management.     Patient inquired about low dose Norco for her pain, would not offer this for her pain.     Plan  Continue tizanidine 2 mg BID prn  Improvement in pain and function after RFA   Follow up as needed      Complete risks and benefits including bleeding, infection, tissue reaction, nerve injury and allergic reaction were discussed. The approach was demonstrated using models and literature was provided. Verbal and written consent was obtained.    My impressions and treatment recommendations were discussed in detail with the patient who verbalized understanding and had no further questions.  Discharge instructions were provided. I personally saw and examined the patient and I agree with the above discussed plan of  care.    No orders of the defined types were placed in this encounter.    New Medications Ordered This Visit   Medications    Calcium-Vitamin D-Vitamin K 650-12.5-40 MG-MCG-MCG CHEW     Sig: Chew 1 tablet    tiZANidine (ZANAFLEX) 2 mg tablet     Sig: Take 1 tablet (2 mg total) by mouth 2 (two) times a day as needed for muscle spasms     Dispense:  60 tablet     Refill:  0       History of Present Illness    Kamran Rees is a 48 y.o. female   Patient presents as a follow-up visit after last being seen on 5/31/2024 where she underwent left third occipital nerve, C3, C4 medial branch radiofrequency ablation.  Patient since that time her symptoms are better rating her pain as a 3 out of 10 on numeric rating scale.  She is getting ongoing relief from the ablation procedure.  She has he still has some burning sensation at the base of her skull otherwise her symptoms are much improved.  She states that tizanidine does continue to help especially at nighttime she is on Emgality from her neurologist for her headaches which does help.  Says her symptoms are worse in the morning and the pain is intermittent.  He describes pain as a dull aching, throbbing pain again at the base of the skull.  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  She states she did have burning sensation for the first 3 weeks after her ablation procedure but that did not go well.  Patient is interested in medication management.     I have personally reviewed and/or updated the patient's past medical history, past surgical history, family history, social history, current medications, allergies, and vital signs today.     Review of Systems    Patient Active Problem List   Diagnosis    Obesity (BMI 30.0-34.9)    Mild intermittent asthma without complication    History of hysterectomy    Thyroid fullness    Vertigo    Vitamin D deficiency    Postsurgical  malabsorption    Chronic headaches    Neck stiffness    S/P laparoscopic sleeve gastrectomy    Anxiety    Asthma    Chronic fatigue syndrome    Chronic seasonal allergic rhinitis    Congenital pes planus of right foot    Primary osteoarthritis of right knee    Moderate obstructive sleep apnea    Moderate persistent asthma without complication    Plantar fibromatosis    Primary hypertension    S/P right knee arthroscopy    Subluxation of right patella    Tendinitis of right hand    Rash and other nonspecific skin eruption    Bilateral occipital neuralgia    Cervicogenic headache    Cervical radiculopathy    Cervical spondylosis       Past Medical History:   Diagnosis Date    Acid reflux     Asthma     Baker cyst, left     Constipation     Cough     CPAP (continuous positive airway pressure) dependence     Digestive disorder     Fatigue     Headache     Headache(784.0)     History of mammogram 07/17/2018    Increased urinary frequency     Joint pain     Migraine     Obesity (BMI 30-39.9)     Postgastrectomy malabsorption     Seasonal allergies     Shortness of breath     Sleep apnea     Sleep difficulties     Vertigo        Past Surgical History:   Procedure Laterality Date    CARPAL TUNNEL RELEASE      EGD      ENDOMETRIAL ABLATION W/ NOVASURE  2014    Laparscopy, D&C, Novasure ablation    EPIDURAL BLOCK INJECTION N/A 2/2/2024    Procedure: C7-T1 CERVICAL EPIDURAL STEROID INJECTION;  Surgeon: Ronni Landry MD;  Location: [facility] MAIN OR;  Service: Pain Management     FOOT SURGERY      HYSTERECTOMY  12/20/2016    B/L salpingectomy    KNEE SURGERY      NERVE BLOCK N/A 4/12/2024    Procedure: TON C3 C4 MEDIAL BRANCH BLOCK #1;  Surgeon: Ronni Landry MD;  Location: [facility] MAIN OR;  Service: Pain Management     NERVE BLOCK N/A 4/26/2024    Procedure: TON C3 C4 MEDIAL BRANCH BLCOK;  Surgeon: Ronni Landry MD;  Location: [facility] MAIN OR;  Service: Pain Management     PARTIAL HYSTERECTOMY      MS LAPS GSTRC RSTRICTIV PX  LONGITUDINAL GASTRECTOMY N/A 8/4/2020    Procedure: LAPAROSCOPIC SLEEVE  GASTRECTOMY;  Surgeon: Gerson Engel MD;  Location: [facility] MAIN OR;  Service: Bariatrics    RADIOFREQUENCY ABLATION Right 5/16/2024    Procedure: RIGHT TON C3 C4 RADIO FREQUENCY ABLATION;  Surgeon: Ronni Landry MD;  Location: [facility] MAIN OR;  Service: Pain Management     RADIOFREQUENCY ABLATION Left 5/31/2024    Procedure: LEFT TON C3 C4 RADIO FREQUENCY ABLATION;  Surgeon: Ronni Landry MD;  Location: [facility] MAIN OR;  Service: Pain Management     TENDON REPAIR  2016    TUBAL LIGATION  08/06/2010    Laparoscopy tubal cautery       Family History   Problem Relation Age of Onset    No Known Problems Mother     Canavan disease Father     Cancer Father     Breast cancer Maternal Grandmother     Breast cancer Paternal Grandmother        Social History     Occupational History    Occupation: Unemployed    Tobacco Use    Smoking status: Never    Smokeless tobacco: Never   Vaping Use    Vaping status: Never Used   Substance and Sexual Activity    Alcohol use: Yes     Comment: Occasionally    Drug use: No    Sexual activity: Yes     Partners: Male     Birth control/protection: Female Sterilization       Current Outpatient Medications on File Prior to Visit   Medication Sig    budesonide-formoterol (SYMBICORT) 80-4.5 MCG/ACT inhaler     Calcium-Vitamin D-Vitamin K 650-12.5-40 MG-MCG-MCG CHEW Chew 1 tablet    clonazePAM (KlonoPIN) 0.5 mg tablet Take 0.5 mg by mouth 2 (two) times a day    fluticasone (FLONASE) 50 mcg/act nasal spray 1 spray into each nostril 2 (two) times a day    ipratropium-albuterol (DUO-NEB) 0.5-2.5 mg/3 mL nebulizer solution INHALE 3 ML BY NEBULIZATION EVERY 4 HOURS AS NEEDED FOR WHEEZING    losartan-hydrochlorothiazide (HYZAAR) 50-12.5 mg per tablet Take 1 tablet by mouth daily    methylPREDNISolone 4 MG tablet therapy pack Use as directed on package    Multiple Vitamin (multivitamin) tablet Take 1 tablet by mouth daily Bariatric  "   [DISCONTINUED] tiZANidine (ZANAFLEX) 2 mg tablet TAKE 1 TABLET(2 MG) BY MOUTH TWICE DAILY AS NEEDED FOR MUSCLE SPASMS    aspirin 81 mg chewable tablet Chew 800 mg daily    azelastine (ASTELIN) 0.1 % nasal spray 1 spray into each nostril 2 (two) times a day Use in each nostril as directed    meclizine (ANTIVERT) 25 mg tablet Take 25 mg by mouth 2 (two) times a day as needed     No current facility-administered medications on file prior to visit.       Allergies   Allergen Reactions    Cholestyramine Other (See Comments)     Other reaction(s): Cough   Seasonal Allergies    Meloxicam Nausea Only    Seasonal Ic [Cholestatin] Cough     Seasonal Allergies         Physical Exam    /76   Pulse 81   Ht 5' 2\" (1.575 m)   Wt 79.4 kg (175 lb)   BMI 32.01 kg/m²     Constitutional: normal, well developed, well nourished, alert, in no distress and non-toxic and no overt pain behavior.  Eyes: anicteric  HEENT: grossly intact  Neck: supple, symmetric, trachea midline and no masses   Pulmonary:even and unlabored  Cardiovascular:No edema or pitting edema present  Skin:Normal without rashes or lesions and well hydrated  Psychiatric:Mood and affect appropriate  Neurologic:Cranial Nerves II-XII grossly intact  Musculoskeletal:normal gait.     Imaging    "

## 2024-07-12 ENCOUNTER — TELEPHONE (OUTPATIENT)
Dept: PAIN MEDICINE | Facility: CLINIC | Age: 49
End: 2024-07-12

## 2024-08-02 ENCOUNTER — HOSPITAL ENCOUNTER (EMERGENCY)
Facility: HOSPITAL | Age: 49
Discharge: HOME | End: 2024-08-02
Attending: EMERGENCY MEDICINE
Payer: COMMERCIAL

## 2024-08-02 VITALS
WEIGHT: 170 LBS | SYSTOLIC BLOOD PRESSURE: 124 MMHG | OXYGEN SATURATION: 100 % | RESPIRATION RATE: 18 BRPM | TEMPERATURE: 97.2 F | HEART RATE: 84 BPM | DIASTOLIC BLOOD PRESSURE: 63 MMHG | BODY MASS INDEX: 31.28 KG/M2 | HEIGHT: 62 IN

## 2024-08-02 DIAGNOSIS — R55 NEAR SYNCOPE: Primary | ICD-10-CM

## 2024-08-02 LAB
ALBUMIN SERPL BCG-MCNC: 3.8 G/DL (ref 3.5–5)
ALP SERPL-CCNC: 42 U/L (ref 34–104)
ALT SERPL W P-5'-P-CCNC: 8 U/L (ref 7–52)
ANION GAP SERPL CALCULATED.3IONS-SCNC: 8 MMOL/L (ref 4–13)
AST SERPL W P-5'-P-CCNC: 9 U/L (ref 13–39)
ATRIAL RATE: 77 BPM
BASOPHILS # BLD AUTO: 0.04 THOUSANDS/ÂΜL (ref 0–0.1)
BASOPHILS NFR BLD AUTO: 1 % (ref 0–1)
BILIRUB SERPL-MCNC: 0.61 MG/DL (ref 0.2–1)
BUN SERPL-MCNC: 13 MG/DL (ref 5–25)
CALCIUM SERPL-MCNC: 9.1 MG/DL (ref 8.4–10.2)
CARDIAC TROPONIN I PNL SERPL HS: <2 NG/L
CHLORIDE SERPL-SCNC: 105 MMOL/L (ref 96–108)
CO2 SERPL-SCNC: 26 MMOL/L (ref 21–32)
CREAT SERPL-MCNC: 0.82 MG/DL (ref 0.6–1.3)
EOSINOPHIL # BLD AUTO: 0.01 THOUSAND/ÂΜL (ref 0–0.61)
EOSINOPHIL NFR BLD AUTO: 0 % (ref 0–6)
ERYTHROCYTE [DISTWIDTH] IN BLOOD BY AUTOMATED COUNT: 11.9 % (ref 11.6–15.1)
GFR SERPL CREATININE-BSD FRML MDRD: 86 ML/MIN/1.73SQ M
GLUCOSE SERPL-MCNC: 86 MG/DL (ref 65–140)
HCT VFR BLD AUTO: 34.7 % (ref 34.8–46.1)
HGB BLD-MCNC: 12 G/DL (ref 11.5–15.4)
IMM GRANULOCYTES # BLD AUTO: 0.03 THOUSAND/UL (ref 0–0.2)
IMM GRANULOCYTES NFR BLD AUTO: 1 % (ref 0–2)
LYMPHOCYTES # BLD AUTO: 1.81 THOUSANDS/ÂΜL (ref 0.6–4.47)
LYMPHOCYTES NFR BLD AUTO: 30 % (ref 14–44)
MCH RBC QN AUTO: 31.9 PG (ref 26.8–34.3)
MCHC RBC AUTO-ENTMCNC: 34.6 G/DL (ref 31.4–37.4)
MCV RBC AUTO: 92 FL (ref 82–98)
MONOCYTES # BLD AUTO: 0.3 THOUSAND/ÂΜL (ref 0.17–1.22)
MONOCYTES NFR BLD AUTO: 5 % (ref 4–12)
NEUTROPHILS # BLD AUTO: 3.87 THOUSANDS/ÂΜL (ref 1.85–7.62)
NEUTS SEG NFR BLD AUTO: 63 % (ref 43–75)
NRBC BLD AUTO-RTO: 0 /100 WBCS
P AXIS: 66 DEGREES
PLATELET # BLD AUTO: 209 THOUSANDS/UL (ref 149–390)
PMV BLD AUTO: 10.2 FL (ref 8.9–12.7)
POTASSIUM SERPL-SCNC: 3.3 MMOL/L (ref 3.5–5.3)
PR INTERVAL: 168 MS
PROT SERPL-MCNC: 6.5 G/DL (ref 6.4–8.4)
QRS AXIS: 39 DEGREES
QRSD INTERVAL: 88 MS
QT INTERVAL: 384 MS
QTC INTERVAL: 434 MS
RBC # BLD AUTO: 3.76 MILLION/UL (ref 3.81–5.12)
SODIUM SERPL-SCNC: 139 MMOL/L (ref 135–147)
T WAVE AXIS: 35 DEGREES
VENTRICULAR RATE: 77 BPM
WBC # BLD AUTO: 6.06 THOUSAND/UL (ref 4.31–10.16)

## 2024-08-02 PROCEDURE — 36415 COLL VENOUS BLD VENIPUNCTURE: CPT

## 2024-08-02 PROCEDURE — 99284 EMERGENCY DEPT VISIT MOD MDM: CPT

## 2024-08-02 PROCEDURE — 96365 THER/PROPH/DIAG IV INF INIT: CPT

## 2024-08-02 PROCEDURE — 80053 COMPREHEN METABOLIC PANEL: CPT

## 2024-08-02 PROCEDURE — 85025 COMPLETE CBC W/AUTO DIFF WBC: CPT

## 2024-08-02 PROCEDURE — 84484 ASSAY OF TROPONIN QUANT: CPT

## 2024-08-02 PROCEDURE — 93005 ELECTROCARDIOGRAM TRACING: CPT

## 2024-08-02 PROCEDURE — 96375 TX/PRO/DX INJ NEW DRUG ADDON: CPT

## 2024-08-02 PROCEDURE — 93010 ELECTROCARDIOGRAM REPORT: CPT | Performed by: INTERNAL MEDICINE

## 2024-08-02 RX ORDER — DROPERIDOL 2.5 MG/ML
1 INJECTION, SOLUTION INTRAMUSCULAR; INTRAVENOUS ONCE
Status: COMPLETED | OUTPATIENT
Start: 2024-08-02 | End: 2024-08-02

## 2024-08-02 RX ORDER — METOCLOPRAMIDE HYDROCHLORIDE 5 MG/ML
10 INJECTION INTRAMUSCULAR; INTRAVENOUS ONCE
Status: COMPLETED | OUTPATIENT
Start: 2024-08-02 | End: 2024-08-02

## 2024-08-02 RX ORDER — SODIUM CHLORIDE, SODIUM GLUCONATE, SODIUM ACETATE, POTASSIUM CHLORIDE, MAGNESIUM CHLORIDE, SODIUM PHOSPHATE, DIBASIC, AND POTASSIUM PHOSPHATE .53; .5; .37; .037; .03; .012; .00082 G/100ML; G/100ML; G/100ML; G/100ML; G/100ML; G/100ML; G/100ML
1000 INJECTION, SOLUTION INTRAVENOUS ONCE
Status: COMPLETED | OUTPATIENT
Start: 2024-08-02 | End: 2024-08-02

## 2024-08-02 RX ORDER — DIPHENHYDRAMINE HYDROCHLORIDE 50 MG/ML
25 INJECTION INTRAMUSCULAR; INTRAVENOUS ONCE
Status: COMPLETED | OUTPATIENT
Start: 2024-08-02 | End: 2024-08-02

## 2024-08-02 RX ORDER — KETOROLAC TROMETHAMINE 30 MG/ML
15 INJECTION, SOLUTION INTRAMUSCULAR; INTRAVENOUS ONCE
Status: COMPLETED | OUTPATIENT
Start: 2024-08-02 | End: 2024-08-02

## 2024-08-02 RX ADMIN — KETOROLAC TROMETHAMINE 15 MG: 30 INJECTION, SOLUTION INTRAMUSCULAR; INTRAVENOUS at 10:40

## 2024-08-02 RX ADMIN — DIPHENHYDRAMINE HYDROCHLORIDE 25 MG: 50 INJECTION, SOLUTION INTRAMUSCULAR; INTRAVENOUS at 10:41

## 2024-08-02 RX ADMIN — SODIUM CHLORIDE, SODIUM GLUCONATE, SODIUM ACETATE, POTASSIUM CHLORIDE, MAGNESIUM CHLORIDE, SODIUM PHOSPHATE, DIBASIC, AND POTASSIUM PHOSPHATE 1000 ML: .53; .5; .37; .037; .03; .012; .00082 INJECTION, SOLUTION INTRAVENOUS at 10:38

## 2024-08-02 RX ADMIN — METOCLOPRAMIDE 10 MG: 5 INJECTION, SOLUTION INTRAMUSCULAR; INTRAVENOUS at 10:39

## 2024-08-02 NOTE — ED PROVIDER NOTES
History  Chief Complaint   Patient presents with    Dizziness     Pt was at work when became dizzy and almost passed out. Pt denies any chest pain. Pt states that she is being worked up for a neuro issue. Pt has been having dizziness off and on     48-year-old female presents ED for evaluation of near syncopal episode.  Patient states she was at work when she all of a sudden felt lightheaded and fell to the ground.  She states that she did head strike.  She did not have loss of consciousness.  Patient does not take any anticoagulation/antiplatelet medication.  She complains of associated headache, blurry vision, nausea.  She denies vomiting, fever, chills, chest pain, shortness of breath, palpitations, abdominal pain.  Patient states that she has had similar episodes in the past and that this feels similar.  She has been evaluated by neurology and has had workup including MRI.  She was recently prescribed gabapentin for vertebrobasilar migraine.        Prior to Admission Medications   Prescriptions Last Dose Informant Patient Reported? Taking?   Calcium-Vitamin D-Vitamin K 650-12.5-40 MG-MCG-MCG CHEW   Yes No   Sig: Chew 1 tablet   Multiple Vitamin (multivitamin) tablet  Self Yes No   Sig: Take 1 tablet by mouth daily Bariatric   aspirin 81 mg chewable tablet   Yes No   Sig: Chew 800 mg daily   azelastine (ASTELIN) 0.1 % nasal spray  Self No No   Si spray into each nostril 2 (two) times a day Use in each nostril as directed   budesonide-formoterol (SYMBICORT) 80-4.5 MCG/ACT inhaler  Self Yes No   clonazePAM (KlonoPIN) 0.5 mg tablet  Self Yes No   Sig: Take 0.5 mg by mouth 2 (two) times a day   fluticasone (FLONASE) 50 mcg/act nasal spray  Self No No   Si spray into each nostril 2 (two) times a day   ipratropium-albuterol (DUO-NEB) 0.5-2.5 mg/3 mL nebulizer solution   Yes No   Sig: INHALE 3 ML BY NEBULIZATION EVERY 4 HOURS AS NEEDED FOR WHEEZING   losartan-hydrochlorothiazide (HYZAAR) 50-12.5 mg per tablet   Self Yes No   Sig: Take 1 tablet by mouth daily   meclizine (ANTIVERT) 25 mg tablet   Yes No   Sig: Take 25 mg by mouth 2 (two) times a day as needed   methylPREDNISolone 4 MG tablet therapy pack   No No   Sig: Use as directed on package   tiZANidine (ZANAFLEX) 2 mg tablet   No No   Sig: Take 1 tablet (2 mg total) by mouth 2 (two) times a day as needed for muscle spasms      Facility-Administered Medications: None       Past Medical History:   Diagnosis Date    Acid reflux     Asthma     Baker cyst, left     Constipation     Cough     CPAP (continuous positive airway pressure) dependence     Digestive disorder     Fatigue     Headache     Headache(784.0)     History of mammogram 07/17/2018    Increased urinary frequency     Joint pain     Migraine     Obesity (BMI 30-39.9)     Postgastrectomy malabsorption     Seasonal allergies     Shortness of breath     Sleep apnea     Sleep difficulties     Vertigo        Past Surgical History:   Procedure Laterality Date    CARPAL TUNNEL RELEASE      EGD      ENDOMETRIAL ABLATION W/ NOVASURE  2014    Laparscopy, D&C, Novasure ablation    EPIDURAL BLOCK INJECTION N/A 2/2/2024    Procedure: C7-T1 CERVICAL EPIDURAL STEROID INJECTION;  Surgeon: Ronni Landry MD;  Location: [facility] MAIN OR;  Service: Pain Management     FOOT SURGERY      HYSTERECTOMY  12/20/2016    B/L salpingectomy    KNEE SURGERY      NERVE BLOCK N/A 4/12/2024    Procedure: TON C3 C4 MEDIAL BRANCH BLOCK #1;  Surgeon: Ronni Landry MD;  Location: [facility] MAIN OR;  Service: Pain Management     NERVE BLOCK N/A 4/26/2024    Procedure: TON C3 C4 MEDIAL BRANCH BLCOK;  Surgeon: Ronni Landry MD;  Location: [facility] MAIN OR;  Service: Pain Management     PARTIAL HYSTERECTOMY      AL LAPS GSTRC RSTRICTIV PX LONGITUDINAL GASTRECTOMY N/A 8/4/2020    Procedure: LAPAROSCOPIC SLEEVE  GASTRECTOMY;  Surgeon: Gesron Engel MD;  Location: [facility] MAIN OR;  Service: Bariatrics    RADIOFREQUENCY ABLATION Right 5/16/2024    Procedure:  RIGHT TON C3 C4 RADIO FREQUENCY ABLATION;  Surgeon: Ronni Landry MD;  Location: [facility] MAIN OR;  Service: Pain Management     RADIOFREQUENCY ABLATION Left 5/31/2024    Procedure: LEFT TON C3 C4 RADIO FREQUENCY ABLATION;  Surgeon: Ronni Landry MD;  Location: [facility] MAIN OR;  Service: Pain Management     TENDON REPAIR  2016    TUBAL LIGATION  08/06/2010    Laparoscopy tubal cautery       Family History   Problem Relation Age of Onset    No Known Problems Mother     Canavan disease Father     Cancer Father     Breast cancer Maternal Grandmother     Breast cancer Paternal Grandmother      I have reviewed and agree with the history as documented.    E-Cigarette/Vaping    E-Cigarette Use Never User      E-Cigarette/Vaping Substances    Nicotine No     THC No     CBD No     Flavoring No     Other No     Unknown No      Social History     Tobacco Use    Smoking status: Never    Smokeless tobacco: Never   Vaping Use    Vaping status: Never Used   Substance Use Topics    Alcohol use: Yes     Comment: Occasionally    Drug use: No        Review of Systems   Constitutional:  Negative for chills and fever.   Eyes:  Positive for visual disturbance. Negative for photophobia.   Respiratory:  Negative for shortness of breath.    Cardiovascular:  Negative for chest pain.   Gastrointestinal:  Positive for nausea. Negative for abdominal pain and vomiting.   Skin:  Negative for color change.   Neurological:  Positive for light-headedness and headaches. Negative for weakness and numbness.       Physical Exam  ED Triage Vitals   Temperature Pulse Respirations Blood Pressure SpO2   08/02/24 0955 08/02/24 0955 08/02/24 0955 08/02/24 0955 08/02/24 0955   (!) 97.2 °F (36.2 °C) 73 18 138/95 95 %      Temp Source Heart Rate Source Patient Position - Orthostatic VS BP Location FiO2 (%)   08/02/24 0955 08/02/24 1100 08/02/24 0955 08/02/24 0955 --   Oral Monitor Lying Right arm       Pain Score       08/02/24 0955       6              Orthostatic Vital Signs  Vitals:    08/02/24 0955 08/02/24 1100   BP: 138/95 124/63   Pulse: 73 84   Patient Position - Orthostatic VS: Lying Lying       Physical Exam  Vitals and nursing note reviewed.   Constitutional:       General: She is not in acute distress.     Appearance: Normal appearance. She is normal weight. She is not ill-appearing, toxic-appearing or diaphoretic.   HENT:      Head: Normocephalic and atraumatic.      Right Ear: External ear normal.      Left Ear: External ear normal.      Nose: No rhinorrhea.      Mouth/Throat:      Mouth: Mucous membranes are moist.   Eyes:      Extraocular Movements: Extraocular movements intact.      Conjunctiva/sclera: Conjunctivae normal.      Pupils: Pupils are equal, round, and reactive to light.   Cardiovascular:      Rate and Rhythm: Normal rate and regular rhythm.      Pulses: Normal pulses.      Heart sounds: Normal heart sounds.   Pulmonary:      Effort: Pulmonary effort is normal.      Breath sounds: Normal breath sounds.   Abdominal:      General: Abdomen is flat.      Palpations: Abdomen is soft.      Tenderness: There is no abdominal tenderness.   Musculoskeletal:      Cervical back: Neck supple. No tenderness.   Skin:     General: Skin is dry.      Capillary Refill: Capillary refill takes less than 2 seconds.   Neurological:      General: No focal deficit present.      Mental Status: She is alert and oriented to person, place, and time. Mental status is at baseline.      Cranial Nerves: No cranial nerve deficit.      Sensory: No sensory deficit.      Motor: No weakness.      Coordination: Coordination normal.   Psychiatric:         Mood and Affect: Mood normal.         Behavior: Behavior normal.         ED Medications  Medications   droperidol (FOR EMS ONLY) (INAPSINE) 2.5 mg/mL injection 2.5 mg (0 mg Does not apply Given to EMS 8/2/24 1119)   multi-electrolyte (ISOLYTE-S PH 7.4) bolus 1,000 mL (0 mL Intravenous Stopped 8/2/24 1150)   ketorolac  (TORADOL) injection 15 mg (15 mg Intravenous Given 8/2/24 1040)   diphenhydrAMINE (BENADRYL) injection 25 mg (25 mg Intravenous Given 8/2/24 1041)   metoclopramide (REGLAN) injection 10 mg (10 mg Intravenous Given 8/2/24 1039)       Diagnostic Studies  Results Reviewed       Procedure Component Value Units Date/Time    HS Troponin 0hr (reflex protocol) [[id number]]  (Normal) Collected: 08/02/24 1033    Lab Status: Final result Specimen: Blood from Arm, Left Updated: 08/02/24 1108     hs TnI 0hr <2 ng/L     Comprehensive metabolic panel [[id number]]  (Abnormal) Collected: 08/02/24 1033    Lab Status: Final result Specimen: Blood from Arm, Left Updated: 08/02/24 1103     Sodium 139 mmol/L      Potassium 3.3 mmol/L      Chloride 105 mmol/L      CO2 26 mmol/L      ANION GAP 8 mmol/L      BUN 13 mg/dL      Creatinine 0.82 mg/dL      Glucose 86 mg/dL      Calcium 9.1 mg/dL      AST 9 U/L      ALT 8 U/L      Alkaline Phosphatase 42 U/L      Total Protein 6.5 g/dL      Albumin 3.8 g/dL      Total Bilirubin 0.61 mg/dL      eGFR 86 ml/min/1.73sq m     Narrative:      National Kidney Disease Foundation guidelines for Chronic Kidney Disease (CKD):     Stage 1 with normal or high GFR (GFR > 90 mL/min/1.73 square meters)    Stage 2 Mild CKD (GFR = 60-89 mL/min/1.73 square meters)    Stage 3A Moderate CKD (GFR = 45-59 mL/min/1.73 square meters)    Stage 3B Moderate CKD (GFR = 30-44 mL/min/1.73 square meters)    Stage 4 Severe CKD (GFR = 15-29 mL/min/1.73 square meters)    Stage 5 End Stage CKD (GFR <15 mL/min/1.73 square meters)  Note: GFR calculation is accurate only with a steady state creatinine    CBC and differential [[id number]]  (Abnormal) Collected: 08/02/24 1033    Lab Status: Final result Specimen: Blood from Arm, Left Updated: 08/02/24 1041     WBC 6.06 Thousand/uL      RBC 3.76 Million/uL      Hemoglobin 12.0 g/dL      Hematocrit 34.7 %      MCV 92 fL      MCH 31.9 pg      MCHC 34.6 g/dL      RDW 11.9 %      MPV 10.2 fL       Platelets 209 Thousands/uL      nRBC 0 /100 WBCs      Segmented % 63 %      Immature Grans % 1 %      Lymphocytes % 30 %      Monocytes % 5 %      Eosinophils Relative 0 %      Basophils Relative 1 %      Absolute Neutrophils 3.87 Thousands/µL      Absolute Immature Grans 0.03 Thousand/uL      Absolute Lymphocytes 1.81 Thousands/µL      Absolute Monocytes 0.30 Thousand/µL      Eosinophils Absolute 0.01 Thousand/µL      Basophils Absolute 0.04 Thousands/µL                    No orders to display         Procedures  Procedures      ED Course  ED Course as of 08/02/24 1551   Fri Aug 02, 2024   1026 EKG read interpreted by me.  Rate 76.  Normal sinus rhythm.  Normal axis.  Normal intervals.  No ST elevation or depression.  No STEMI.                             SBIRT 20yo+      Flowsheet Row Most Recent Value   Initial Alcohol Screen: US AUDIT-C     1. How often do you have a drink containing alcohol? 0 Filed at: 08/02/2024 0958   2. How many drinks containing alcohol do you have on a typical day you are drinking?  1 Filed at: 08/02/2024 0958   3b. FEMALE Any Age, or MALE 65+: How often do you have 4 or more drinks on one occassion? 0 Filed at: 08/02/2024 0958   Audit-C Score 1 Filed at: 08/02/2024 0958   [place]: How many times in the past year have you...    Used an illegal drug or used a prescription medication for non-medical reasons? Never Filed at: 08/02/2024 0958                  Medical Decision Making  48-year-old female presents ED for evaluation of near syncopal episode with positive head strike.  Patient reports similar episodes in the past with neurology workup including MRI.  On exam, vitals unremarkable.  Patient is in no acute distress.  Neurologically, the patient is grossly intact.  Cranial nerves II through XII intact.  Pupils equal round reactive to light, extraocular muscles intact.  Coordination is normal of the bilateral upper and lower extremities.  Vision grossly intact.  Heart regular rate and  rhythm.  Lungs clear to auscultation bilaterally.  Head is normocephalic atraumatic  Differential diagnosis: I suspect migraine given patient's history of this.  I doubt arrhythmia, vasovagal syncope, PE based on lack shortness of breath, ACS, electrolyte abnormality.  Plan: Will give the patient a migraine cocktail to improve her symptoms.  Will order CBC, CMP, troponin, EKG. if above workup negative, will discharge to home.  Please ED course for additional information.    Amount and/or Complexity of Data Reviewed  Labs: ordered.    Risk  Prescription drug management.          Disposition  Final diagnoses:   Near syncope     Time reflects when diagnosis was documented in both MDM as applicable and the Disposition within this note       Time User Action Codes Description Comment    8/2/2024 11:43 AM Win Del Toro [R55] Near syncope           ED Disposition       ED Disposition   Discharge    Condition   Stable    Date/Time   Fri Aug 2, 2024 1143    Comment   Kamran Rees discharge to home/self care.                   Follow-up Information       Follow up With Specialties Details Why Contact Info    Lata Dodd MD Internal Medicine Schedule an appointment as soon as possible for a visit  As needed [address] [place] [id number]  [phone]              Discharge Medication List as of 8/2/2024 11:46 AM        CONTINUE these medications which have NOT CHANGED    Details   aspirin 81 mg chewable tablet Chew 800 mg daily, Historical Med      azelastine (ASTELIN) 0.1 % nasal spray 1 spray into each nostril 2 (two) times a day Use in each nostril as directed, Starting Wed 11/29/2023, Normal      budesonide-formoterol (SYMBICORT) 80-4.5 MCG/ACT inhaler Historical Med      Calcium-Vitamin D-Vitamin K 650-12.5-40 MG-MCG-MCG CHEW Chew 1 tablet, Historical Med      clonazePAM (KlonoPIN) 0.5 mg tablet Take 0.5 mg by mouth 2 (two) times a day, Historical Med      fluticasone (FLONASE) 50 mcg/act  nasal spray 1 spray into each nostril 2 (two) times a day, Starting Wed 11/29/2023, Normal      ipratropium-albuterol (DUO-NEB) 0.5-2.5 mg/3 mL nebulizer solution INHALE 3 ML BY NEBULIZATION EVERY 4 HOURS AS NEEDED FOR WHEEZING, Historical Med      losartan-hydrochlorothiazide (HYZAAR) 50-12.5 mg per tablet Take 1 tablet by mouth daily, Starting Mon 11/27/2023, Historical Med      meclizine (ANTIVERT) 25 mg tablet Take 25 mg by mouth 2 (two) times a day as needed, Starting Mon 10/5/2020, Until Fri 5/31/2024 at 2359, Historical Med      methylPREDNISolone 4 MG tablet therapy pack Use as directed on package, Normal      Multiple Vitamin (multivitamin) tablet Take 1 tablet by mouth daily Bariatric, Historical Med      tiZANidine (ZANAFLEX) 2 mg tablet Take 1 tablet (2 mg total) by mouth 2 (two) times a day as needed for muscle spasms, Starting Thu 7/11/2024, Normal           No discharge procedures on file.    PDMP Review         Value Time User    PDMP Reviewed  Yes 8/5/2020  8:34 AM Malou Garcia PA-C             ED Provider  Attending physically available and evaluated Kamran Rees. I managed the patient along with the ED Attending.    Electronically Signed by           Win Del Toro DO  08/02/24 8642

## 2024-08-02 NOTE — DISCHARGE INSTRUCTIONS
"You were evaluated in the emergency department after a near syncopal episode.  Your lab work was unremarkable.  We gave you a \"migraine cocktail including IV fluids, Benadryl, Toradol, Reglan.  Follow-up with your primary care doctor or your neurologist.  Return to the emergency department if you have another syncopal episode, or your symptoms acutely worsen.  "

## 2024-08-05 ENCOUNTER — TELEPHONE (OUTPATIENT)
Age: 49
End: 2024-08-05

## 2024-08-05 NOTE — TELEPHONE ENCOUNTER
PT CALLED WITH A REFERRAL TO NSX FROM [facility].    PT IS SCHED FOR CTH 8/9/24 SL AND MRI HEAD 8/12/24 SL    INSTRUCTED PT TO FAX BOTH MOST RECENT OFFICE VISIT AS WELL AS NSX REFERRAL TO OFFICE SO THAT INTAKE CAN BE COMPLETED AFTER IMAGING IS FINALIZED.    PT CONFIRMED OFFICE FAX AND WILL BE SENDING OVER DOCUMENTATION THIS WEEK.

## 2024-08-08 DIAGNOSIS — G44.86 CERVICOGENIC HEADACHE: ICD-10-CM

## 2024-08-08 RX ORDER — TIZANIDINE 2 MG/1
TABLET ORAL
Qty: 60 TABLET | Refills: 0 | Status: SHIPPED | OUTPATIENT
Start: 2024-08-08

## 2024-08-09 ENCOUNTER — HOSPITAL ENCOUNTER (OUTPATIENT)
Dept: CT IMAGING | Facility: HOSPITAL | Age: 49
End: 2024-08-09
Payer: COMMERCIAL

## 2024-08-09 DIAGNOSIS — R42 DIZZINESS: ICD-10-CM

## 2024-08-09 PROCEDURE — 70496 CT ANGIOGRAPHY HEAD: CPT

## 2024-08-09 RX ADMIN — IOHEXOL 75 ML: 350 INJECTION, SOLUTION INTRAVENOUS at 15:04

## 2024-08-12 ENCOUNTER — HOSPITAL ENCOUNTER (OUTPATIENT)
Dept: MRI IMAGING | Facility: HOSPITAL | Age: 49
Discharge: HOME | End: 2024-08-12
Payer: COMMERCIAL

## 2024-08-12 DIAGNOSIS — R42 DIZZINESS: ICD-10-CM

## 2024-08-12 PROCEDURE — 70546 MR ANGIOGRAPH HEAD W/O&W/DYE: CPT

## 2024-08-12 PROCEDURE — A9585 GADOBUTROL INJECTION: HCPCS | Performed by: INTERNAL MEDICINE

## 2024-08-12 RX ORDER — GADOBUTROL 604.72 MG/ML
7 INJECTION INTRAVENOUS
Status: COMPLETED | OUTPATIENT
Start: 2024-08-12 | End: 2024-08-12

## 2024-08-12 RX ADMIN — GADOBUTROL 7 ML: 604.72 INJECTION INTRAVENOUS at 20:51

## 2024-08-13 NOTE — TELEPHONE ENCOUNTER
Pt called to start referral but did not have referral from [place] in hand, she said dizziness was her symptom but imagining notes some trigeminal issues also. She will call back today when she gets the copy of the referral so we can enter it properly.

## 2024-08-18 NOTE — ED ATTENDING ATTESTATION
8/2/2024  I, Grayson Brooks MD, saw and evaluated the patient. I have discussed the patient with the resident/non-physician practitioner and agree with the resident's/non-physician practitioner's findings, Plan of Care, and MDM as documented in the resident's/non-physician practitioner's note, except where noted. All available labs and Radiology studies were reviewed.  I was present for key portions of any procedure(s) performed by the resident/non-physician practitioner and I was immediately available to provide assistance.       At this point I agree with the current assessment done in the Emergency Department.  I have conducted an independent evaluation of this patient a history and physical is as follows:    ED Course     Patient presents for evaluation due to a near syncopal episode at work. Reports falling and hitting head. Had similar episode in the past. Currently complaining of HA and dizziness. A/P: HA, near syncope. Will check labs and EKG to r/o cardiac cause. Will give IV fluids and treat for possible migraine.     Critical Care Time  Procedures

## 2024-08-20 ENCOUNTER — HOSPITAL ENCOUNTER (OUTPATIENT)
Dept: RADIOLOGY | Facility: HOSPITAL | Age: 49
Discharge: HOME | End: 2024-08-20
Payer: COMMERCIAL

## 2024-08-20 DIAGNOSIS — G95.9 DISEASE OF SPINAL CORD, UNSPECIFIED (HCC): ICD-10-CM

## 2024-08-20 PROCEDURE — 72156 MRI NECK SPINE W/O & W/DYE: CPT

## 2024-08-20 PROCEDURE — A9585 GADOBUTROL INJECTION: HCPCS | Performed by: INTERNAL MEDICINE

## 2024-08-20 RX ORDER — GADOBUTROL 604.72 MG/ML
7 INJECTION INTRAVENOUS
Status: COMPLETED | OUTPATIENT
Start: 2024-08-20 | End: 2024-08-20

## 2024-08-20 RX ADMIN — GADOBUTROL 7 ML: 604.72 INJECTION INTRAVENOUS at 05:35

## 2024-08-23 ENCOUNTER — CONSULT (OUTPATIENT)
Dept: NEUROSURGERY | Facility: CLINIC | Age: 49
End: 2024-08-23
Payer: COMMERCIAL

## 2024-08-23 ENCOUNTER — TELEPHONE (OUTPATIENT)
Age: 49
End: 2024-08-23

## 2024-08-23 VITALS
DIASTOLIC BLOOD PRESSURE: 80 MMHG | OXYGEN SATURATION: 99 % | TEMPERATURE: 98.2 F | HEART RATE: 67 BPM | WEIGHT: 170 LBS | RESPIRATION RATE: 18 BRPM | SYSTOLIC BLOOD PRESSURE: 122 MMHG | HEIGHT: 62 IN | BODY MASS INDEX: 31.28 KG/M2

## 2024-08-23 DIAGNOSIS — G47.00 INSOMNIA: Primary | ICD-10-CM

## 2024-08-23 PROCEDURE — 99244 OFF/OP CNSLTJ NEW/EST MOD 40: CPT | Performed by: PHYSICIAN ASSISTANT

## 2024-08-23 RX ORDER — PREGABALIN 75 MG/1
75 CAPSULE ORAL 2 TIMES DAILY
COMMUNITY
Start: 2024-08-02

## 2024-08-23 NOTE — LETTER
August 23, 2024     DIANA Thornton  [address] [address]    Patient: Kamran Rees   YOB: 1975   Date of Visit: 8/23/2024       Dear Dr. Thomas:    Thank you for referring Kamran Rees to me for evaluation. Below are my notes for this consultation.    If you have questions, please do not hesitate to call me. I look forward to following your patient along with you.         Sincerely,        Niranjan Gonzales PA-C        CC: No Recipients    Niranjan Gonzales PA-C  8/23/2024  6:10 PM  Incomplete  Office Note - Neurosurgery   Kamran Rees 48 y.o. female MRN: [id number]      Assessment:    Patient is a 48 years old pleasant woman here today referred by her neurology for evaluation of upper cervical neck pain with radiation to the occipital scalp region.  Patient noticed the pain since October 2023, and she reports the pain is very severe and demonstrating unable to sleep or function.  Denies any nausea or vomiting.  No vision, swallowing or speech problems.  Denies any gait issues, weakness in extremities, or bowel/bladder dysfunction.  She reports a slight intermittent numbness and paresthesia along the left ulnar nerve distribution.  No radicular pain,  weakness, dropping objects, or fine motor dysfunction.  Patient tried multiple epidural injections, persistent nerve block, median nerve blocks and multiple Botox with without any long-lasting effect.    Exam-A&OX3, Sivakumar, strength 5/5 bilaterally, including iOS, elbow flexion-extension, sensation to LT intact bilaterally. DTR 2+ no clonus bilaterally. Slight tenderness in the upper cervical region.    Patient with history of hypertension, intermittent asthma, sleep apnea, chronic fatigue syndrome, bilateral occipital neuralgia, anxiety, vitamin D deficiency, vertigo & chronic headache.  Denies history of diabetes mellitus, congestive heart failure, stroke, seizures, bleeding disorder or taking anticoagulant  "medications.    MRI of cervical spine 8/20/2024 without contrast demonstrates stable spondylosis at multiple sites more prominent at C4-5 and C5-6 including C6-7 due to uncovertebral osteophytes, C3-4-5 due to a small right central disc extrusion, left C5-6 neural foraminal narrowing more than right side.  MRA head 8/12/2024 demonstrates no proximal large vessel occlusion or focal high-grade stenosis, left persistent trigeminal artery noting diminutive vertebral system    Plan:  Images findings don't coincide with patient's major complaints  Advised patient to continue F/U with neurology for possible occipital block.  Advised patient to call office with worsening radicular syx, arm weakness, etc  Continue walking exercise, yoga, meditation, PT.   Call with questions or concerns.      Subjective/Objective     C/C: \" referred by her by Neurology service for evaluation of Neck pain\"    HPI  See detailed discussion above  ROS  ROS personally reviewed and updated as follows:  Review of Systems   HENT:  Negative for trouble swallowing.    Genitourinary:  Negative for enuresis.   Musculoskeletal:  Positive for gait problem (unbalanced), neck pain (neck pain radiates up to back of head) and neck stiffness (lower rom). Negative for myalgias.        Neck pain ongoing since October of 2023 unprovoked   PT- didn't help  PM-  Radiofrequency ablation of left TON, C3, C4 medial branch nerves 05/31/2024   Neurological:  Positive for numbness (left hand n+t). Negative for weakness.   Hematological:  Does not bruise/bleed easily.   Psychiatric/Behavioral:  Positive for sleep disturbance.    All other systems reviewed and are negative.      Family History    Family History   Problem Relation Age of Onset   • No Known Problems Mother    • Canavan disease Father    • Cancer Father    • Breast cancer Maternal Grandmother    • Breast cancer Paternal Grandmother        Social History    Social History     Socioeconomic History   • Marital " status: Single     Spouse name: Not on file   • Number of children: Not on file   • Years of education: 12   • Highest education level: Not on file   Occupational History   • Occupation: Unemployed    Tobacco Use   • Smoking status: Never   • Smokeless tobacco: Never   Vaping Use   • Vaping status: Never Used   Substance and Sexual Activity   • Alcohol use: Yes     Comment: Occasionally   • Drug use: No   • Sexual activity: Yes     Partners: Male     Birth control/protection: Female Sterilization   Other Topics Concern   • Not on file   Social History Narrative    · Most recent tobacco use screenin2019      · Do you currently or have you served in the [employer]:   No      · Were you activated, into active duty, as a member of the [employer] or as a Reservist:   No      · Sexual orientation:   Heterosexual      · Exercise level:   None      · Diet:   Regular      · General stress level:   Low      · Caffeine intake:   Moderate      · Guns present in home:   No      · Seat belts used routinely:   Yes      · Sunscreen used routinely:   Yes      · Smoke alarm in home:   Yes     · Advance directive:   Yes      · Live alone or with others:   with others      · Are there stairs in your home:   Yes      · International travel:   none     As per Brigitte      Social Determinants of Health     Financial Resource Strain: Not on file   Food Insecurity: Not on file   Transportation Needs: Not on file   Physical Activity: Not on file   Stress: Not on file   Social Connections: Not on file   Intimate Partner Violence: Not on file   Housing Stability: Not on file       Past Medical History    Past Medical History:   Diagnosis Date   • Acid reflux    • Asthma    • Baker cyst, left    • Constipation    • Cough    • CPAP (continuous positive airway pressure) dependence    • Digestive disorder    • Fatigue    • Headache    • Headache(784.0)    • History of mammogram 2018   • Increased urinary frequency    •  Joint pain    • Migraine    • Obesity (BMI 30-39.9)    • Postgastrectomy malabsorption    • Seasonal allergies    • Shortness of breath    • Sleep apnea    • Sleep difficulties    • Vertigo        Surgical History    Past Surgical History:   Procedure Laterality Date   • CARPAL TUNNEL RELEASE     • EGD     • ENDOMETRIAL ABLATION W/ NOVASURE  2014    Laparscopy, D&C, Novasure ablation   • EPIDURAL BLOCK INJECTION N/A 2/2/2024    Procedure: C7-T1 CERVICAL EPIDURAL STEROID INJECTION;  Surgeon: Ronni Landry MD;  Location: [facility] MAIN OR;  Service: Pain Management    • FOOT SURGERY     • HYSTERECTOMY  12/20/2016    B/L salpingectomy   • KNEE SURGERY     • NERVE BLOCK N/A 4/12/2024    Procedure: TON C3 C4 MEDIAL BRANCH BLOCK #1;  Surgeon: Ronni Landry MD;  Location: [facility] MAIN OR;  Service: Pain Management    • NERVE BLOCK N/A 4/26/2024    Procedure: TON C3 C4 MEDIAL BRANCH BLCOK;  Surgeon: Ronni Landry MD;  Location: [facility] MAIN OR;  Service: Pain Management    • PARTIAL HYSTERECTOMY     • RI LAPS GSTRC RSTRICTIV PX LONGITUDINAL GASTRECTOMY N/A 8/4/2020    Procedure: LAPAROSCOPIC SLEEVE  GASTRECTOMY;  Surgeon: Gerson Engel MD;  Location: [facility] MAIN OR;  Service: Bariatrics   • RADIOFREQUENCY ABLATION Right 5/16/2024    Procedure: RIGHT TON C3 C4 RADIO FREQUENCY ABLATION;  Surgeon: Ronni Landry MD;  Location: [facility] MAIN OR;  Service: Pain Management    • RADIOFREQUENCY ABLATION Left 5/31/2024    Procedure: LEFT TON C3 C4 RADIO FREQUENCY ABLATION;  Surgeon: Ronni Landry MD;  Location: [facility] MAIN OR;  Service: Pain Management    • TENDON REPAIR  2016   • TUBAL LIGATION  08/06/2010    Laparoscopy tubal cautery       Medications      Current Outpatient Medications:   •  azelastine (ASTELIN) 0.1 % nasal spray, 1 spray into each nostril 2 (two) times a day Use in each nostril as directed (Patient taking differently: 1 spray into each nostril as needed Use in each nostril as directed), Disp: 1 mL, Rfl: 2  •   budesonide-formoterol (SYMBICORT) 80-4.5 MCG/ACT inhaler, , Disp: , Rfl:   •  Calcium-Vitamin D-Vitamin K 650-12.5-40 MG-MCG-MCG CHEW, Chew 1 tablet, Disp: , Rfl:   •  clonazePAM (KlonoPIN) 0.5 mg tablet, Take 0.5 mg by mouth as needed, Disp: , Rfl:   •  fluticasone (FLONASE) 50 mcg/act nasal spray, 1 spray into each nostril 2 (two) times a day (Patient taking differently: 1 spray into each nostril as needed), Disp: 1 g, Rfl: 6  •  ipratropium-albuterol (DUO-NEB) 0.5-2.5 mg/3 mL nebulizer solution, INHALE 3 ML BY NEBULIZATION EVERY 4 HOURS AS NEEDED FOR WHEEZING, Disp: , Rfl:   •  losartan-hydrochlorothiazide (HYZAAR) 50-12.5 mg per tablet, Take 1 tablet by mouth daily, Disp: , Rfl:   •  Multiple Vitamin (multivitamin) tablet, Take 1 tablet by mouth daily Bariatric, Disp: , Rfl:   •  pregabalin (LYRICA) 75 mg capsule, Take 75 mg by mouth 2 (two) times a day, Disp: , Rfl:   •  tiZANidine (ZANAFLEX) 2 mg tablet, TAKE 1 TABLET BY MOUTH TWICE DAILY AS NEEDED FOR MUSCLE SPASMS, Disp: 60 tablet, Rfl: 0    Allergies    Allergies   Allergen Reactions   • Cholestyramine Other (See Comments)     Other reaction(s): Cough   Seasonal Allergies   • Meloxicam Nausea Only   • Seasonal Ic [Cholestatin] Cough     Seasonal Allergies         The following portions of the patient's history were reviewed and updated as appropriate: allergies, current medications, past family history, past medical history, past social history, past surgical history, and problem list.    Investigations    I personally reviewed the MRI results with the patient:        Physical Exam    Vitals:    08/23/24 1415   Resp: 18       Physical Exam  Eyes:      Extraocular Movements: Extraocular movements intact.      Pupils: Pupils are equal, round, and reactive to light.   Pulmonary:      Effort: Pulmonary effort is normal.   Abdominal:      General: Abdomen is flat.   Musculoskeletal:         General: Normal range of motion.      Cervical back: Normal range of  motion.   Neurological:      General: No focal deficit present.      Mental Status: She is alert and oriented to person, place, and time.   Neurologic Exam     Mental Status   Oriented to person, place, and time.     Cranial Nerves     CN III, IV, VI   Pupils are equal, round, and reactive to light.    Niranjan Gonzales PA-C  8/23/2024  6:07 PM  Sign when Signing Visit  Office Note - Neurosurgery   Kamran Rees 48 y.o. female MRN: [id number]      Assessment:    Patient is a 48 years old pleasant woman here today referred by her neurology for evaluation of upper cervical neck pain with radiation to the occipital scalp region.  Patient noticed the pain since October 2023, and she reports the pain is very severe and demonstrating unable to sleep or function.  Denies any nausea or vomiting.  No vision, swallowing or speech problems.  Denies any gait issues, weakness in extremities, or bowel/bladder dysfunction.  She reports a slight intermittent numbness and paresthesia along the left ulnar nerve distribution.  No radicular pain,  weakness, dropping objects, or fine motor dysfunction.  Patient tried multiple epidural injections, persistent nerve block, median nerve blocks and multiple Botox with without any long-lasting effect.    Patient with history of hypertension, intermittent asthma, sleep apnea, chronic fatigue syndrome, bilateral occipital neuralgia, anxiety, vitamin D deficiency, vertigo & chronic headache.  Denies history of diabetes mellitus, congestive heart failure, stroke, seizures, bleeding disorder or taking anticoagulant medications.    MRI of cervical spine 8/20/2024 without contrast demonstrates stable spondylosis at multiple sites more prominent at C4-5 and C5-6 including C6-7 due to uncovertebral osteophytes, C3-4-5 due to a small right central disc extrusion, left C5-6 neural foraminal narrowing more than right side.  MRA head 8/12/2024 demonstrates no proximal large vessel occlusion or focal  "high-grade stenosis, left persistent trigeminal artery noting diminutive vertebral system    Plan:  Images findings don't coincide with patient's major complaints  Advised patient to continue F/U with neurology for possible occipital block.  Advised patient to call office with worsening radicular syx, arm weakness, etc  Continue walking exercise, yoga, meditation, PT.   Call with questions or concerns.      Subjective/Objective    C/C: \" referred by her by Neurology service for evaluation of Neck pain\"    HPI  See detailed discussion above  ROS  ROS personally reviewed and updated as follows:  Review of Systems   HENT:  Negative for trouble swallowing.    Genitourinary:  Negative for enuresis.   Musculoskeletal:  Positive for gait problem (unbalanced), neck pain (neck pain radiates up to back of head) and neck stiffness (lower rom). Negative for myalgias.        Neck pain ongoing since October of 2023 unprovoked   PT- didn't help  PM-  Radiofrequency ablation of left TON, C3, C4 medial branch nerves 05/31/2024   Neurological:  Positive for numbness (left hand n+t). Negative for weakness.   Hematological:  Does not bruise/bleed easily.   Psychiatric/Behavioral:  Positive for sleep disturbance.    All other systems reviewed and are negative.      Family History    Family History   Problem Relation Age of Onset   • No Known Problems Mother    • Canavan disease Father    • Cancer Father    • Breast cancer Maternal Grandmother    • Breast cancer Paternal Grandmother        Social History    Social History     Socioeconomic History   • Marital status: Single     Spouse name: Not on file   • Number of children: Not on file   • Years of education: 12   • Highest education level: Not on file   Occupational History   • Occupation: Unemployed    Tobacco Use   • Smoking status: Never   • Smokeless tobacco: Never   Vaping Use   • Vaping status: Never Used   Substance and Sexual Activity   • Alcohol use: Yes     Comment: " Occasionally   • Drug use: No   • Sexual activity: Yes     Partners: Male     Birth control/protection: Female Sterilization   Other Topics Concern   • Not on file   Social History Narrative    · Most recent tobacco use screenin2019      · Do you currently or have you served in the [employer]:   No      · Were you activated, into active duty, as a member of the [employer] or as a Reservist:   No      · Sexual orientation:   Heterosexual      · Exercise level:   None      · Diet:   Regular      · General stress level:   Low      · Caffeine intake:   Moderate      · Guns present in home:   No      · Seat belts used routinely:   Yes      · Sunscreen used routinely:   Yes      · Smoke alarm in home:   Yes     · Advance directive:   Yes      · Live alone or with others:   with others      · Are there stairs in your home:   Yes      · International travel:   none     As per Bradley      Social Determinants of Health     Financial Resource Strain: Not on file   Food Insecurity: Not on file   Transportation Needs: Not on file   Physical Activity: Not on file   Stress: Not on file   Social Connections: Not on file   Intimate Partner Violence: Not on file   Housing Stability: Not on file       Past Medical History    Past Medical History:   Diagnosis Date   • Acid reflux    • Asthma    • Baker cyst, left    • Constipation    • Cough    • CPAP (continuous positive airway pressure) dependence    • Digestive disorder    • Fatigue    • Headache    • Headache(784.0)    • History of mammogram 2018   • Increased urinary frequency    • Joint pain    • Migraine    • Obesity (BMI 30-39.9)    • Postgastrectomy malabsorption    • Seasonal allergies    • Shortness of breath    • Sleep apnea    • Sleep difficulties    • Vertigo        Surgical History    Past Surgical History:   Procedure Laterality Date   • CARPAL TUNNEL RELEASE     • EGD     • ENDOMETRIAL ABLATION W/ FIATH      Laparscopy, D&C, Faith  ablation   • EPIDURAL BLOCK INJECTION N/A 2/2/2024    Procedure: C7-T1 CERVICAL EPIDURAL STEROID INJECTION;  Surgeon: Ronni Landry MD;  Location: [facility] MAIN OR;  Service: Pain Management    • FOOT SURGERY     • HYSTERECTOMY  12/20/2016    B/L salpingectomy   • KNEE SURGERY     • NERVE BLOCK N/A 4/12/2024    Procedure: TON C3 C4 MEDIAL BRANCH BLOCK #1;  Surgeon: Ronni Landry MD;  Location: [facility] MAIN OR;  Service: Pain Management    • NERVE BLOCK N/A 4/26/2024    Procedure: TON C3 C4 MEDIAL BRANCH BLCOK;  Surgeon: Ronni Landry MD;  Location: [facility] MAIN OR;  Service: Pain Management    • PARTIAL HYSTERECTOMY     • MI LAPS GSTRC RSTRICTIV PX LONGITUDINAL GASTRECTOMY N/A 8/4/2020    Procedure: LAPAROSCOPIC SLEEVE  GASTRECTOMY;  Surgeon: Gerson Engel MD;  Location: [facility] MAIN OR;  Service: Bariatrics   • RADIOFREQUENCY ABLATION Right 5/16/2024    Procedure: RIGHT TON C3 C4 RADIO FREQUENCY ABLATION;  Surgeon: Ronni Landry MD;  Location: [facility] MAIN OR;  Service: Pain Management    • RADIOFREQUENCY ABLATION Left 5/31/2024    Procedure: LEFT TON C3 C4 RADIO FREQUENCY ABLATION;  Surgeon: Ronni Landry MD;  Location: [facility] MAIN OR;  Service: Pain Management    • TENDON REPAIR  2016   • TUBAL LIGATION  08/06/2010    Laparoscopy tubal cautery       Medications      Current Outpatient Medications:   •  azelastine (ASTELIN) 0.1 % nasal spray, 1 spray into each nostril 2 (two) times a day Use in each nostril as directed (Patient taking differently: 1 spray into each nostril as needed Use in each nostril as directed), Disp: 1 mL, Rfl: 2  •  budesonide-formoterol (SYMBICORT) 80-4.5 MCG/ACT inhaler, , Disp: , Rfl:   •  Calcium-Vitamin D-Vitamin K 650-12.5-40 MG-MCG-MCG CHEW, Chew 1 tablet, Disp: , Rfl:   •  clonazePAM (KlonoPIN) 0.5 mg tablet, Take 0.5 mg by mouth as needed, Disp: , Rfl:   •  fluticasone (FLONASE) 50 mcg/act nasal spray, 1 spray into each nostril 2 (two) times a day (Patient taking differently: 1  spray into each nostril as needed), Disp: 1 g, Rfl: 6  •  ipratropium-albuterol (DUO-NEB) 0.5-2.5 mg/3 mL nebulizer solution, INHALE 3 ML BY NEBULIZATION EVERY 4 HOURS AS NEEDED FOR WHEEZING, Disp: , Rfl:   •  losartan-hydrochlorothiazide (HYZAAR) 50-12.5 mg per tablet, Take 1 tablet by mouth daily, Disp: , Rfl:   •  Multiple Vitamin (multivitamin) tablet, Take 1 tablet by mouth daily Bariatric, Disp: , Rfl:   •  pregabalin (LYRICA) 75 mg capsule, Take 75 mg by mouth 2 (two) times a day, Disp: , Rfl:   •  tiZANidine (ZANAFLEX) 2 mg tablet, TAKE 1 TABLET BY MOUTH TWICE DAILY AS NEEDED FOR MUSCLE SPASMS, Disp: 60 tablet, Rfl: 0    Allergies    Allergies   Allergen Reactions   • Cholestyramine Other (See Comments)     Other reaction(s): Cough   Seasonal Allergies   • Meloxicam Nausea Only   • Seasonal Ic [Cholestatin] Cough     Seasonal Allergies         The following portions of the patient's history were reviewed and updated as appropriate: allergies, current medications, past family history, past medical history, past social history, past surgical history, and problem list.    Investigations    I personally reviewed the MRI results with the patient:        Physical Exam    Vitals:    08/23/24 1415   Resp: 18       Physical Exam  Eyes:      Extraocular Movements: Extraocular movements intact.      Pupils: Pupils are equal, round, and reactive to light.   Pulmonary:      Effort: Pulmonary effort is normal.   Abdominal:      General: Abdomen is flat.   Musculoskeletal:         General: Normal range of motion.      Cervical back: Normal range of motion.   Neurological:      General: No focal deficit present.      Mental Status: She is alert and oriented to person, place, and time.   Neurologic Exam     Mental Status   Oriented to person, place, and time.     Cranial Nerves     CN III, IV, VI   Pupils are equal, round, and reactive to light.

## 2024-08-23 NOTE — PROGRESS NOTES
Office Note - Neurosurgery   Kamran Rees 48 y.o. female MRN: [id number]      Assessment:    Patient is a 48 years old pleasant woman here today referred by her neurology for evaluation of upper cervical neck pain with radiation to the occipital scalp region.  Patient noticed the pain since October 2023, and she reports the pain is very severe and demonstrating unable to sleep or function.  Denies any nausea or vomiting.  No vision, swallowing or speech problems.  Denies any gait issues, weakness in extremities, or bowel/bladder dysfunction.  She reports a slight intermittent numbness and paresthesia along the left ulnar nerve distribution.  No radicular pain,  weakness, dropping objects, or fine motor dysfunction.  Patient tried multiple epidural injections, persistent nerve block, median nerve blocks and multiple Botox with without any long-lasting effect.    Exam-A&OX3, Sivakumar, strength 5/5 bilaterally, including iOS, elbow flexion-extension, sensation to LT intact bilaterally. DTR 2+ no clonus bilaterally. Slight tenderness in the upper cervical region.    Patient with history of hypertension, intermittent asthma, sleep apnea, chronic fatigue syndrome, bilateral occipital neuralgia, anxiety, vitamin D deficiency, vertigo & chronic headache.  Denies history of diabetes mellitus, congestive heart failure, stroke, seizures, bleeding disorder or taking anticoagulant medications.    MRI of cervical spine 8/20/2024 without contrast demonstrates stable spondylosis at multiple sites more prominent at C4-5 and C5-6 including C6-7 due to uncovertebral osteophytes, C3-4-5 due to a small right central disc extrusion, left C5-6 neural foraminal narrowing more than right side.  MRA head 8/12/2024 demonstrates no proximal large vessel occlusion or focal high-grade stenosis, left persistent trigeminal artery noting diminutive vertebral system    Plan:  Images findings don't coincide with patient's major complaints  Advised  "patient to continue F/U with neurology for possible occipital block.  Advised patient to call office with worsening radicular syx, arm weakness, etc  Continue walking exercise, yoga, meditation, PT.   Call with questions or concerns.      Subjective/Objective     C/C: \" referred by her by Neurology service for evaluation of Neck pain\"    HPI  See detailed discussion above  JOANNA MARSHALL personally reviewed and updated as follows:  Review of Systems   HENT:  Negative for trouble swallowing.    Genitourinary:  Negative for enuresis.   Musculoskeletal:  Positive for gait problem (unbalanced), neck pain (neck pain radiates up to back of head) and neck stiffness (lower rom). Negative for myalgias.        Neck pain ongoing since October of 2023 unprovoked   PT- didn't help  PM-  Radiofrequency ablation of left TON, C3, C4 medial branch nerves 05/31/2024   Neurological:  Positive for numbness (left hand n+t). Negative for weakness.   Hematological:  Does not bruise/bleed easily.   Psychiatric/Behavioral:  Positive for sleep disturbance.    All other systems reviewed and are negative.      Family History    Family History   Problem Relation Age of Onset    No Known Problems Mother     Canavan disease Father     Cancer Father     Breast cancer Maternal Grandmother     Breast cancer Paternal Grandmother        Social History    Social History     Socioeconomic History    Marital status: Single     Spouse name: Not on file    Number of children: Not on file    Years of education: 12    Highest education level: Not on file   Occupational History    Occupation: Unemployed    Tobacco Use    Smoking status: Never    Smokeless tobacco: Never   Vaping Use    Vaping status: Never Used   Substance and Sexual Activity    Alcohol use: Yes     Comment: Occasionally    Drug use: No    Sexual activity: Yes     Partners: Male     Birth control/protection: Female Sterilization   Other Topics Concern    Not on file   Social History Narrative    " · Most recent tobacco use screenin2019      · Do you currently or have you served in the [place] [employer]:   No      · Were you activated, into active duty, as a member of the [employer] or as a Reservist:   No      · Sexual orientation:   Heterosexual      · Exercise level:   None      · Diet:   Regular      · General stress level:   Low      · Caffeine intake:   Moderate      · Guns present in home:   No      · Seat belts used routinely:   Yes      · Sunscreen used routinely:   Yes      · Smoke alarm in home:   Yes     · Advance directive:   Yes      · Live alone or with others:   with others      · Are there stairs in your home:   Yes      · International travel:   none     As per Brigitte      Social Determinants of Health     Financial Resource Strain: Not on file   Food Insecurity: Not on file   Transportation Needs: Not on file   Physical Activity: Not on file   Stress: Not on file   Social Connections: Not on file   Intimate Partner Violence: Not on file   Housing Stability: Not on file       Past Medical History    Past Medical History:   Diagnosis Date    Acid reflux     Asthma     Baker cyst, left     Constipation     Cough     CPAP (continuous positive airway pressure) dependence     Digestive disorder     Fatigue     Headache     Headache(784.0)     History of mammogram 2018    Increased urinary frequency     Joint pain     Migraine     Obesity (BMI 30-39.9)     Postgastrectomy malabsorption     Seasonal allergies     Shortness of breath     Sleep apnea     Sleep difficulties     Vertigo        Surgical History    Past Surgical History:   Procedure Laterality Date    CARPAL TUNNEL RELEASE      EGD      ENDOMETRIAL ABLATION W/ ZAC      Laparscopy, D&C, Novasure ablation    EPIDURAL BLOCK INJECTION N/A 2024    Procedure: C7-T1 CERVICAL EPIDURAL STEROID INJECTION;  Surgeon: Ronni Landry MD;  Location: [facility] MAIN OR;  Service: Pain Management     FOOT SURGERY       HYSTERECTOMY  12/20/2016    B/L salpingectomy    KNEE SURGERY      NERVE BLOCK N/A 4/12/2024    Procedure: TON C3 C4 MEDIAL BRANCH BLOCK #1;  Surgeon: Ronni Landry MD;  Location: [facility] MAIN OR;  Service: Pain Management     NERVE BLOCK N/A 4/26/2024    Procedure: TON C3 C4 MEDIAL BRANCH BLCOK;  Surgeon: Ronni Landry MD;  Location: [facility] MAIN OR;  Service: Pain Management     PARTIAL HYSTERECTOMY      MI LAPS GSTRC RSTRICTIV PX LONGITUDINAL GASTRECTOMY N/A 8/4/2020    Procedure: LAPAROSCOPIC SLEEVE  GASTRECTOMY;  Surgeon: Gerson Engel MD;  Location: [facility] MAIN OR;  Service: Bariatrics    RADIOFREQUENCY ABLATION Right 5/16/2024    Procedure: RIGHT TON C3 C4 RADIO FREQUENCY ABLATION;  Surgeon: Ronni Landry MD;  Location: [facility] MAIN OR;  Service: Pain Management     RADIOFREQUENCY ABLATION Left 5/31/2024    Procedure: LEFT TON C3 C4 RADIO FREQUENCY ABLATION;  Surgeon: Ronni Landry MD;  Location: [facility] MAIN OR;  Service: Pain Management     TENDON REPAIR  2016    TUBAL LIGATION  08/06/2010    Laparoscopy tubal cautery       Medications      Current Outpatient Medications:     azelastine (ASTELIN) 0.1 % nasal spray, 1 spray into each nostril 2 (two) times a day Use in each nostril as directed (Patient taking differently: 1 spray into each nostril as needed Use in each nostril as directed), Disp: 1 mL, Rfl: 2    budesonide-formoterol (SYMBICORT) 80-4.5 MCG/ACT inhaler, , Disp: , Rfl:     Calcium-Vitamin D-Vitamin K 650-12.5-40 MG-MCG-MCG CHEW, Chew 1 tablet, Disp: , Rfl:     clonazePAM (KlonoPIN) 0.5 mg tablet, Take 0.5 mg by mouth as needed, Disp: , Rfl:     fluticasone (FLONASE) 50 mcg/act nasal spray, 1 spray into each nostril 2 (two) times a day (Patient taking differently: 1 spray into each nostril as needed), Disp: 1 g, Rfl: 6    ipratropium-albuterol (DUO-NEB) 0.5-2.5 mg/3 mL nebulizer solution, INHALE 3 ML BY NEBULIZATION EVERY 4 HOURS AS NEEDED FOR WHEEZING, Disp: , Rfl:      losartan-hydrochlorothiazide (HYZAAR) 50-12.5 mg per tablet, Take 1 tablet by mouth daily, Disp: , Rfl:     Multiple Vitamin (multivitamin) tablet, Take 1 tablet by mouth daily Bariatric, Disp: , Rfl:     pregabalin (LYRICA) 75 mg capsule, Take 75 mg by mouth 2 (two) times a day, Disp: , Rfl:     tiZANidine (ZANAFLEX) 2 mg tablet, TAKE 1 TABLET BY MOUTH TWICE DAILY AS NEEDED FOR MUSCLE SPASMS, Disp: 60 tablet, Rfl: 0    Allergies    Allergies   Allergen Reactions    Cholestyramine Other (See Comments)     Other reaction(s): Cough   Seasonal Allergies    Meloxicam Nausea Only    Seasonal Ic [Cholestatin] Cough     Seasonal Allergies         The following portions of the patient's history were reviewed and updated as appropriate: allergies, current medications, past family history, past medical history, past social history, past surgical history, and problem list.    Investigations    I personally reviewed the MRI results with the patient:        Physical Exam    Vitals:    08/23/24 1415   Resp: 18       Physical Exam  Eyes:      Extraocular Movements: Extraocular movements intact.      Pupils: Pupils are equal, round, and reactive to light.   Pulmonary:      Effort: Pulmonary effort is normal.   Abdominal:      General: Abdomen is flat.   Musculoskeletal:         General: Normal range of motion.      Cervical back: Normal range of motion.   Neurological:      General: No focal deficit present.      Mental Status: She is alert and oriented to person, place, and time.      Motor: Motor strength is normal.     Coordination: Finger-Nose-Finger Test normal.      Deep Tendon Reflexes:      Reflex Scores:       Tricep reflexes are 2+ on the right side and 2+ on the left side.       Bicep reflexes are 2+ on the right side and 2+ on the left side.       Brachioradialis reflexes are 2+ on the right side and 2+ on the left side.       Patellar reflexes are 2+ on the right side and 2+ on the left side.       Achilles reflexes  are 2+ on the right side and 2+ on the left side.  Psychiatric:         Speech: Speech normal.     Neurologic Exam     Mental Status   Oriented to person, place, and time.   Speech: speech is normal   Level of consciousness: alert    Cranial Nerves     CN III, IV, VI   Pupils are equal, round, and reactive to light.  Right pupil: Size: 3 mm. Shape: regular.   Left pupil: Size: 3 mm. Shape: regular.   Nystagmus: none     CN XI   CN XI normal.     Motor Exam   Muscle bulk: normal  Overall muscle tone: normal  Right arm tone: normal  Left arm tone: normal  Right arm pronator drift: absent  Left arm pronator drift: absent  Right leg tone: normal  Left leg tone: normal    Strength   Strength 5/5 throughout.     Sensory Exam   Light touch normal.     Gait, Coordination, and Reflexes     Coordination   Finger to nose coordination: normal    Reflexes   Right brachioradialis: 2+  Left brachioradialis: 2+  Right biceps: 2+  Left biceps: 2+  Right triceps: 2+  Left triceps: 2+  Right patellar: 2+  Left patellar: 2+  Right achilles: 2+  Left achilles: 2+  Right : 2+  Left : 2+  Right Patel: absent  Left Patel: absent  Right ankle clonus: absent  Left ankle clonus: absent

## 2024-09-04 DIAGNOSIS — G44.86 CERVICOGENIC HEADACHE: ICD-10-CM

## 2024-09-04 RX ORDER — TIZANIDINE 2 MG/1
TABLET ORAL
Qty: 60 TABLET | Refills: 0 | Status: SHIPPED | OUTPATIENT
Start: 2024-09-04

## 2024-09-10 PROBLEM — Z48.815 ENCOUNTER FOR SURGICAL AFTERCARE FOLLOWING SURGERY OF DIGESTIVE SYSTEM: Status: ACTIVE | Noted: 2021-09-29

## 2024-09-18 ENCOUNTER — TELEPHONE (OUTPATIENT)
Age: 49
End: 2024-09-18

## 2024-09-18 NOTE — TELEPHONE ENCOUNTER
Patient called back to take the appointments she was offered yesterday, but I told patient they're no longer available and we will keep her on wait list.

## 2024-09-19 ENCOUNTER — TELEPHONE (OUTPATIENT)
Dept: OTOLARYNGOLOGY | Facility: CLINIC | Age: 49
End: 2024-09-19

## 2024-09-19 ENCOUNTER — OFFICE VISIT (OUTPATIENT)
Dept: OTOLARYNGOLOGY | Facility: CLINIC | Age: 49
End: 2024-09-19
Payer: COMMERCIAL

## 2024-09-19 VITALS
OXYGEN SATURATION: 99 % | HEIGHT: 62 IN | HEART RATE: 58 BPM | TEMPERATURE: 98.5 F | BODY MASS INDEX: 31.28 KG/M2 | WEIGHT: 170 LBS

## 2024-09-19 DIAGNOSIS — G89.29 CHRONIC NONINTRACTABLE HEADACHE, UNSPECIFIED HEADACHE TYPE: Primary | ICD-10-CM

## 2024-09-19 DIAGNOSIS — R51.9 CHRONIC NONINTRACTABLE HEADACHE, UNSPECIFIED HEADACHE TYPE: Primary | ICD-10-CM

## 2024-09-19 DIAGNOSIS — R42 VERTIGO: ICD-10-CM

## 2024-09-19 DIAGNOSIS — M43.6 NECK STIFFNESS: ICD-10-CM

## 2024-09-19 PROCEDURE — 99214 OFFICE O/P EST MOD 30 MIN: CPT | Performed by: NURSE PRACTITIONER

## 2024-09-19 RX ORDER — DIAZEPAM 5 MG
TABLET ORAL
COMMUNITY
Start: 2024-08-26

## 2024-09-19 NOTE — PATIENT INSTRUCTIONS
Discuss  with dentist for TMJ    Recommend VNG testing    Considering PPPD    Ear popping - Fluticasone nasal spray one spray each nostril twice day with Astelin nasal spray. Continue antihistamines at bedtime    Discuss Botox injections with neurology

## 2024-09-19 NOTE — PROGRESS NOTES
Assessment/Plan:      Diagnoses and all orders for this visit:    Chronic nonintractable headache, unspecified headache type  -     CT orbits/temporal bones/skull base wo contrast; Future    Vertigo  -     CT orbits/temporal bones/skull base wo contrast; Future    Neck stiffness    Other orders  -     diazepam (VALIUM) 5 mg tablet  -     CeleBREX 200 MG capsule          Symptoms include - Pt has struggled with neck pain, chronic headaches, and dizziness for years.   Vertigo worse when bends forward. Pt reports dizziness has improved and head pain, bilateral ear pain, and neck pain remain primary concerns.      Discussed possible causes of vertigo including neurology, cardiac, autoimmune, Otitis media, sinusitis, recent surgery, lab abnormalities, and inner ear concerns.  Reviewed lab results from one year ago with no specific findings.      Recent neurology evaluation and underwent MRI cervical spine and brain. Defer to PT, pain management and neurology for cervical spine concerns. Previously treated with Amitriptyline and had side effects. Injection by pain management last week. Treated once with Botox injections and felt ineffective.      MRI brain with IAC in 2021 without any IAC/ear related findings. Repeat MRI brain with IAC 11/2023 revealed no changes.      PT for vestibular therapy feels symptoms worse. More recently sought PT care at outside facility and felt no change. Stopped PT due to cost.      Audiogram 10/2021 indicating normal bilateral hearing with mild SNHL at 4K, Tymps type A. Offered repeat audiogram although not necessary due to prior normal hearing test and pt denies hearing changes.       Treatment options include at home epley's, dramamine/meclizine, scopolamine patches (OTC), repeat lab studies, vestibular therapy, VNG testing, neurology consultation.    Encouraged pt to continue to consider Botox injections vs nerve block for migraines as she has voice concerns with side effects with oral  medications for migraines. Defer to neurology/pain management for further input.      Noted on exam and on prior CT head the nasal cavity has turbinate hypertrophy with slight DNS and narrow osteomeatal complexes. The mucus retention cyst is a normal anatomical variant and is not large enough to qualify for surgical intervention and very unlikely to cause her symptoms. DNS is chronic finding and her symptoms became worse few months ago. She may consider discussion with DR Morrow or DR Colvin if she chooses to address DNS and turbinate hypertrophy for nasal congestion.      Additional concerns and findings include evidence of bruxism on exam.  Discussed TMJ syndrome - soft diet, jaw rest, NSAIDs, warm compresses, massage, oral appliance, or consultation with dentist for      Differential continues to include - cervicogenic vertigo, vestibular migraine. Also considering trigeminal neuralgia, PPPD. Despite the ear discomfort unlikely ears are primary cause of her symptoms.     After discussion agreed to home exercises, consider PT for neck discomfort, continue consult neurology, High concern for vestibular migraines vs cervicogenic vertigo.   Ear pressure/ETD  Fluticasone nasal spray one spray each nostril twice per day  Astlein nasal spray prescribed  Continue antihistamines at bedtime  Recommend VNG testing due to on going dizziness  Ordered Ct temporal bones to rule out SSCD  Follow up prn         Subjective:     Patient ID: Kamran Rees is a 48 y.o. female.    Since last visit, pain in dental area but dentist informed no finding. End August had ear discomfort. Continues to feel it behind her ears. Headaches persist back of head. Ears feel clogged. No recent URI. Dizziness associated with head pain. Worse with bending down. Pain radiating through whole head.           Review of Systems   Constitutional: Negative.    HENT:  Positive for ear pain. Negative for congestion, ear discharge, hearing loss,  nosebleeds, postnasal drip, rhinorrhea, sinus pressure, sinus pain, sore throat, tinnitus and voice change.    Respiratory:  Negative for chest tightness and shortness of breath.    Skin:  Negative for color change.   Neurological:  Positive for dizziness and headaches. Negative for numbness.   Psychiatric/Behavioral: Negative.           Objective:     Physical Exam  Constitutional:       Appearance: She is well-developed.   HENT:      Head: Normocephalic.      Right Ear: Hearing, tympanic membrane, ear canal and external ear normal. No decreased hearing noted. No drainage or tenderness. Tympanic membrane is not perforated or erythematous.      Left Ear: Hearing, tympanic membrane, ear canal and external ear normal. No decreased hearing noted. No drainage or tenderness. Tympanic membrane is not perforated or erythematous.      Nose: Nose normal. No nasal deformity or septal deviation.      Mouth/Throat:      Mouth: Mucous membranes are not pale and not dry. No oral lesions.      Dentition: Normal dentition.      Pharynx: Uvula midline. No oropharyngeal exudate.   Neck:      Trachea: No tracheal deviation.   Pulmonary:      Effort: Pulmonary effort is normal. No accessory muscle usage or respiratory distress.   Musculoskeletal:      Cervical back: Full passive range of motion without pain and neck supple.   Lymphadenopathy:      Cervical: No cervical adenopathy.   Skin:     General: Skin is warm and dry.   Neurological:      Mental Status: She is alert and oriented to person, place, and time.      Cranial Nerves: No cranial nerve deficit.      Sensory: No sensory deficit.   Psychiatric:         Behavior: Behavior is cooperative.

## 2024-09-24 ENCOUNTER — OFFICE VISIT (OUTPATIENT)
Dept: CARDIOLOGY CLINIC | Facility: CLINIC | Age: 49
End: 2024-09-24
Payer: COMMERCIAL

## 2024-09-24 VITALS
DIASTOLIC BLOOD PRESSURE: 70 MMHG | OXYGEN SATURATION: 97 % | WEIGHT: 175 LBS | HEART RATE: 90 BPM | BODY MASS INDEX: 32.2 KG/M2 | SYSTOLIC BLOOD PRESSURE: 110 MMHG | HEIGHT: 62 IN

## 2024-09-24 DIAGNOSIS — R42 DIZZINESS: Primary | ICD-10-CM

## 2024-09-24 DIAGNOSIS — I10 PRIMARY HYPERTENSION: ICD-10-CM

## 2024-09-24 PROCEDURE — 93000 ELECTROCARDIOGRAM COMPLETE: CPT | Performed by: INTERNAL MEDICINE

## 2024-09-24 PROCEDURE — 99244 OFF/OP CNSLTJ NEW/EST MOD 40: CPT | Performed by: INTERNAL MEDICINE

## 2024-09-24 NOTE — PROGRESS NOTES
[facility]  [id number]   [place] [address]  Cardiology Consultation    Kamran Rees  [id number]  1975      Consult for: Dizziness episodes  Appreciate consult by: Evelyn Thomas    1. Dizziness  POCT ECG         Discussion/Summary:   Dizziness episodes-[facility] twelve-lead EKG has no evidence of advanced heart blocks, bradycardia.  Her orthostatics here are negative for major response.  No major oscillatory findings on examination.  She states having improvement after having a nerve block.  Currently low suspicion for primary cardiac etiology.  I discussed with patient about behavior modifications.  She should wear compression when sitting and standing.  She should have frequent small meals throughout the day.    History of chronic bilateral occipital headaches/migraines    History of obesity status post gastric sleeve      HPI:   48-year-old with no prior major cardiac events presenting for episodes of dizziness previously.  She was seen in the ER in the beginning of August.  She had a near syncopal episode.  She had a workup by neurology including MRI.  She had no evidence of stroke.  There is no history of atrial fibrillation.  There is no history of myocardial or disease or peripheral vascular disease.  She was treated for possible vertebrobasilar migraines.  She states having an improvement in her symptoms after having a nerve block.  She has not had major palpitations.  Dizziness- improved with nerve block. Head pains triggers events.              PMH  Dizziness- losartan-hctz  Grastric Sleeve- 50 pounds    Social Hx  1/2 mile [employer]  No smoking  Occasional alcohol  JANETTE- tested years ago    FAmily Hx  No hx of afib/stroke    Past Medical History:   Diagnosis Date    Acid reflux     Asthma     Baker cyst, left     Constipation     Cough     CPAP (continuous positive airway pressure) dependence     Digestive disorder     Fatigue     Headache      Headache(784.0)     History of mammogram 2018    Increased urinary frequency     Joint pain     Migraine     Obesity (BMI 30-39.9)     Postgastrectomy malabsorption     Seasonal allergies     Shortness of breath     Sleep apnea     Sleep difficulties     Vertigo      Social History     Socioeconomic History    Marital status: Single     Spouse name: Not on file    Number of children: Not on file    Years of education: 12    Highest education level: Not on file   Occupational History    Occupation: Unemployed    Tobacco Use    Smoking status: Never    Smokeless tobacco: Never   Vaping Use    Vaping status: Never Used   Substance and Sexual Activity    Alcohol use: Yes     Comment: Occasionally    Drug use: No    Sexual activity: Yes     Partners: Male     Birth control/protection: Female Sterilization   Other Topics Concern    Not on file   Social History Narrative    · Most recent tobacco use screenin2019      · Do you currently or have you served in the [employer]:   No      · Were you activated, into active duty, as a member of the [employer] or as a Reservist:   No      · Sexual orientation:   Heterosexual      · Exercise level:   None      · Diet:   Regular      · General stress level:   Low      · Caffeine intake:   Moderate      · Guns present in home:   No      · Seat belts used routinely:   Yes      · Sunscreen used routinely:   Yes      · Smoke alarm in home:   Yes     · Advance directive:   Yes      · Live alone or with others:   with others      · Are there stairs in your home:   Yes      · International travel:   none     As per Brigitte      Social Determinants of Health     Financial Resource Strain: Not on file   Food Insecurity: Not on file   Transportation Needs: Not on file   Physical Activity: Not on file   Stress: Not on file   Social Connections: Not on file   Intimate Partner Violence: Not on file   Housing Stability: Not on file      Family History   Problem Relation  Age of Onset    No Known Problems Mother     Canavan disease Father     Cancer Father     Breast cancer Maternal Grandmother     Breast cancer Paternal Grandmother      Past Surgical History:   Procedure Laterality Date    CARPAL TUNNEL RELEASE      EGD      ENDOMETRIAL ABLATION W/ NOVASURE  2014    Laparscopy, D&C, Novasure ablation    EPIDURAL BLOCK INJECTION N/A 2/2/2024    Procedure: C7-T1 CERVICAL EPIDURAL STEROID INJECTION;  Surgeon: Ronni Landry MD;  Location: [facility] MAIN OR;  Service: Pain Management     FOOT SURGERY      HYSTERECTOMY  12/20/2016    B/L salpingectomy    KNEE SURGERY      NERVE BLOCK N/A 4/12/2024    Procedure: TON C3 C4 MEDIAL BRANCH BLOCK #1;  Surgeon: Ronni Landry MD;  Location: [facility] MAIN OR;  Service: Pain Management     NERVE BLOCK N/A 4/26/2024    Procedure: TON C3 C4 MEDIAL BRANCH BLCOK;  Surgeon: Ronni Landry MD;  Location: [facility] MAIN OR;  Service: Pain Management     PARTIAL HYSTERECTOMY      CT LAPS GSTRC RSTRICTIV PX LONGITUDINAL GASTRECTOMY N/A 8/4/2020    Procedure: LAPAROSCOPIC SLEEVE  GASTRECTOMY;  Surgeon: Gerson Engel MD;  Location: [facility] MAIN OR;  Service: Bariatrics    RADIOFREQUENCY ABLATION Right 5/16/2024    Procedure: RIGHT TON C3 C4 RADIO FREQUENCY ABLATION;  Surgeon: Ronni Landry MD;  Location: [facility] MAIN OR;  Service: Pain Management     RADIOFREQUENCY ABLATION Left 5/31/2024    Procedure: LEFT TON C3 C4 RADIO FREQUENCY ABLATION;  Surgeon: Ronni Landry MD;  Location: [facility] MAIN OR;  Service: Pain Management     TENDON REPAIR  2016    TUBAL LIGATION  08/06/2010    Laparoscopy tubal cautery       Current Outpatient Medications:     azelastine (ASTELIN) 0.1 % nasal spray, 1 spray into each nostril 2 (two) times a day Use in each nostril as directed (Patient taking differently: 1 spray into each nostril as needed Use in each nostril as directed), Disp: 1 mL, Rfl: 2    budesonide-formoterol (SYMBICORT) 80-4.5 MCG/ACT inhaler, , Disp: , Rfl:      "Calcium-Vitamin D-Vitamin K 650-12.5-40 MG-MCG-MCG CHEW, Chew 1 tablet, Disp: , Rfl:     CeleBREX 200 MG capsule, , Disp: , Rfl:     clonazePAM (KlonoPIN) 0.5 mg tablet, Take 0.5 mg by mouth as needed, Disp: , Rfl:     fluticasone (FLONASE) 50 mcg/act nasal spray, 1 spray into each nostril 2 (two) times a day (Patient taking differently: 1 spray into each nostril as needed), Disp: 1 g, Rfl: 6    ipratropium-albuterol (DUO-NEB) 0.5-2.5 mg/3 mL nebulizer solution, INHALE 3 ML BY NEBULIZATION EVERY 4 HOURS AS NEEDED FOR WHEEZING, Disp: , Rfl:     losartan-hydrochlorothiazide (HYZAAR) 50-12.5 mg per tablet, Take 1 tablet by mouth daily, Disp: , Rfl:     Multiple Vitamin (multivitamin) tablet, Take 1 tablet by mouth daily Bariatric, Disp: , Rfl:     tiZANidine (ZANAFLEX) 2 mg tablet, TAKE 1 TABLET BY MOUTH TWICE DAILY AS NEEDED FOR MUSCLE SPASMS, Disp: 60 tablet, Rfl: 0    diazepam (VALIUM) 5 mg tablet, , Disp: , Rfl:   Allergies   Allergen Reactions    Cholestyramine Other (See Comments)     Other reaction(s): Cough   Seasonal Allergies    Meloxicam Nausea Only    Seasonal Ic [Cholestatin] Cough     Seasonal Allergies       Vitals:    09/24/24 0840 09/24/24 0847 09/24/24 0850   BP: 104/70 110/72 110/70   BP Location: Right arm Right arm Right arm   Patient Position: Supine Sitting Standing   Cuff Size: Standard Standard Standard   Pulse: 73 84 90   SpO2: 97%     Weight: 79.4 kg (175 lb)     Height: 5' 2\" (1.575 m)         Review of Systems:   Review of Systems   Constitutional: Negative.  Negative for activity change, appetite change, chills, diaphoresis, fatigue, fever and unexpected weight change.   HENT: Negative.  Negative for congestion, dental problem, drooling, ear discharge, ear pain, facial swelling, hearing loss, mouth sores, nosebleeds, postnasal drip, rhinorrhea, sinus pressure, sinus pain, sneezing, sore throat, tinnitus, trouble swallowing and voice change.    Eyes: Negative.  Negative for photophobia, " pain, redness, itching and visual disturbance.   Respiratory: Negative.  Negative for apnea, cough, choking, chest tightness, shortness of breath, wheezing and stridor.    Cardiovascular: Negative.  Negative for chest pain, palpitations and leg swelling.   Gastrointestinal: Negative.  Negative for abdominal distention, abdominal pain, anal bleeding, blood in stool, constipation, diarrhea, nausea, rectal pain and vomiting.   Endocrine: Negative.  Negative for cold intolerance, heat intolerance, polydipsia, polyphagia and polyuria.   Genitourinary: Negative.  Negative for decreased urine volume, difficulty urinating, dyspareunia, dysuria, enuresis, flank pain, frequency, genital sores, hematuria, menstrual problem, pelvic pain, urgency, vaginal bleeding, vaginal discharge and vaginal pain.   Musculoskeletal: Negative.  Negative for arthralgias, back pain, gait problem, joint swelling, myalgias, neck pain and neck stiffness.   Skin: Negative.  Negative for color change, pallor, rash and wound.   Allergic/Immunologic: Negative.  Negative for environmental allergies, food allergies and immunocompromised state.   Neurological:  Positive for dizziness. Negative for tremors, seizures, syncope, facial asymmetry, speech difficulty, weakness, light-headedness, numbness and headaches.   Hematological: Negative.  Negative for adenopathy. Does not bruise/bleed easily.   Psychiatric/Behavioral: Negative.  Negative for agitation, behavioral problems, confusion, decreased concentration, dysphoric mood, hallucinations, self-injury, sleep disturbance and suicidal ideas. The patient is not nervous/anxious and is not hyperactive.    All other systems reviewed and are negative.      Vitals:    09/24/24 0840 09/24/24 0847 09/24/24 0850   BP: 104/70 110/72 110/70   BP Location: Right arm Right arm Right arm   Patient Position: Supine Sitting Standing   Cuff Size: Standard Standard Standard   Pulse: 73 84 90   SpO2: 97%     Weight: 79.4 kg  "(175 lb)     Height: 5' 2\" (1.575 m)       Physical Examination:   Physical Exam  Constitutional:       General: She is not in acute distress.     Appearance: She is well-developed. She is not diaphoretic.   HENT:      Head: Normocephalic and atraumatic.      Right Ear: External ear normal.      Left Ear: External ear normal.   Eyes:      General: No scleral icterus.        Right eye: No discharge.         Left eye: No discharge.      Conjunctiva/sclera: Conjunctivae normal.      Pupils: Pupils are equal, round, and reactive to light.   Neck:      Thyroid: No thyromegaly.      Vascular: No JVD.      Trachea: No tracheal deviation.   Cardiovascular:      Rate and Rhythm: Normal rate and regular rhythm.      Heart sounds: No murmur heard.     No friction rub. Gallop present.   Pulmonary:      Effort: Pulmonary effort is normal. No respiratory distress.      Breath sounds: Normal breath sounds. No stridor. No wheezing or rales.   Chest:      Chest wall: No tenderness.   Abdominal:      General: Bowel sounds are normal. There is no distension.      Palpations: Abdomen is soft. There is no mass.      Tenderness: There is no abdominal tenderness. There is no guarding or rebound.   Musculoskeletal:         General: No tenderness, deformity or edema. Normal range of motion.      Cervical back: Normal range of motion and neck supple.   Skin:     General: Skin is warm and dry.      Coloration: Skin is not pale.      Findings: No erythema or rash.   Neurological:      Mental Status: She is alert and oriented to person, place, and time.      Cranial Nerves: No cranial nerve deficit.      Motor: No abnormal muscle tone.      Coordination: Coordination normal.      Deep Tendon Reflexes: Reflexes are normal and symmetric. Reflexes normal.   Psychiatric:         Mood and Affect: Mood and affect normal.         Behavior: Behavior normal.         Thought Content: Thought content normal.         Judgment: Judgment normal. " "        Labs:     Lab Results   Component Value Date    WBC 6.06 08/02/2024    HGB 12.0 08/02/2024    HCT 34.7 (L) 08/02/2024    MCV 92 08/02/2024    RDW 11.9 08/02/2024     08/02/2024     BMP:  Lab Results   Component Value Date    SODIUM 139 08/02/2024    K 3.3 (L) 08/02/2024     08/02/2024    CO2 26 08/02/2024    ANIONGAP 12 01/22/2014    BUN 13 08/02/2024    CREATININE 0.82 08/02/2024    GLUC 86 08/02/2024    GLUF 92 10/26/2021    CALCIUM 9.1 08/02/2024    EGFR 86 08/02/2024     LFT:  Lab Results   Component Value Date    AST 9 (L) 08/02/2024    ALT 8 08/02/2024    ALKPHOS 42 08/02/2024    TP 6.5 08/02/2024    ALB 3.8 08/02/2024      Lab Results   Component Value Date    PLA7FMLYWQWL 0.597 02/01/2024     Lab Results   Component Value Date    HGBA1C 4.9 12/13/2019     Lipid Profile:   Lab Results   Component Value Date    CHOLESTEROL 142 10/26/2021    HDL 56 10/26/2021    LDLCALC 74 10/26/2021    TRIG 61 10/26/2021     Lab Results   Component Value Date    CHOLESTEROL 142 10/26/2021    CHOLESTEROL 169 12/13/2019       Imaging & Testing   I have personally reviewed pertinent reports.      Cardiac Testing         EKG: Personally reviewed.    Normal sinus rhythm at 73 beats per minutes no acute ST-T wave changes      Juan M Almazan MD [facility]  [phone]  Please call with any questions or suggestions    Counseling :  A description of the counseling:   Goals and Barriers:  Patient's ability to self care:  Medication side effect reviewed with patient in detail and all their questions answered.    \"Portions of the record may have been created with voice recognition software. Occasional wrong word or \"sound a like\" substitutions may have occurred due to the inherent limitations of voice recognition software. Read the chart carefully and recognize, using context, where substitutions have occurred. Please call if you have any questions. \"    "

## 2024-09-26 ENCOUNTER — HOSPITAL ENCOUNTER (OUTPATIENT)
Dept: CT IMAGING | Facility: HOSPITAL | Age: 49
End: 2024-09-26
Payer: COMMERCIAL

## 2024-09-26 DIAGNOSIS — R51.9 CHRONIC NONINTRACTABLE HEADACHE, UNSPECIFIED HEADACHE TYPE: ICD-10-CM

## 2024-09-26 DIAGNOSIS — R42 VERTIGO: ICD-10-CM

## 2024-09-26 DIAGNOSIS — G89.29 CHRONIC NONINTRACTABLE HEADACHE, UNSPECIFIED HEADACHE TYPE: ICD-10-CM

## 2024-09-26 PROCEDURE — 70480 CT ORBIT/EAR/FOSSA W/O DYE: CPT

## 2024-10-21 NOTE — ASSESSMENT & PLAN NOTE
-s/p Vertical Sleeve Gastrectomy with Dr. Engel on 08/04/20.  Struggling with 20lbs weight regain - will work on eliminating HS snacking and will f/u s/p neurology surgery - will discuss AOM's. Patient denies personal history of pancreatitis. Patient also denies personal and family history of medullary thyroid cancer and multiple endocrine neoplasia type 2 (MEN 2 tumor).     Needs surveillance EGD to assess esophagus and r/o Greenwood's. Also  needs colonoscopy - will refer to GI for both.       Initial: 236lbs  Current: 168.6lbs  EWL: 63%  Dutch: current  Current BMI is Body mass index is 30.87 kg/m².     Tolerating a regular diet-yes  Eating at least 60 grams of protein per day-yes  Following 30/60 minute rule with liquids-yes  Drinking at least 64 ounces of fluid per day-yes  Drinking carbonated beverages-no  Sufficient exercise-no  Using NSAIDs regularly-no  Using nicotine-no  Using alcohol-no. Advised about the risks of alcohol s/p bariatric surgery and recommend avoiding all alcohol

## 2024-10-21 NOTE — ASSESSMENT & PLAN NOTE
-At risk for malabsorption of vitamins/minerals secondary to malabsorption and restriction of intake from bariatric surgery  -Currently taking adequate postop bariatric surgery vitamin supplementation: Procare MVI w/ 45mg iron, viactiv BID    -Obtain CBC/Metabolic panel  -Patient received education about the importance of adhering to a lifelong supplementation regimen to avoid vitamin/mineral deficiencies

## 2024-10-29 ENCOUNTER — OFFICE VISIT (OUTPATIENT)
Dept: AUDIOLOGY | Facility: CLINIC | Age: 49
End: 2024-10-29
Payer: COMMERCIAL

## 2024-10-29 DIAGNOSIS — R42 VERTIGO: Primary | ICD-10-CM

## 2024-10-29 PROCEDURE — 92540 BASIC VESTIBULAR EVALUATION: CPT | Performed by: AUDIOLOGIST

## 2024-10-29 PROCEDURE — 92537 CALORIC VSTBLR TEST W/REC: CPT | Performed by: AUDIOLOGIST

## 2024-10-29 PROCEDURE — 92567 TYMPANOMETRY: CPT | Performed by: AUDIOLOGIST

## 2024-10-29 NOTE — PROGRESS NOTES
Videonystagmography (VNG) Evaluation    Name:  Kamran Rees  :  1975  Age:  48 y.o.  MRN:  [id number]  Date of Evaluation: 10/29/24     HISTORY:     Reason for visit: Dizziness    Kamran Rees is seen today at the request of Dr. Thomas for VNG testing. Kamran was unaccompanied to today's visit but did have a  to and from testing. Today, Kamran reported that onset of symptoms began approximately one year ago when she began experiencing dizziness and intense pain in the bilateral occipital regions of her head. The current symptoms are described as intermittent in frequency. Dizziness perception is described as a(n)  rocking or sometimes spinning  sensation. Associated symptoms include head/neck pain.  Episode triggers are unknown. Symptom duration was noted to typically last minutes before subsiding. Episode frequency occurs on a several times weekly basis. Kamran has fallen previously. Kamran reports that in addition to current symptoms, she whiting experience true vertigo a few times a year which resolves on its own with rest.    EVALUATION:    Otoscopic Evaluation:   Right Ear: Unremarkable, canal clear   Left Ear: Unremarkable, canal clear    Tympanometry:   Right: Type A; normal middle ear pressure and static compliance    Left: Type A; normal middle ear pressure and static compliance     Oculomotor battery:   Gaze:   Right: Within normal limits  Left: Within normal limits  Up: Within normal limits  Down: Within normal limits      Tracking: Abnormal: Gain bilaterally    Saccades: Within normal limits     Optokinetic: Within normal limits    Positioning/Positionals:     [place]:    Right: Negative. No subjective dizziness noted.     Left: Negative. Slight lightheadedness reported.       Positionals:   Sitting: Within normal limits   Supine: Within normal limits   Head Right:Within normal limits   Head Left: Within normal limits   Body Right:  Did not test  Body Left:  Did not test  30 degrees Supine:   Within normal limits       Calorics: (Normal response <25% difference)    Bithermal Caloric Irrigation: Within normal limits.     Caloric irrigations  completed without incident with good parting otoscopy noted.       IMPRESSIONS:     Essentially normal. Central findings limited to abnormal smooth pursuit gain. No peripheral findings.    Results should be interpreted with caution as patient took her normal dosage of pregabalin this morning, which has known suppressant effects on the central nervous system and may affect results.    RECOMMENDATIONS:     1) Follow-up with referring provider to review today's results.  2) Continue to monitor dizziness symptoms. If symptoms worsen or fail to improve prior to follow-up with their referring provider, contact your primary care/or referring provider and/or urgent medical attention should be considered.  3) Fall precautions were discussed at length with the patient. Most test effects are expected to subside shortly after testing is completed, it was recommended that they use caution moving around for the remainder of the day.   4) Continue vestibular therapy.      Ashlyn Edwards., CCC-A  Clinical Audiologist  [facility]  [address] [id number]

## 2024-10-30 ENCOUNTER — OFFICE VISIT (OUTPATIENT)
Dept: BARIATRICS | Facility: CLINIC | Age: 49
End: 2024-10-30
Payer: COMMERCIAL

## 2024-10-30 VITALS
HEIGHT: 62 IN | WEIGHT: 171.4 LBS | HEART RATE: 97 BPM | BODY MASS INDEX: 31.54 KG/M2 | SYSTOLIC BLOOD PRESSURE: 110 MMHG | DIASTOLIC BLOOD PRESSURE: 70 MMHG

## 2024-10-30 DIAGNOSIS — E66.811 OBESITY, CLASS I, BMI 30-34.9: ICD-10-CM

## 2024-10-30 DIAGNOSIS — Z48.815 ENCOUNTER FOR SURGICAL AFTERCARE FOLLOWING SURGERY OF DIGESTIVE SYSTEM: Primary | ICD-10-CM

## 2024-10-30 DIAGNOSIS — I10 PRIMARY HYPERTENSION: ICD-10-CM

## 2024-10-30 DIAGNOSIS — Z12.11 COLON CANCER SCREENING: ICD-10-CM

## 2024-10-30 DIAGNOSIS — K91.2 POSTSURGICAL MALABSORPTION: ICD-10-CM

## 2024-10-30 PROCEDURE — 99204 OFFICE O/P NEW MOD 45 MIN: CPT | Performed by: PHYSICIAN ASSISTANT

## 2024-10-30 RX ORDER — PREGABALIN 75 MG/1
CAPSULE ORAL
COMMUNITY
Start: 2024-08-29

## 2024-10-30 NOTE — PROGRESS NOTES
Assessment/Plan:    Encounter for surgical aftercare following surgery of digestive system    -s/p Vertical Sleeve Gastrectomy with Dr. Engel on 08/04/20.  Struggling with 20lbs weight regain - will work on eliminating HS snacking and will f/u s/p neurology surgery - will discuss AOM's. Patient denies personal history of pancreatitis. Patient also denies personal and family history of medullary thyroid cancer and multiple endocrine neoplasia type 2 (MEN 2 tumor).     Needs surveillance EGD to assess esophagus and r/o Greenwood's. Also  needs colonoscopy - will refer to GI for both.       Initial: 236lbs  Current: 168.6lbs  EWL: 63%  Dutch: current  Current BMI is Body mass index is 30.87 kg/m².     Tolerating a regular diet-yes  Eating at least 60 grams of protein per day-yes  Following 30/60 minute rule with liquids-yes  Drinking at least 64 ounces of fluid per day-yes  Drinking carbonated beverages-no  Sufficient exercise-no  Using NSAIDs regularly-no  Using nicotine-no  Using alcohol-no. Advised about the risks of alcohol s/p bariatric surgery and recommend avoiding all alcohol       Primary hypertension  -on Hyzaar  -Continue monitoring and management with prescribing provider      Postsurgical malabsorption  -At risk for malabsorption of vitamins/minerals secondary to malabsorption and restriction of intake from bariatric surgery  -Currently taking adequate postop bariatric surgery vitamin supplementation: Procare MVI w/ 45mg iron, viactiv BID    -Obtain CBC/Metabolic panel  -Patient received education about the importance of adhering to a lifelong supplementation regimen to avoid vitamin/mineral deficiencies        Diagnoses and all orders for this visit:    Encounter for surgical aftercare following surgery of digestive system    Primary hypertension    Postsurgical malabsorption  -     Ambulatory Referral to Gastroenterology; Future  -     CBC and differential; Future  -     Comprehensive metabolic panel;  Future  -     Folate; Future  -     Hemoglobin A1C; Future  -     Iron Panel (Includes Ferritin, Iron Sat%, Iron, and TIBC); Future  -     Vitamin A; Future  -     TSH, 3rd generation with Free T4 reflex; Future  -     PTH, intact; Future  -     Lipid panel; Future  -     Vitamin B1, whole blood; Future  -     Vitamin B12; Future  -     Vitamin D 25 hydroxy; Future  -     Zinc; Future    Colon cancer screening  -     Ambulatory Referral to Gastroenterology; Future    Obesity, Class I, BMI 30-34.9  -     Hemoglobin A1C; Future  -     TSH, 3rd generation with Free T4 reflex; Future  -     Lipid panel; Future    Other orders  -     pregabalin (LYRICA) 75 mg capsule          Subjective:      Patient ID: Kamran Rees is a 48 y.o. female.    -s/p Vertical Sleeve Gastrectomy with Dr. Engel on 08/04/20 Presents to the office today for overdue routine follow up. She has not been seen since 2021. Tolerating diet without issues; denies N/V, dysphagia, reflux. Overall, struggling with weight regain about 20lbs since nahed. She feels restriction with small portions.    Has upcoming occipital neuralgia surgery. She is also still having issues with vertigo. On pregabalin - hopeful to get off this s/p surgery.    Has 3 kids - youngest is 15. Unable to work for last year. Has new dog Rita and 2 cats.    Diet Recall:   B - 1 egg and cheese and 1/2 banana and 1 slice toast  L - 1/2 cheese and salami sandwich  D - baked chicken and green beans and small rice  HS - cookies and madhav crackers    Fluids - 64oz water    The following portions of the patient's history were reviewed and updated as appropriate: allergies, current medications, past family history, past medical history, past social history, past surgical history and problem list.    Review of Systems   Constitutional:  Positive for unexpected weight change (weight regain). Negative for chills and fever.   HENT:  Negative for trouble swallowing.    Respiratory:  Negative for  "cough and shortness of breath.    Cardiovascular:  Negative for chest pain and palpitations.   Gastrointestinal:  Negative for abdominal pain, constipation, diarrhea, nausea and vomiting.   Neurological:  Positive for dizziness and headaches.   Psychiatric/Behavioral:          Denies anxiety and depression         Objective:      /70 (BP Location: Right arm, Patient Position: Sitting, Cuff Size: Standard)   Pulse 97   Ht 5' 2\" (1.575 m)   Wt 77.7 kg (171 lb 6.4 oz)   BMI 31.35 kg/m²     Colonoscopy-Needs - I placed GI referral       Physical Exam  Vitals reviewed.   Constitutional:       General: She is not in acute distress.     Appearance: She is well-developed.   HENT:      Head: Normocephalic and atraumatic.   Eyes:      General: No scleral icterus.  Cardiovascular:      Rate and Rhythm: Normal rate and regular rhythm.   Pulmonary:      Effort: Pulmonary effort is normal. No respiratory distress.   Abdominal:      General: There is no distension.      Palpations: Abdomen is soft.   Skin:     General: Skin is warm and dry.   Neurological:      Mental Status: She is alert.   Psychiatric:         Mood and Affect: Mood normal.         Behavior: Behavior normal.           BARRIERS: none identified    GOALS:   Continued/Maintain healthy weight loss with good nutrition intakes.  Adequate hydration with at least 64oz. fluid intake.  Normal vitamin and mineral levels.  Exercise as tolerated.  [facility] - [employer] [place]    Follow-up in 3 months. We kindly ask that your arrive 15 minutes before your scheduled appointment time with your provider to allow our staff to room you, get your vital signs and update your chart.    Follow diet as discussed.      Get lab work done in the next 2 weeks.  You have been given a lab slip today.  Please call the office if you need a replacement.  It is recommended to check with your insurance BEFORE getting labs done to make sure they are covered by your policy.  " Also, please check with your PCP and other providers before getting labs to avoid duplicate labs. Make sure to HOLD any multivitamins that may contain biotin and any biotin supplements FOR 5 DAYS before any labs since it can affect the results.    Follow vitamin and mineral recommendations as reviewed with you.    Call our office if you have any problems with abdominal pain especially associated with fever, chills, nausea, vomiting or any other concerns.    All  Post-bariatric surgery patients should be aware that very small quantities of any alcohol can cause impairment and it is very possible not to feel the effect. The effect can be in the system for several hours.  It is also a stomach irritant.     It is advised to AVOID alcohol, Nonsteroidal antiinflammatory drugs (NSAIDS) and nicotine of all forms . Any of these can cause stomach irritation/pain.

## 2024-11-06 ENCOUNTER — TELEPHONE (OUTPATIENT)
Age: 49
End: 2024-11-06

## 2024-11-06 NOTE — TELEPHONE ENCOUNTER
11/06/24  Screened by: Flor Mckeon MA    Referring Provider Dr. Bk Garcia    Pre- Screening:     There is no height or weight on file to calculate BMI.  Has patient been referred for a routine screening Colonoscopy? yes  Is the patient between 45-75 years old? yes      Previous Colonoscopy no   If yes:    Date:     Facility:     Reason:       SCHEDULING STAFF: If the patient is between 45yrs-49yrs, please advise patient to confirm benefits/coverage with their insurance company for a routine screening colonoscopy, some insurance carriers will only cover at 50yrs or older. If the patient is over 75years old, please schedule an office visit.     Does the patient want to see a Gastroenterologist prior to their procedure OR are they having any GI symptoms? Yes would like to discuss EGD/Colonoscopy    Has the patient been hospitalized or had abdominal surgery in the past 6 months? no    Does the patient use supplemental oxygen? no    Does the patient take Coumadin, Lovenox, Plavix, Elliquis, Xarelto, or other blood thinning medication? no    Has the patient had a stroke, cardiac event, or stent placed in the past year? no    Failed oa     SCHEDULING STAFF: If patient answers NO to above questions, then schedule procedure. If patient answers YES to above questions, then schedule office appointment.     If patient is between 45yrs - 49yrs, please advise patient that we will have to confirm benefits & coverage with their insurance company for a routine screening colonoscopy.

## 2024-11-07 RX ORDER — CYCLOBENZAPRINE HCL 5 MG
5-10 TABLET ORAL
COMMUNITY
Start: 2024-11-04

## 2024-11-08 ENCOUNTER — OFFICE VISIT (OUTPATIENT)
Dept: FAMILY MEDICINE CLINIC | Facility: CLINIC | Age: 49
End: 2024-11-08
Payer: COMMERCIAL

## 2024-11-08 VITALS
WEIGHT: 173 LBS | DIASTOLIC BLOOD PRESSURE: 60 MMHG | HEIGHT: 62 IN | RESPIRATION RATE: 17 BRPM | TEMPERATURE: 98.3 F | SYSTOLIC BLOOD PRESSURE: 102 MMHG | BODY MASS INDEX: 31.83 KG/M2 | HEART RATE: 105 BPM | OXYGEN SATURATION: 96 %

## 2024-11-08 DIAGNOSIS — F41.9 ANXIETY: ICD-10-CM

## 2024-11-08 DIAGNOSIS — G89.29 CHRONIC INTRACTABLE HEADACHE, UNSPECIFIED HEADACHE TYPE: ICD-10-CM

## 2024-11-08 DIAGNOSIS — M54.81 BILATERAL OCCIPITAL NEURALGIA: ICD-10-CM

## 2024-11-08 DIAGNOSIS — Z00.00 ANNUAL PHYSICAL EXAM: Primary | ICD-10-CM

## 2024-11-08 DIAGNOSIS — R51.9 CHRONIC INTRACTABLE HEADACHE, UNSPECIFIED HEADACHE TYPE: ICD-10-CM

## 2024-11-08 DIAGNOSIS — I10 PRIMARY HYPERTENSION: ICD-10-CM

## 2024-11-08 DIAGNOSIS — M54.12 CERVICAL RADICULOPATHY: ICD-10-CM

## 2024-11-08 PROCEDURE — 99396 PREV VISIT EST AGE 40-64: CPT | Performed by: NURSE PRACTITIONER

## 2024-11-08 NOTE — PATIENT INSTRUCTIONS
"Patient Education     Routine physical for adults   The Basics   Written by the doctors and editors at [facility]   What is a physical? -- A physical is a routine visit, or \"check-up,\" with your doctor. You might also hear it called a \"wellness visit\" or \"preventive visit.\"  During each visit, the doctor will:   Ask about your physical and mental health   Ask about your habits, behaviors, and lifestyle   Do an exam   Give you vaccines if needed   Talk to you about any medicines you take   Give advice about your health   Answer your questions  Getting regular check-ups is an important part of taking care of your health. It can help your doctor find and treat any problems you have. But it's also important for preventing health problems.  A routine physical is different from a \"sick visit.\" A sick visit is when you see a doctor because of a health concern or problem. Since physicals are scheduled ahead of time, you can think about what you want to ask the doctor.  How often should I get a physical? -- It depends on your age and health. In general, for people age 21 years and older:   If you are younger than 50 years, you might be able to get a physical every 3 years.   If you are 50 years or older, your doctor might recommend a physical every year.  If you have an ongoing health condition, like diabetes or high blood pressure, your doctor will probably want to see you more often.  What happens during a physical? -- In general, each visit will include:   Physical exam - The doctor or nurse will check your height, weight, heart rate, and blood pressure. They will also look at your eyes and ears. They will ask about how you are feeling and whether you have any symptoms that bother you.   Medicines - It's a good idea to bring a list of all the medicines you take to each doctor visit. Your doctor will talk to you about your medicines and answer any questions. Tell them if you are having any side effects that bother you. You " "should also tell them if you are having trouble paying for any of your medicines.   Habits and behaviors - This includes:   Your diet   Your exercise habits   Whether you smoke, drink alcohol, or use drugs   Whether you are sexually active   Whether you feel safe at home  Your doctor will talk to you about things you can do to improve your health and lower your risk of health problems. They will also offer help and support. For example, if you want to quit smoking, they can give you advice and might prescribe medicines. If you want to improve your diet or get more physical activity, they can help you with this, too.   Lab tests, if needed - The tests you get will depend on your age and situation. For example, your doctor might want to check your:   Cholesterol   Blood sugar   Iron level   Vaccines - The recommended vaccines will depend on your age, health, and what vaccines you already had. Vaccines are very important because they can prevent certain serious or deadly infections.   Discussion of screening - \"Screening\" means checking for diseases or other health problems before they cause symptoms. Your doctor can recommend screening based on your age, risk, and preferences. This might include tests to check for:   Cancer, such as breast, prostate, cervical, ovarian, colorectal, prostate, lung, or skin cancer   Sexually transmitted infections, such as chlamydia and gonorrhea   Mental health conditions like depression and anxiety  Your doctor will talk to you about the different types of screening tests. They can help you decide which screenings to have. They can also explain what the results might mean.   Answering questions - The physical is a good time to ask the doctor or nurse questions about your health. If needed, they can refer you to other doctors or specialists, too.  Adults older than 65 years often need other care, too. As you get older, your doctor will talk to you about:   How to prevent falling at " home   Hearing or vision tests   Memory testing   How to take your medicines safely   Making sure that you have the help and support you need at home  All topics are updated as new evidence becomes available and our peer review process is complete.  This topic retrieved from [employer] on: May 02, 2024.  Topic 611376 Version 1.0  Release: 32.4.3 - C32.122  © 2024 UpToDate, Inc. and/or its affiliates. All rights reserved.  Consumer Information Use and Disclaimer   Disclaimer: This generalized information is a limited summary of diagnosis, treatment, and/or medication information. It is not meant to be comprehensive and should be used as a tool to help the user understand and/or assess potential diagnostic and treatment options. It does NOT include all information about conditions, treatments, medications, side effects, or risks that may apply to a specific patient. It is not intended to be medical advice or a substitute for the medical advice, diagnosis, or treatment of a health care provider based on the health care provider's examination and assessment of a patient's specific and unique circumstances. Patients must speak with a health care provider for complete information about their health, medical questions, and treatment options, including any risks or benefits regarding use of medications. This information does not endorse any treatments or medications as safe, effective, or approved for treating a specific patient. UpToDate, Inc. and its affiliates disclaim any warranty or liability relating to this information or the use thereof.The use of this information is governed by the Terms of Use, available at https://www.woltersHomeforswapuwer.com/en/know/clinical-effectiveness-terms. 2024© UpToDate, Inc. and its affiliates and/or licensors. All rights reserved.  Copyright   © 2024 UpToDate, Inc. and/or its affiliates. All rights reserved.

## 2024-11-08 NOTE — PROGRESS NOTES
Adult Annual Physical  Name: Kamran Rees      : 1975      MRN: [id number]  Encounter Provider: DIANA Dodd  Encounter Date: 2024   Encounter department: [facility] [place]    Assessment & Plan  Annual physical exam         Primary hypertension  Optimized on current Rx       Bilateral occipital neuralgia  Managed by multispecialty team-pain management, neurology       Cervical radiculopathy         Anxiety  Associated with health problems       Chronic intractable headache, unspecified headache type         Immunizations and preventive care screenings were discussed with patient today. Appropriate education was printed on patient's after visit summary.    Counseling:  Dental Health: discussed importance of regular tooth brushing, flossing, and dental visits.  Injury prevention: discussed safety/seat belts, safety helmets, smoke detectors, carbon monoxide detectors, and smoking near bedding or upholstery.  Exercise: the importance of regular exercise/physical activity was discussed. Recommend exercise 3-5 times per week for at least 30 minutes.       Depression Screening and Follow-up Plan: Patient was screened for depression during today's encounter. They screened negative with a PHQ-2 score of 2.      History of Present Illness     Adult Annual Physical:  Patient presents for annual physical.     Diet and Physical Activity:  - Diet/Nutrition: well balanced diet and limited junk food.  - Exercise: no formal exercise.    Depression Screening:  - PHQ-2 Score: 2    General Health:  - Sleep: 7-8 hours of sleep on average.  - Hearing: normal hearing bilateral ears.  - Vision:. eye exam utd. vision issues associated with migraine  - Dental: regular dental visits.    /GYN Health:  - Follows with GYN: yes.   - Menopause: perimenopausal.     Review of Systems   Constitutional:  Positive for fatigue. Negative for fever.   HENT:  Negative for trouble swallowing.    Respiratory: Negative.      Cardiovascular: Negative.    Gastrointestinal: Negative.    Endocrine: Negative.    Genitourinary:  Negative for difficulty urinating and dysuria.        S/p partial hysterectomy   Musculoskeletal:  Positive for arthralgias, back pain and neck pain.   Skin: Negative.    Neurological:  Positive for numbness and headaches. Negative for weakness.   Hematological:  Does not bruise/bleed easily.   Psychiatric/Behavioral:  Positive for sleep disturbance. The patient is nervous/anxious.      Medical History Reviewed by provider this encounter:  Tobacco  Allergies  Meds  Problems  Med Hx  Surg Hx  Fam Hx       Current Outpatient Medications on File Prior to Visit   Medication Sig Dispense Refill    Calcium-Vitamin D-Vitamin K 650-12.5-40 MG-MCG-MCG CHEW Chew 1 tablet      clonazePAM (KlonoPIN) 0.5 mg tablet Take 0.5 mg by mouth as needed      cyclobenzaprine (FLEXERIL) 5 mg tablet Take 5-10 mg by mouth daily at bedtime as needed      losartan-hydrochlorothiazide (HYZAAR) 50-12.5 mg per tablet Take 1 tablet by mouth daily      Multiple Vitamin (multivitamin) tablet Take 1 tablet by mouth daily Bariatric      pregabalin (LYRICA) 75 mg capsule       tiZANidine (ZANAFLEX) 2 mg tablet TAKE 1 TABLET BY MOUTH TWICE DAILY AS NEEDED FOR MUSCLE SPASMS 60 tablet 0    azelastine (ASTELIN) 0.1 % nasal spray 1 spray into each nostril 2 (two) times a day Use in each nostril as directed (Patient not taking: Reported on 10/30/2024) 1 mL 2    budesonide-formoterol (SYMBICORT) 80-4.5 MCG/ACT inhaler  (Patient not taking: Reported on 10/30/2024)      fluticasone (FLONASE) 50 mcg/act nasal spray 1 spray into each nostril 2 (two) times a day (Patient not taking: Reported on 10/30/2024) 1 g 6    hyaluronan (MONOVISC) injection Inject 4 mL (full syringe contents) intra-articularly into affected knee once (Patient not taking: Reported on 11/8/2024)      ipratropium-albuterol (DUO-NEB) 0.5-2.5 mg/3 mL nebulizer solution INHALE 3 ML BY  "NEBULIZATION EVERY 4 HOURS AS NEEDED FOR WHEEZING (Patient not taking: Reported on 10/30/2024)       No current facility-administered medications on file prior to visit.        Objective     /60 (BP Location: Left arm, Patient Position: Sitting, Cuff Size: Standard)   Pulse 105   Temp 98.3 °F (36.8 °C) (Temporal)   Resp 17   Ht 5' 2\" (1.575 m)   Wt 78.5 kg (173 lb)   SpO2 96%   BMI 31.64 kg/m²     Physical Exam  Vitals and nursing note reviewed.   Constitutional:       General: She is not in acute distress.     Appearance: Normal appearance.   HENT:      Head: Normocephalic and atraumatic.   Eyes:      Pupils: Pupils are equal, round, and reactive to light.   Cardiovascular:      Rate and Rhythm: Normal rate and regular rhythm.      Pulses: Normal pulses.      Heart sounds: Normal heart sounds.   Pulmonary:      Effort: Pulmonary effort is normal.      Breath sounds: Normal breath sounds.   Abdominal:      General: Bowel sounds are normal.      Palpations: Abdomen is soft.      Tenderness: There is no abdominal tenderness.   Musculoskeletal:      Cervical back: Tenderness present.      Right lower leg: No edema.      Left lower leg: No edema.   Lymphadenopathy:      Cervical: No cervical adenopathy.   Skin:     General: Skin is warm and dry.      Coloration: Skin is not pale.   Neurological:      General: No focal deficit present.      Mental Status: She is alert. Mental status is at baseline.      Motor: No weakness.      Gait: Gait normal.   Psychiatric:         Mood and Affect: Mood normal.         Behavior: Behavior normal.       "

## 2024-11-15 NOTE — PROGRESS NOTES
Daily Note     Today's date: 12/15/2023  Patient name: Stacey Long  : 1975  MRN: [id number]  Referring provider: DIANA Alan  Dx:   Encounter Diagnosis     ICD-10-CM    1. Vertigo  R42       2. Neck stiffness  M43.6                      Subjective: Patient reports that she had injections yesterday. Sore from that entering treatment. Objective: See treatment diary below  Limited cervical extension and right rotation    Assessment: Tolerated treatment well. Patient  demonstrated improved cervical ROM post session and less feeling off when turning head to the right. Plan: Continue per plan of care. Insurance:  AMA/CMS Eval/ Re-eval POC expires Ashley Allison #/ Referral # Total    Start date  Expiration date Extension  Visit limitation? PT only or  PT+OT? Co-Insurance   CMS 12.7 2..24                              AUTH #:  Date               Authed: Used                Remaining                    1 unit billing    Precautions: vertigo, standard precautions  Patient provided verbal consent to treatment plan and recommended interventions.     Manuals 12. 1215           Visit  1 2                                                  Neuro Re-Ed                                                                                                        Ther Ex             Cerv retraction 3*10 3*10           Retraction with OP  2*10           T-spine exten over chair  2*10                                                                            Ther Activity             Pt edu FB 16

## 2024-11-27 ENCOUNTER — TELEPHONE (OUTPATIENT)
Age: 49
End: 2024-11-27

## 2024-11-27 NOTE — TELEPHONE ENCOUNTER
Aracelis from [facility]    called for last office note as pt has appt there, sent via [employer].

## 2024-12-09 ENCOUNTER — NURSE TRIAGE (OUTPATIENT)
Dept: FAMILY MEDICINE CLINIC | Facility: CLINIC | Age: 49
End: 2024-12-09

## 2024-12-09 NOTE — TELEPHONE ENCOUNTER
Called and spoke w pt -- she reports Sx ongoing x 1 wk at this point. Cough (lingering)/congestion and pt does have asthma. Apologized for improper routing of triage and pt was agreeable to in office evaluation tomorrow w Dr. Hill as he is her childrens' doctor. NFA needed at this time.

## 2024-12-09 NOTE — TELEPHONE ENCOUNTER
Regarding: Sick visit  ----- Message from Saba RANDOLPH sent at 12/6/2024  1:00 PM EST -----  The patient has asthma and she thinks she has developed bronchitis and would like to be seen Monday. PT does not have a preference on provider for this visit.  Please call pt back to schedule. Office was unavailable. She declined care now and is fine waiting through the weekend for Monday, the symptoms start last Monday and OTC meds are not working.

## 2024-12-10 ENCOUNTER — OFFICE VISIT (OUTPATIENT)
Dept: FAMILY MEDICINE CLINIC | Facility: CLINIC | Age: 49
End: 2024-12-10
Payer: COMMERCIAL

## 2024-12-10 VITALS
DIASTOLIC BLOOD PRESSURE: 82 MMHG | TEMPERATURE: 98.4 F | SYSTOLIC BLOOD PRESSURE: 118 MMHG | OXYGEN SATURATION: 99 % | HEIGHT: 62 IN | WEIGHT: 175.4 LBS | HEART RATE: 79 BPM | BODY MASS INDEX: 32.28 KG/M2

## 2024-12-10 DIAGNOSIS — R05.1 ACUTE COUGH: Primary | ICD-10-CM

## 2024-12-10 DIAGNOSIS — J40 BRONCHITIS: ICD-10-CM

## 2024-12-10 DIAGNOSIS — R09.81 NASAL CONGESTION: ICD-10-CM

## 2024-12-10 DIAGNOSIS — I10 PRIMARY HYPERTENSION: ICD-10-CM

## 2024-12-10 DIAGNOSIS — M54.81 BILATERAL OCCIPITAL NEURALGIA: ICD-10-CM

## 2024-12-10 DIAGNOSIS — J45.20 MILD INTERMITTENT ASTHMA, UNSPECIFIED WHETHER COMPLICATED: ICD-10-CM

## 2024-12-10 LAB
SARS-COV-2 AG UPPER RESP QL IA: NEGATIVE
SL AMB POCT RAPID FLU A: NEGATIVE
SL AMB POCT RAPID FLU B: NEGATIVE
VALID CONTROL: NORMAL

## 2024-12-10 PROCEDURE — 87811 SARS-COV-2 COVID19 W/OPTIC: CPT | Performed by: FAMILY MEDICINE

## 2024-12-10 PROCEDURE — 87804 INFLUENZA ASSAY W/OPTIC: CPT | Performed by: FAMILY MEDICINE

## 2024-12-10 PROCEDURE — 99214 OFFICE O/P EST MOD 30 MIN: CPT | Performed by: FAMILY MEDICINE

## 2024-12-10 RX ORDER — METHYLPREDNISOLONE 4 MG/1
TABLET ORAL
Qty: 21 EACH | Refills: 0 | Status: SHIPPED | OUTPATIENT
Start: 2024-12-10

## 2024-12-10 RX ORDER — HYDROCODONE BITARTRATE AND ACETAMINOPHEN 5; 325 MG/1; MG/1
TABLET ORAL
COMMUNITY

## 2024-12-10 RX ORDER — TRIAMCINOLONE ACETONIDE 0.25 MG/G
CREAM TOPICAL
COMMUNITY
Start: 2024-12-05

## 2024-12-10 RX ORDER — BUDESONIDE AND FORMOTEROL FUMARATE DIHYDRATE 80; 4.5 UG/1; UG/1
2 AEROSOL RESPIRATORY (INHALATION) 2 TIMES DAILY
Qty: 10.2 G | Refills: 3 | Status: SHIPPED | OUTPATIENT
Start: 2024-12-10

## 2024-12-10 RX ORDER — AMOXICILLIN 875 MG/1
875 TABLET, COATED ORAL 2 TIMES DAILY
Qty: 20 TABLET | Refills: 0 | Status: SHIPPED | OUTPATIENT
Start: 2024-12-10 | End: 2024-12-20

## 2024-12-10 RX ORDER — DEXTROMETHORPHAN HYDROBROMIDE AND PROMETHAZINE HYDROCHLORIDE 15; 6.25 MG/5ML; MG/5ML
5 SYRUP ORAL 4 TIMES DAILY PRN
Qty: 180 ML | Refills: 0 | Status: SHIPPED | OUTPATIENT
Start: 2024-12-10

## 2024-12-10 RX ORDER — ALBUTEROL SULFATE 90 UG/1
2 INHALANT RESPIRATORY (INHALATION) 4 TIMES DAILY
Qty: 18 G | Refills: 3 | Status: SHIPPED | OUTPATIENT
Start: 2024-12-10

## 2024-12-10 NOTE — ASSESSMENT & PLAN NOTE
Patient has problems with asthmatic bronchitis every winter and has been treated by her former physician Dr. Dodd, for many years.  Dr. Abraham has always treated with amoxicillin and Medrol Dosepak with occasional cough medicine.  We discussed at length the use of steroids and Medrol dose pack and I will order 1 but she is going to hold on taking it and see how she does with her other modalities just ordered--amoxicillin and Phenergan.  Orders:  •  budesonide-formoterol (SYMBICORT) 80-4.5 MCG/ACT inhaler; Inhale 2 puffs 2 (two) times a day Rinse mouth after use.  •  albuterol (PROVENTIL HFA,VENTOLIN HFA) 90 mcg/act inhaler; Inhale 2 puffs 4 (four) times a day  •  amoxicillin (AMOXIL) 875 mg tablet; Take 1 tablet (875 mg total) by mouth 2 (two) times a day for 10 days  •  methylPREDNISolone 4 MG tablet therapy pack; Use as directed on package  •  promethazine-dextromethorphan (PHENERGAN-DM) 6.25-15 mg/5 mL oral syrup; Take 5 mL by mouth 4 (four) times a day as needed for cough
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
## 2024-12-10 NOTE — ASSESSMENT & PLAN NOTE
Patient has had terrible problems with occipital neuralgia.  She has seen multiple specialist and is currently, after trying about every modality including nerve blocks Botox, seeking plastic surgery relief from a plastic surgeon in [facility] who has done multiple surgeries for relief of occipital neuralgia.  I would imagine he is doing a lysis of adhesions around the occipital nerve or relieving the occipital nerve from some entrapment?  Will be interesting to see how this plays out.

## 2024-12-12 ENCOUNTER — CONSULT (OUTPATIENT)
Dept: GASTROENTEROLOGY | Facility: CLINIC | Age: 49
End: 2024-12-12
Payer: COMMERCIAL

## 2024-12-12 VITALS
HEART RATE: 85 BPM | DIASTOLIC BLOOD PRESSURE: 87 MMHG | HEIGHT: 62 IN | SYSTOLIC BLOOD PRESSURE: 122 MMHG | WEIGHT: 173 LBS | BODY MASS INDEX: 31.83 KG/M2

## 2024-12-12 DIAGNOSIS — Z12.11 COLON CANCER SCREENING: ICD-10-CM

## 2024-12-12 PROCEDURE — 99243 OFF/OP CNSLTJ NEW/EST LOW 30: CPT | Performed by: NURSE PRACTITIONER

## 2024-12-12 RX ORDER — SODIUM CHLORIDE, SODIUM LACTATE, POTASSIUM CHLORIDE, CALCIUM CHLORIDE 600; 310; 30; 20 MG/100ML; MG/100ML; MG/100ML; MG/100ML
125 INJECTION, SOLUTION INTRAVENOUS CONTINUOUS
OUTPATIENT
Start: 2024-12-12

## 2024-12-12 NOTE — PROGRESS NOTES
Name: Kamran Rees      : 1975      MRN: [id number]  Encounter Provider: DIANA Bass  Encounter Date: 2024   Encounter department: [facility] GASTROENTEROLOGY [place] [address]  :  Assessment & Plan  Colon cancer screening  Patient is 49 years old and never had a colonoscopy or Cologuard done for colon cancer screening.  Patient is overdue for colon cancer screening.  Orders:    Ambulatory Referral to Gastroenterology    Colonoscopy; Future    polyethylene glycol (GOLYTELY) 4000 mL solution; Take 4,000 mL by mouth once for 1 dose Take as directed prior to procedure        History of Present Illness   HPI  Kamran Rees is a 49 y.o. female with PMH of asthma, vitamin D deficiency, neck stiffness, chronic headaches, sleeve gastrectomy, and hypertension who presents to office as consult for colon cancer screening.  Patient reports very rare episodes of acid reflux which are relieved with use of Tums.  Patient denies heartburn, nausea, vomiting, dysphagia, epigastric or abdominal pain.  Patient denies blood in stool, blood from rectal area, or black tarry stool.  Patient reports she moves her bowels every other day.  Abdomen exam to be benign no abdominal tenderness or guarding.     No previous Colonoscopy. Patient does not smoke. Patient drinks alcohol socially. No family history of gastric or colon cancer.  Patient does have history of asthma and is currently being treated with steroids, antibiotics and inhaler but when I listen to her lungs they were clear to auscultation.  Patient also has a history of chronic migraines for which she is having further workup done, she is scheduled for carotid ultrasound for further evaluation.           Review of Systems   Constitutional:  Negative for chills and fever.   HENT:  Negative for ear pain and sore throat.    Eyes:  Negative for pain and visual disturbance.   Respiratory:  Negative for cough and shortness of breath.     Cardiovascular:  Negative for chest pain and palpitations.   Gastrointestinal:  Negative for abdominal pain and vomiting.   Genitourinary:  Negative for dysuria and hematuria.   Musculoskeletal:  Negative for arthralgias and back pain.   Skin:  Negative for color change and rash.   Neurological:  Negative for seizures and syncope.   All other systems reviewed and are negative.    Medical History Reviewed by provider this encounter:  Tobacco  Allergies  Meds  Problems  Med Hx  Surg Hx  Fam Hx     .  Past Medical History   Past Medical History:   Diagnosis Date    Acid reflux     Asthma     Baker cyst, left     Constipation     Cough     CPAP (continuous positive airway pressure) dependence     Digestive disorder     Fatigue     Headache     Headache(784.0)     History of mammogram 07/17/2018    Increased urinary frequency     Joint pain     Migraine     Obesity (BMI 30-39.9)     Postgastrectomy malabsorption     Seasonal allergies     Shortness of breath     Sleep apnea     Sleep difficulties     Vertigo      Past Surgical History:   Procedure Laterality Date    CARPAL TUNNEL RELEASE      EGD      ENDOMETRIAL ABLATION W/ NOVASURE  2014    Laparscopy, D&C, Novasure ablation    EPIDURAL BLOCK INJECTION N/A 02/02/2024    Procedure: C7-T1 CERVICAL EPIDURAL STEROID INJECTION;  Surgeon: Ronni Landry MD;  Location: [facility] MAIN OR;  Service: Pain Management     FOOT SURGERY      HYSTERECTOMY  12/20/2016    B/L salpingectomy    KNEE SURGERY      KNEE SURGERY      05/2024    NERVE BLOCK N/A 04/12/2024    Procedure: TON C3 C4 MEDIAL BRANCH BLOCK #1;  Surgeon: Ronni Landry MD;  Location: [facility] MAIN OR;  Service: Pain Management     NERVE BLOCK N/A 04/26/2024    Procedure: TON C3 C4 MEDIAL BRANCH BLCOK;  Surgeon: Ronni Landry MD;  Location: [facility] MAIN OR;  Service: Pain Management     PARTIAL HYSTERECTOMY      AZ LAPS GSTRC RSTRICTIV PX LONGITUDINAL GASTRECTOMY N/A 08/04/2020    Procedure: LAPAROSCOPIC SLEEVE   GASTRECTOMY;  Surgeon: Gerson Engel MD;  Location: [facility] MAIN OR;  Service: Bariatrics    RADIOFREQUENCY ABLATION Right 05/16/2024    Procedure: RIGHT TON C3 C4 RADIO FREQUENCY ABLATION;  Surgeon: Ronni Landry MD;  Location: [facility] MAIN OR;  Service: Pain Management     RADIOFREQUENCY ABLATION Left 05/31/2024    Procedure: LEFT TON C3 C4 RADIO FREQUENCY ABLATION;  Surgeon: Ronni Landry MD;  Location: [facility] MAIN OR;  Service: Pain Management     TENDON REPAIR  2016    TUBAL LIGATION  08/06/2010    Laparoscopy tubal cautery     Family History   Problem Relation Age of Onset    No Known Problems Mother     Canavan disease Father     Cancer Father     Breast cancer Maternal Grandmother     Breast cancer Paternal Grandmother       reports that she has never smoked. She has never used smokeless tobacco. She reports current alcohol use. She reports that she does not use drugs.  Current Outpatient Medications on File Prior to Visit   Medication Sig Dispense Refill    albuterol (PROVENTIL HFA,VENTOLIN HFA) 90 mcg/act inhaler Inhale 2 puffs 4 (four) times a day 18 g 3    amoxicillin (AMOXIL) 875 mg tablet Take 1 tablet (875 mg total) by mouth 2 (two) times a day for 10 days 20 tablet 0    azelastine (ASTELIN) 0.1 % nasal spray 1 spray into each nostril 2 (two) times a day Use in each nostril as directed 1 mL 2    budesonide-formoterol (SYMBICORT) 80-4.5 MCG/ACT inhaler Inhale 2 puffs 2 (two) times a day Rinse mouth after use. 10.2 g 3    Calcium-Vitamin D-Vitamin K 650-12.5-40 MG-MCG-MCG CHEW Chew 1 tablet      clonazePAM (KlonoPIN) 0.5 mg tablet Take 0.5 mg by mouth as needed      cyclobenzaprine (FLEXERIL) 5 mg tablet Take 5-10 mg by mouth daily at bedtime as needed      fluticasone (FLONASE) 50 mcg/act nasal spray 1 spray into each nostril 2 (two) times a day 1 g 6    HYDROcodone-acetaminophen (NORCO) 5-325 mg per tablet Every 6 hours      ipratropium-albuterol (DUO-NEB) 0.5-2.5 mg/3 mL nebulizer solution        losartan-hydrochlorothiazide (HYZAAR) 50-12.5 mg per tablet Take 1 tablet by mouth daily      methylPREDNISolone 4 MG tablet therapy pack Use as directed on package 21 each 0    Multiple Vitamin (multivitamin) tablet Take 1 tablet by mouth daily Bariatric      promethazine-dextromethorphan (PHENERGAN-DM) 6.25-15 mg/5 mL oral syrup Take 5 mL by mouth 4 (four) times a day as needed for cough 180 mL 0    triamcinolone (KENALOG) 0.025 % cream APPLY THIN LAYER TOPICALLY TO THE AFFECTED AREA TWICE DAILY       No current facility-administered medications on file prior to visit.     Allergies   Allergen Reactions    Meloxicam Nausea Only      Current Outpatient Medications on File Prior to Visit   Medication Sig Dispense Refill    albuterol (PROVENTIL HFA,VENTOLIN HFA) 90 mcg/act inhaler Inhale 2 puffs 4 (four) times a day 18 g 3    amoxicillin (AMOXIL) 875 mg tablet Take 1 tablet (875 mg total) by mouth 2 (two) times a day for 10 days 20 tablet 0    azelastine (ASTELIN) 0.1 % nasal spray 1 spray into each nostril 2 (two) times a day Use in each nostril as directed 1 mL 2    budesonide-formoterol (SYMBICORT) 80-4.5 MCG/ACT inhaler Inhale 2 puffs 2 (two) times a day Rinse mouth after use. 10.2 g 3    Calcium-Vitamin D-Vitamin K 650-12.5-40 MG-MCG-MCG CHEW Chew 1 tablet      clonazePAM (KlonoPIN) 0.5 mg tablet Take 0.5 mg by mouth as needed      cyclobenzaprine (FLEXERIL) 5 mg tablet Take 5-10 mg by mouth daily at bedtime as needed      fluticasone (FLONASE) 50 mcg/act nasal spray 1 spray into each nostril 2 (two) times a day 1 g 6    HYDROcodone-acetaminophen (NORCO) 5-325 mg per tablet Every 6 hours      ipratropium-albuterol (DUO-NEB) 0.5-2.5 mg/3 mL nebulizer solution       losartan-hydrochlorothiazide (HYZAAR) 50-12.5 mg per tablet Take 1 tablet by mouth daily      methylPREDNISolone 4 MG tablet therapy pack Use as directed on package 21 each 0    Multiple Vitamin (multivitamin) tablet Take 1 tablet by mouth daily  "Bariatric      promethazine-dextromethorphan (PHENERGAN-DM) 6.25-15 mg/5 mL oral syrup Take 5 mL by mouth 4 (four) times a day as needed for cough 180 mL 0    triamcinolone (KENALOG) 0.025 % cream APPLY THIN LAYER TOPICALLY TO THE AFFECTED AREA TWICE DAILY       No current facility-administered medications on file prior to visit.      Social History     Tobacco Use    Smoking status: Never    Smokeless tobacco: Never   Vaping Use    Vaping status: Never Used   Substance and Sexual Activity    Alcohol use: Yes     Comment: Occasionally    Drug use: No    Sexual activity: Yes     Partners: Male     Birth control/protection: Female Sterilization        Objective   /87 (BP Location: Left arm, Patient Position: Sitting, Cuff Size: Standard)   Pulse 85   Ht 5' 2\" (1.575 m)   Wt 78.5 kg (173 lb)   BMI 31.64 kg/m²      Physical Exam  Vitals and nursing note reviewed.   Constitutional:       General: She is not in acute distress.     Appearance: She is well-developed.   HENT:      Head: Normocephalic and atraumatic.   Eyes:      Conjunctiva/sclera: Conjunctivae normal.   Cardiovascular:      Rate and Rhythm: Normal rate and regular rhythm.      Pulses: Normal pulses.      Heart sounds: Normal heart sounds. No murmur heard.  Pulmonary:      Effort: Pulmonary effort is normal. No respiratory distress.      Breath sounds: Normal breath sounds. No stridor. No wheezing, rhonchi or rales.   Abdominal:      General: Bowel sounds are normal. There is no distension.      Palpations: Abdomen is soft. There is no mass.      Tenderness: There is no abdominal tenderness. There is no guarding or rebound.   Musculoskeletal:         General: No swelling.      Cervical back: Neck supple.      Right lower leg: No edema.      Left lower leg: No edema.   Skin:     General: Skin is warm and dry.      Capillary Refill: Capillary refill takes less than 2 seconds.      Coloration: Skin is not jaundiced or pale.   Neurological:      Mental " Status: She is alert and oriented to person, place, and time.   Psychiatric:         Mood and Affect: Mood normal.

## 2024-12-12 NOTE — ASSESSMENT & PLAN NOTE
Patient is 49 years old and never had a colonoscopy or Cologuard done for colon cancer screening.  Patient is overdue for colon cancer screening.  Orders:    Ambulatory Referral to Gastroenterology    Colonoscopy; Future    polyethylene glycol (GOLYTELY) 4000 mL solution; Take 4,000 mL by mouth once for 1 dose Take as directed prior to procedure

## 2024-12-13 ENCOUNTER — RESULTS FOLLOW-UP (OUTPATIENT)
Dept: BARIATRICS | Facility: CLINIC | Age: 49
End: 2024-12-13

## 2024-12-13 ENCOUNTER — HOSPITAL ENCOUNTER (OUTPATIENT)
Dept: VASCULAR ULTRASOUND | Facility: HOSPITAL | Age: 49
Discharge: HOME | End: 2024-12-13
Payer: COMMERCIAL

## 2024-12-13 ENCOUNTER — APPOINTMENT (OUTPATIENT)
Dept: LAB | Facility: CLINIC | Age: 49
End: 2024-12-13
Payer: COMMERCIAL

## 2024-12-13 DIAGNOSIS — E67.0 HIGH VITAMIN A LEVEL: ICD-10-CM

## 2024-12-13 DIAGNOSIS — R42 DIZZINESS AND GIDDINESS: ICD-10-CM

## 2024-12-13 DIAGNOSIS — R79.89 LOW VITAMIN B12 LEVEL: ICD-10-CM

## 2024-12-13 DIAGNOSIS — K91.2 POSTSURGICAL MALABSORPTION: Primary | ICD-10-CM

## 2024-12-13 DIAGNOSIS — K91.2 POSTSURGICAL MALABSORPTION: ICD-10-CM

## 2024-12-13 DIAGNOSIS — M54.2 CERVICALGIA: ICD-10-CM

## 2024-12-13 DIAGNOSIS — R51.9 NONINTRACTABLE HEADACHE, UNSPECIFIED CHRONICITY PATTERN, UNSPECIFIED HEADACHE TYPE: ICD-10-CM

## 2024-12-13 DIAGNOSIS — M54.81 OCCIPITAL NEURALGIA, UNSPECIFIED LATERALITY: ICD-10-CM

## 2024-12-13 DIAGNOSIS — E66.811 OBESITY, CLASS I, BMI 30-34.9: ICD-10-CM

## 2024-12-13 DIAGNOSIS — E55.9 VITAMIN D INSUFFICIENCY: ICD-10-CM

## 2024-12-13 LAB
25(OH)D3 SERPL-MCNC: 22.6 NG/ML (ref 30–100)
ALBUMIN SERPL BCG-MCNC: 3.9 G/DL (ref 3.5–5)
ALP SERPL-CCNC: 49 U/L (ref 34–104)
ALT SERPL W P-5'-P-CCNC: 8 U/L (ref 7–52)
ANION GAP SERPL CALCULATED.3IONS-SCNC: 4 MMOL/L (ref 4–13)
AST SERPL W P-5'-P-CCNC: 10 U/L (ref 13–39)
BASOPHILS # BLD AUTO: 0.05 THOUSANDS/ÂΜL (ref 0–0.1)
BASOPHILS NFR BLD AUTO: 1 % (ref 0–1)
BILIRUB SERPL-MCNC: 0.47 MG/DL (ref 0.2–1)
BUN SERPL-MCNC: 12 MG/DL (ref 5–25)
CALCIUM SERPL-MCNC: 9.1 MG/DL (ref 8.4–10.2)
CHLORIDE SERPL-SCNC: 101 MMOL/L (ref 96–108)
CHOLEST SERPL-MCNC: 156 MG/DL (ref ?–200)
CO2 SERPL-SCNC: 32 MMOL/L (ref 21–32)
CREAT SERPL-MCNC: 0.72 MG/DL (ref 0.6–1.3)
EOSINOPHIL # BLD AUTO: 0.03 THOUSAND/ÂΜL (ref 0–0.61)
EOSINOPHIL NFR BLD AUTO: 0 % (ref 0–6)
ERYTHROCYTE [DISTWIDTH] IN BLOOD BY AUTOMATED COUNT: 12.5 % (ref 11.6–15.1)
EST. AVERAGE GLUCOSE BLD GHB EST-MCNC: 94 MG/DL
FERRITIN SERPL-MCNC: 41 NG/ML (ref 11–307)
FOLATE SERPL-MCNC: 12.9 NG/ML
GFR SERPL CREATININE-BSD FRML MDRD: 100 ML/MIN/1.73SQ M
GLUCOSE P FAST SERPL-MCNC: 82 MG/DL (ref 65–99)
HBA1C MFR BLD: 4.9 %
HCT VFR BLD AUTO: 37 % (ref 34.8–46.1)
HDLC SERPL-MCNC: 56 MG/DL
HGB BLD-MCNC: 12.9 G/DL (ref 11.5–15.4)
IMM GRANULOCYTES # BLD AUTO: 0.03 THOUSAND/UL (ref 0–0.2)
IMM GRANULOCYTES NFR BLD AUTO: 0 % (ref 0–2)
IRON SATN MFR SERPL: 22 % (ref 15–50)
IRON SERPL-MCNC: 67 UG/DL (ref 50–212)
LDLC SERPL CALC-MCNC: 79 MG/DL (ref 0–100)
LYMPHOCYTES # BLD AUTO: 2.99 THOUSANDS/ÂΜL (ref 0.6–4.47)
LYMPHOCYTES NFR BLD AUTO: 43 % (ref 14–44)
MCH RBC QN AUTO: 32.6 PG (ref 26.8–34.3)
MCHC RBC AUTO-ENTMCNC: 34.9 G/DL (ref 31.4–37.4)
MCV RBC AUTO: 93 FL (ref 82–98)
MONOCYTES # BLD AUTO: 0.34 THOUSAND/ÂΜL (ref 0.17–1.22)
MONOCYTES NFR BLD AUTO: 5 % (ref 4–12)
NEUTROPHILS # BLD AUTO: 3.54 THOUSANDS/ÂΜL (ref 1.85–7.62)
NEUTS SEG NFR BLD AUTO: 51 % (ref 43–75)
NONHDLC SERPL-MCNC: 100 MG/DL
NRBC BLD AUTO-RTO: 0 /100 WBCS
PLATELET # BLD AUTO: 262 THOUSANDS/UL (ref 149–390)
PMV BLD AUTO: 9.8 FL (ref 8.9–12.7)
POTASSIUM SERPL-SCNC: 3.7 MMOL/L (ref 3.5–5.3)
PROT SERPL-MCNC: 6.7 G/DL (ref 6.4–8.4)
PTH-INTACT SERPL-MCNC: 38.6 PG/ML (ref 12–88)
RBC # BLD AUTO: 3.96 MILLION/UL (ref 3.81–5.12)
SODIUM SERPL-SCNC: 137 MMOL/L (ref 135–147)
TIBC SERPL-MCNC: 305 UG/DL (ref 250–450)
TRIGL SERPL-MCNC: 106 MG/DL (ref ?–150)
TSH SERPL DL<=0.05 MIU/L-ACNC: 2.79 UIU/ML (ref 0.45–4.5)
UIBC SERPL-MCNC: 238 UG/DL (ref 155–355)
VIT B12 SERPL-MCNC: 254 PG/ML (ref 180–914)
WBC # BLD AUTO: 6.98 THOUSAND/UL (ref 4.31–10.16)

## 2024-12-13 PROCEDURE — 85025 COMPLETE CBC W/AUTO DIFF WBC: CPT

## 2024-12-13 PROCEDURE — 82728 ASSAY OF FERRITIN: CPT

## 2024-12-13 PROCEDURE — 83540 ASSAY OF IRON: CPT

## 2024-12-13 PROCEDURE — 84630 ASSAY OF ZINC: CPT

## 2024-12-13 PROCEDURE — 80053 COMPREHEN METABOLIC PANEL: CPT

## 2024-12-13 PROCEDURE — 84590 ASSAY OF VITAMIN A: CPT

## 2024-12-13 PROCEDURE — 80061 LIPID PANEL: CPT

## 2024-12-13 PROCEDURE — 36415 COLL VENOUS BLD VENIPUNCTURE: CPT

## 2024-12-13 PROCEDURE — 83550 IRON BINDING TEST: CPT

## 2024-12-13 PROCEDURE — 82746 ASSAY OF FOLIC ACID SERUM: CPT

## 2024-12-13 PROCEDURE — 84425 ASSAY OF VITAMIN B-1: CPT

## 2024-12-13 PROCEDURE — 93880 EXTRACRANIAL BILAT STUDY: CPT

## 2024-12-13 PROCEDURE — 82607 VITAMIN B-12: CPT

## 2024-12-13 PROCEDURE — 93880 EXTRACRANIAL BILAT STUDY: CPT | Performed by: SURGERY

## 2024-12-13 PROCEDURE — 83036 HEMOGLOBIN GLYCOSYLATED A1C: CPT

## 2024-12-13 PROCEDURE — 84443 ASSAY THYROID STIM HORMONE: CPT

## 2024-12-13 PROCEDURE — 82306 VITAMIN D 25 HYDROXY: CPT

## 2024-12-13 PROCEDURE — 83970 ASSAY OF PARATHORMONE: CPT

## 2024-12-13 RX ORDER — ERGOCALCIFEROL 1.25 MG/1
50000 CAPSULE, LIQUID FILLED ORAL
Qty: 20 CAPSULE | Refills: 0 | Status: SHIPPED | OUTPATIENT
Start: 2024-12-16

## 2024-12-13 NOTE — TELEPHONE ENCOUNTER
----- Message from Malou Garcia PA-C sent at 12/13/2024  3:04 PM EST -----  -You have a vitamin D deficiency. Please start taking the vitamin D deficiency prescription that I am sending for 50,000IU 2x/week with FOOD x 10 weeks - take with food for best absorption. We will repeat vitamin D lab in about 4 months.

## 2024-12-13 NOTE — RESULT ENCOUNTER NOTE
-You have a vitamin D deficiency. Please start taking the vitamin D deficiency prescription that I am sending for 50,000IU 2x/week with FOOD x 10 weeks - take with food for best absorption. We will repeat vitamin D lab in about 4 months.

## 2024-12-16 RX ORDER — CYANOCOBALAMIN 1000 UG/ML
1000 INJECTION, SOLUTION INTRAMUSCULAR; SUBCUTANEOUS WEEKLY
Status: SHIPPED | OUTPATIENT
Start: 2024-12-16 | End: 2025-01-13

## 2024-12-17 LAB — VIT B1 BLD-SCNC: 115.4 NMOL/L (ref 66.5–200)

## 2024-12-18 LAB
VIT A SERPL-MCNC: 65.2 UG/DL (ref 20.1–62)
ZINC SERPL-MCNC: 88 UG/DL (ref 44–115)

## 2024-12-19 NOTE — RESULT ENCOUNTER NOTE
Please let her know that Vitamin A is elevated - could be lab error vs hair/skin/nails vs ETOH - avoid these if taking and repeat in 3 months thank you

## 2024-12-23 ENCOUNTER — OFFICE VISIT (OUTPATIENT)
Dept: FAMILY MEDICINE CLINIC | Facility: CLINIC | Age: 49
End: 2024-12-23
Payer: COMMERCIAL

## 2024-12-23 VITALS
HEIGHT: 62 IN | WEIGHT: 174 LBS | BODY MASS INDEX: 32.02 KG/M2 | RESPIRATION RATE: 17 BRPM | OXYGEN SATURATION: 98 % | SYSTOLIC BLOOD PRESSURE: 110 MMHG | DIASTOLIC BLOOD PRESSURE: 66 MMHG | TEMPERATURE: 98.1 F | HEART RATE: 66 BPM

## 2024-12-23 DIAGNOSIS — R42 DIZZINESS: ICD-10-CM

## 2024-12-23 DIAGNOSIS — Z71.2 ENCOUNTER TO DISCUSS TEST RESULTS: ICD-10-CM

## 2024-12-23 DIAGNOSIS — M54.12 CERVICAL RADICULOPATHY: ICD-10-CM

## 2024-12-23 DIAGNOSIS — I10 PRIMARY HYPERTENSION: ICD-10-CM

## 2024-12-23 DIAGNOSIS — M54.81 BILATERAL OCCIPITAL NEURALGIA: ICD-10-CM

## 2024-12-23 DIAGNOSIS — M47.812 CERVICAL SPONDYLOSIS: ICD-10-CM

## 2024-12-23 DIAGNOSIS — E04.1 RIGHT THYROID NODULE: Primary | ICD-10-CM

## 2024-12-23 PROCEDURE — 99214 OFFICE O/P EST MOD 30 MIN: CPT | Performed by: NURSE PRACTITIONER

## 2024-12-23 NOTE — PROGRESS NOTES
Name: Kamran Rees      : 1975      MRN: [id number]  Encounter Provider: DIANA Dodd  Encounter Date: 2024   Encounter department: [facility]  :  Assessment & Plan  Right thyroid nodule  Recommend checking ultrasound of thyroid for more specific look at right lobe nodule  Orders:    US thyroid; Future    Bilateral occipital neuralgia  Continue management per multispecialty team  I will follow along and offer my assistance where needed       Cervical radiculopathy         Cervical spondylosis         Encounter to discuss test results         Dizziness  Multifactor     Return to office in 3 to 4 months for recheck of multi symptom and discussion of plan per specialty         History of Present Illness     49-year-old female with complicated medical history presents to review recent visits with specialists-neurosurgery, pain management, neurologist, plastic surgery, vascular surgery    She will be seeing vascular neurologist in [place] in few weeks  She has ongoing headaches, neck pain, dizziness, near syncope-she is on long-term disability from work  She is frustrated at the level of difficulty of symptom control  She is hoping to gain insight when she sees vascular neurologist  Surgery has been recommended but she is undecided    She had carotid Doppler done which revealed thyroid nodule in right lobe  Chart review reveals that this has been there since -thyroid ultrasound was done at that time-annual follow-up was recommended    History obtained from chart review and the patient        Review of Systems   Constitutional:  Positive for fatigue. Negative for fever.   HENT:  Negative for trouble swallowing.    Respiratory: Negative.     Cardiovascular: Negative.    Gastrointestinal: Negative.    Endocrine: Negative.    Genitourinary:  Negative for difficulty urinating and dysuria.        S/p partial hysterectomy   Musculoskeletal:  Positive for arthralgias, back  "pain and neck pain.   Skin: Negative.    Neurological:  Positive for numbness and headaches. Negative for weakness.   Hematological:  Does not bruise/bleed easily.   Psychiatric/Behavioral:  Positive for sleep disturbance. The patient is nervous/anxious.        Objective   /66 (BP Location: Left arm, Patient Position: Sitting, Cuff Size: Large)   Pulse 66   Temp 98.1 °F (36.7 °C) (Temporal)   Resp 17   Ht 5' 2\" (1.575 m)   Wt 78.9 kg (174 lb)   SpO2 98%   BMI 31.83 kg/m²      Physical Exam  Vitals and nursing note reviewed.   Constitutional:       General: She is not in acute distress.     Appearance: Normal appearance.   Skin:     General: Skin is warm and dry.   Neurological:      General: No focal deficit present.      Mental Status: She is alert.   Psychiatric:         Mood and Affect: Mood normal. Affect is tearful.         Behavior: Behavior normal.       Administrative Statements   I have spent a total time of 30 minutes in caring for this patient on the day of the visit/encounter including Diagnostic results, Prognosis, Risks and benefits of tx options, Instructions for management, Patient and family education, Importance of tx compliance, Risk factor reductions, Impressions, Counseling / Coordination of care, Documenting in the medical record, Reviewing / ordering tests, medicine, procedures  , and Obtaining or reviewing history  .   "

## 2024-12-23 NOTE — ASSESSMENT & PLAN NOTE
Multifactor     Return to office in 3 to 4 months for recheck of multi symptom and discussion of plan per specialty

## 2024-12-23 NOTE — ASSESSMENT & PLAN NOTE
Continue management per multispecialty team  I will follow along and offer my assistance where needed

## 2025-01-11 PROBLEM — Z12.11 COLON CANCER SCREENING: Status: RESOLVED | Noted: 2024-12-12 | Resolved: 2025-01-11

## 2025-01-16 ENCOUNTER — HOSPITAL ENCOUNTER (OUTPATIENT)
Dept: ULTRASOUND IMAGING | Facility: HOSPITAL | Age: 50
End: 2025-01-16
Payer: COMMERCIAL

## 2025-01-16 DIAGNOSIS — E04.1 RIGHT THYROID NODULE: ICD-10-CM

## 2025-01-16 PROCEDURE — 76536 US EXAM OF HEAD AND NECK: CPT

## 2025-01-17 ENCOUNTER — TELEPHONE (OUTPATIENT)
Age: 50
End: 2025-01-17

## 2025-01-17 ENCOUNTER — PREP FOR PROCEDURE (OUTPATIENT)
Dept: GASTROENTEROLOGY | Facility: CLINIC | Age: 50
End: 2025-01-17

## 2025-01-17 DIAGNOSIS — K91.2 POSTSURGICAL MALABSORPTION: Primary | ICD-10-CM

## 2025-01-17 RX ORDER — SODIUM CHLORIDE, SODIUM LACTATE, POTASSIUM CHLORIDE, CALCIUM CHLORIDE 600; 310; 30; 20 MG/100ML; MG/100ML; MG/100ML; MG/100ML
125 INJECTION, SOLUTION INTRAVENOUS CONTINUOUS
OUTPATIENT
Start: 2025-01-17

## 2025-01-17 NOTE — TELEPHONE ENCOUNTER
Patients GI provider:  SHERYL Storey    Number to return call: [phone]    Reason for call: Pt calling to advise referral from weight management included EGD as well. Not seeing an order in from GI for EGD. Please check w/Eliza if ok to have pt sched for EGD. If so, please have order entered and call pt to sched.    Scheduled procedure/appointment date if applicable: procedure 01/31/2025

## 2025-01-17 NOTE — TELEPHONE ENCOUNTER
"PT called in. Reports she received a call from GI lab for pre-procedure instructions, but only received instructions for colonoscopy. PT thought she was also having an endoscopy completed as well, ordered by JIMMIE Garcia PA-C.     Reviewed last office visit with JIMMIE Garcia PA-C and noted \"Needs surveillance EGD to assess esophagus and r/o Green[place]'s. Also  needs colonoscopy - will refer to GI for both.\" Advised PT to call GI, phone number provided. PT verbalized understanding and agreeable to plan.  "

## 2025-01-17 NOTE — TELEPHONE ENCOUNTER
I placed order for an EGD please add procedure at time of  her colonoscopy. Please call patient she was asymptomatic when I saw her I did put in diagnosis of postsurgical malabsorption .  She may want to check with her insurance company if EGD will be covered

## 2025-01-20 ENCOUNTER — RESULTS FOLLOW-UP (OUTPATIENT)
Dept: FAMILY MEDICINE CLINIC | Facility: CLINIC | Age: 50
End: 2025-01-20

## 2025-01-20 DIAGNOSIS — E04.1 THYROID NODULE GREATER THAN OR EQUAL TO 1.5 CM IN DIAMETER INCIDENTALLY NOTED ON IMAGING STUDY: Primary | ICD-10-CM

## 2025-01-21 NOTE — RESULT ENCOUNTER NOTE
Called and spoke w pt -- informed of PCP note and pt was agreeable. She was provided the number for central scheduling at time of call. NFA needed at this time.

## 2025-01-22 ENCOUNTER — TELEPHONE (OUTPATIENT)
Age: 50
End: 2025-01-22

## 2025-01-22 NOTE — TELEPHONE ENCOUNTER
Contacted pt off Medication Management wait list in regards to verify needs of service and offer an appt. Pt stated that she is no longer interested in med mgmt services but still talk therapy. Pt did not have time to complete intake and schedule. Provided pt intake dept number to call back.      Pt removed from med mgmt wait list.     First Attempt to schedule.

## 2025-01-31 ENCOUNTER — ANESTHESIA EVENT (OUTPATIENT)
Dept: GASTROENTEROLOGY | Facility: HOSPITAL | Age: 50
End: 2025-01-31
Payer: COMMERCIAL

## 2025-01-31 ENCOUNTER — HOSPITAL ENCOUNTER (OUTPATIENT)
Dept: GASTROENTEROLOGY | Facility: HOSPITAL | Age: 50
Setting detail: OUTPATIENT SURGERY
End: 2025-01-31
Payer: COMMERCIAL

## 2025-01-31 ENCOUNTER — ANESTHESIA (OUTPATIENT)
Dept: GASTROENTEROLOGY | Facility: HOSPITAL | Age: 50
End: 2025-01-31
Payer: COMMERCIAL

## 2025-01-31 VITALS
HEIGHT: 62 IN | WEIGHT: 174.9 LBS | BODY MASS INDEX: 32.18 KG/M2 | SYSTOLIC BLOOD PRESSURE: 103 MMHG | TEMPERATURE: 97.4 F | RESPIRATION RATE: 15 BRPM | HEART RATE: 73 BPM | DIASTOLIC BLOOD PRESSURE: 59 MMHG | OXYGEN SATURATION: 99 %

## 2025-01-31 DIAGNOSIS — K91.2 POSTSURGICAL MALABSORPTION: ICD-10-CM

## 2025-01-31 DIAGNOSIS — Z12.11 COLON CANCER SCREENING: ICD-10-CM

## 2025-01-31 PROCEDURE — 45385 COLONOSCOPY W/LESION REMOVAL: CPT | Performed by: INTERNAL MEDICINE

## 2025-01-31 PROCEDURE — 43239 EGD BIOPSY SINGLE/MULTIPLE: CPT | Performed by: INTERNAL MEDICINE

## 2025-01-31 PROCEDURE — 88305 TISSUE EXAM BY PATHOLOGIST: CPT | Performed by: PATHOLOGY

## 2025-01-31 RX ORDER — PROPOFOL 10 MG/ML
INJECTION, EMULSION INTRAVENOUS AS NEEDED
Status: DISCONTINUED | OUTPATIENT
Start: 2025-01-31 | End: 2025-01-31

## 2025-01-31 RX ORDER — SODIUM CHLORIDE, SODIUM LACTATE, POTASSIUM CHLORIDE, CALCIUM CHLORIDE 600; 310; 30; 20 MG/100ML; MG/100ML; MG/100ML; MG/100ML
INJECTION, SOLUTION INTRAVENOUS CONTINUOUS PRN
Status: DISCONTINUED | OUTPATIENT
Start: 2025-01-31 | End: 2025-01-31

## 2025-01-31 RX ORDER — LIDOCAINE HYDROCHLORIDE 20 MG/ML
INJECTION, SOLUTION EPIDURAL; INFILTRATION; INTRACAUDAL; PERINEURAL AS NEEDED
Status: DISCONTINUED | OUTPATIENT
Start: 2025-01-31 | End: 2025-01-31

## 2025-01-31 RX ADMIN — PROPOFOL 50 MG: 10 INJECTION, EMULSION INTRAVENOUS at 09:36

## 2025-01-31 RX ADMIN — PROPOFOL 50 MG: 10 INJECTION, EMULSION INTRAVENOUS at 09:40

## 2025-01-31 RX ADMIN — SODIUM CHLORIDE, SODIUM LACTATE, POTASSIUM CHLORIDE, AND CALCIUM CHLORIDE: .6; .31; .03; .02 INJECTION, SOLUTION INTRAVENOUS at 09:15

## 2025-01-31 RX ADMIN — PROPOFOL 50 MG: 10 INJECTION, EMULSION INTRAVENOUS at 09:52

## 2025-01-31 RX ADMIN — PROPOFOL 50 MG: 10 INJECTION, EMULSION INTRAVENOUS at 09:45

## 2025-01-31 RX ADMIN — PROPOFOL 50 MG: 10 INJECTION, EMULSION INTRAVENOUS at 09:31

## 2025-01-31 RX ADMIN — PROPOFOL 50 MG: 10 INJECTION, EMULSION INTRAVENOUS at 09:33

## 2025-01-31 RX ADMIN — PROPOFOL 50 MG: 10 INJECTION, EMULSION INTRAVENOUS at 09:43

## 2025-01-31 RX ADMIN — PROPOFOL 150 MG: 10 INJECTION, EMULSION INTRAVENOUS at 09:29

## 2025-01-31 RX ADMIN — LIDOCAINE HYDROCHLORIDE 100 MG: 20 INJECTION, SOLUTION EPIDURAL; INFILTRATION; INTRACAUDAL; PERINEURAL at 09:29

## 2025-01-31 NOTE — H&P
History and Physical - [facility]  Kamran Rees 49 y.o. female MRN: [id number]                  HPI: Kamran Rees is a 49 y.o. year old female who presents for colon cancer screening and abd pain      REVIEW OF SYSTEMS: Per the HPI, and otherwise unremarkable.    Historical Information   Past Medical History:   Diagnosis Date    Acid reflux     Asthma     Baker cyst, left     Constipation     Cough     CPAP (continuous positive airway pressure) dependence     Digestive disorder     Fatigue     Headache     Headache(784.0)     History of mammogram 07/17/2018    Increased urinary frequency     Joint pain     Migraine     Obesity (BMI 30-39.9)     Postgastrectomy malabsorption     Seasonal allergies     Shortness of breath     Sleep apnea     Sleep difficulties     Vertigo      Past Surgical History:   Procedure Laterality Date    CARPAL TUNNEL RELEASE      EGD      ENDOMETRIAL ABLATION W/ NOVASURE  2014    Laparscopy, D&C, Novasure ablation    EPIDURAL BLOCK INJECTION N/A 02/02/2024    Procedure: C7-T1 CERVICAL EPIDURAL STEROID INJECTION;  Surgeon: Ronni Landry MD;  Location: [facility] MAIN OR;  Service: Pain Management     FOOT SURGERY      HYSTERECTOMY  12/20/2016    B/L salpingectomy    KNEE SURGERY      KNEE SURGERY      05/2024    NERVE BLOCK N/A 04/12/2024    Procedure: TON C3 C4 MEDIAL BRANCH BLOCK #1;  Surgeon: Ronni Landry MD;  Location: [facility] MAIN OR;  Service: Pain Management     NERVE BLOCK N/A 04/26/2024    Procedure: TON C3 C4 MEDIAL BRANCH BLCOK;  Surgeon: Ronni Landry MD;  Location: [facility] MAIN OR;  Service: Pain Management     PARTIAL HYSTERECTOMY      MN LAPS GSTRC RSTRICTIV PX LONGITUDINAL GASTRECTOMY N/A 08/04/2020    Procedure: LAPAROSCOPIC SLEEVE  GASTRECTOMY;  Surgeon: Gerson Engel MD;  Location: [facility] MAIN OR;  Service: Bariatrics    RADIOFREQUENCY ABLATION Right 05/16/2024    Procedure: RIGHT TON C3 C4 RADIO FREQUENCY ABLATION;  Surgeon: Ronni Landry MD;   Location: [facility] MAIN OR;  Service: Pain Management     RADIOFREQUENCY ABLATION Left 05/31/2024    Procedure: LEFT TON C3 C4 RADIO FREQUENCY ABLATION;  Surgeon: Ronni Landry MD;  Location: [facility] MAIN OR;  Service: Pain Management     TENDON REPAIR  2016    TUBAL LIGATION  08/06/2010    Laparoscopy tubal cautery     Social History   Social History     Substance and Sexual Activity   Alcohol Use Yes    Comment: Occasionally     Social History     Substance and Sexual Activity   Drug Use No     Social History     Tobacco Use   Smoking Status Never   Smokeless Tobacco Never     Family History   Problem Relation Age of Onset    No Known Problems Mother     Canavan disease Father     Cancer Father     Breast cancer Maternal Grandmother     Breast cancer Paternal Grandmother        Meds/Allergies       Current Outpatient Medications:     budesonide-formoterol (SYMBICORT) 80-4.5 MCG/ACT inhaler    Calcium-Vitamin D-Vitamin K 650-12.5-40 MG-MCG-MCG CHEW    clonazePAM (KlonoPIN) 0.5 mg tablet    cyclobenzaprine (FLEXERIL) 5 mg tablet    ergocalciferol (VITAMIN D2) 50,000 units    fluticasone (FLONASE) 50 mcg/act nasal spray    HYDROcodone-acetaminophen (NORCO) 5-325 mg per tablet    ipratropium-albuterol (DUO-NEB) 0.5-2.5 mg/3 mL nebulizer solution    losartan-hydrochlorothiazide (HYZAAR) 50-12.5 mg per tablet    promethazine-dextromethorphan (PHENERGAN-DM) 6.25-15 mg/5 mL oral syrup    albuterol (PROVENTIL HFA,VENTOLIN HFA) 90 mcg/act inhaler    azelastine (ASTELIN) 0.1 % nasal spray    Multiple Vitamin (multivitamin) tablet    polyethylene glycol (GOLYTELY) 4000 mL solution    triamcinolone (KENALOG) 0.025 % cream    Allergies   Allergen Reactions    Meloxicam Nausea Only       Objective     There were no vitals taken for this visit.      PHYSICAL EXAM    Gen: NAD  Head: NCAT  CV: RRR  CHEST: Clear  ABD: soft, NT/ND  EXT: no edema      ASSESSMENT/PLAN:  This is a 49 y.o. year old female here for colonoscopy and EGD, and  she is stable and optimized for her procedure.

## 2025-01-31 NOTE — ANESTHESIA PREPROCEDURE EVALUATION
Procedure:  COLONOSCOPY  EGD    Relevant Problems   ANESTHESIA   (-) History of anesthesia complications      CARDIO   (+) Primary hypertension   (-) Chest pain   (-) HERNANDEZ (dyspnea on exertion)      GYN   (+) History of hysterectomy      MUSCULOSKELETAL   (+) Cervical spondylosis   (+) Primary osteoarthritis of right knee      NEURO/PSYCH   (+) Anxiety   (+) Bilateral occipital neuralgia   (+) Cervicogenic headache   (+) Chronic headaches      PULMONARY   (+) Asthma   (+) Mild intermittent asthma without complication   (+) Moderate obstructive sleep apnea   (+) Moderate persistent asthma without complication   (-) Shortness of breath   (-) URI (upper respiratory infection)        Physical Exam    Airway    Mallampati score: II  TM Distance: >3 FB  Neck ROM: full     Dental       Cardiovascular      Pulmonary      Other Findings  post-pubertal.      Anesthesia Plan  ASA Score- 2     Anesthesia Type- IV sedation with anesthesia with ASA Monitors.         Additional Monitors:     Airway Plan:            Plan Factors-Exercise tolerance (METS): >4 METS.    Chart reviewed. EKG reviewed.  Existing labs reviewed. Patient summary reviewed.                  Induction- intravenous.    Postoperative Plan-         Informed Consent- Anesthetic plan and risks discussed with patient.  I personally reviewed this patient with the CRNA. Discussed and agreed on the Anesthesia Plan with the CRNA..      NPO Status:  No vitals data found for the desired time range.

## 2025-01-31 NOTE — ANESTHESIA POSTPROCEDURE EVALUATION
Post-Op Assessment Note    CV Status:  Stable  Pain Score: 0    Pain management: adequate       Mental Status:  Sleepy and arousable   Hydration Status:  Stable   PONV Controlled:  Controlled   Airway Patency:  Patent     Post Op Vitals Reviewed: Yes    No anethesia notable event occurred.    Staff: CRNA           Last Filed PACU Vitals:  Vitals Value Taken Time   Temp 97.8    Pulse 75    /69    Resp 11    SpO2 98             Patient awake and attempting to rub her eyes.  Reinforced that she should not do so, pacu RN made aware

## 2025-02-05 ENCOUNTER — OFFICE VISIT (OUTPATIENT)
Dept: URGENT CARE | Facility: CLINIC | Age: 50
End: 2025-02-05
Payer: COMMERCIAL

## 2025-02-05 VITALS
RESPIRATION RATE: 19 BRPM | TEMPERATURE: 97.5 F | OXYGEN SATURATION: 100 % | DIASTOLIC BLOOD PRESSURE: 84 MMHG | HEART RATE: 88 BPM | WEIGHT: 176.13 LBS | BODY MASS INDEX: 32.41 KG/M2 | HEIGHT: 62 IN | SYSTOLIC BLOOD PRESSURE: 122 MMHG

## 2025-02-05 DIAGNOSIS — L23.9 ALLERGIC CONTACT DERMATITIS, UNSPECIFIED TRIGGER: Primary | ICD-10-CM

## 2025-02-05 PROBLEM — G47.33 MODERATE OBSTRUCTIVE SLEEP APNEA: Status: RESOLVED | Noted: 2019-11-27 | Resolved: 2025-02-05

## 2025-02-05 PROBLEM — K91.2 POSTSURGICAL MALABSORPTION: Status: RESOLVED | Noted: 2020-11-18 | Resolved: 2025-02-05

## 2025-02-05 PROBLEM — G56.03 BILATERAL CARPAL TUNNEL SYNDROME: Status: ACTIVE | Noted: 2025-02-05

## 2025-02-05 PROBLEM — H93.8X9 CLOGGED EAR, UNSPECIFIED LATERALITY: Status: ACTIVE | Noted: 2024-08-26

## 2025-02-05 PROCEDURE — 99203 OFFICE O/P NEW LOW 30 MIN: CPT

## 2025-02-05 RX ORDER — PREDNISONE 20 MG/1
40 TABLET ORAL DAILY
Qty: 10 TABLET | Refills: 0 | Status: SHIPPED | OUTPATIENT
Start: 2025-02-05 | End: 2025-02-10

## 2025-02-05 RX ORDER — TRIAMCINOLONE ACETONIDE 0.25 MG/G
CREAM TOPICAL 2 TIMES DAILY
Qty: 80 G | Refills: 0 | Status: SHIPPED | OUTPATIENT
Start: 2025-02-05

## 2025-02-05 RX ORDER — INDOMETHACIN 25 MG/1
25 CAPSULE ORAL
COMMUNITY
Start: 2025-01-21 | End: 2025-04-21

## 2025-02-05 NOTE — PROGRESS NOTES
[facility]        NAME: Kamran Rees is a 49 y.o. female  : 1975    MRN: [id number]  DATE: 2025  TIME: 7:05 PM    Assessment and Plan   Allergic contact dermatitis, unspecified trigger [L23.9]  1. Allergic contact dermatitis, unspecified trigger  predniSONE 20 mg tablet    triamcinolone (KENALOG) 0.025 % cream        PE consistent with dermatitis - unknown source. Topcial and oral prednisone as directed as requested. VSS in clinic, appears in no acute distress. Educated on use of OTC products for additional relief of symptoms. Advised close follow-up with PCP or to report to the ER if symptoms worsen. Patient verbalizes understanding and agreeable to plan.        Patient Instructions     Take steroid as directed for next 5 days and apply cream as directed for up to two weeks. Take benadryl before bed to avoid overnight itching. Follow-up with PCP in 3-5 days if no improvement of symptoms. Report to the ER sooner if symptoms worsen    Chief Complaint     Chief Complaint   Patient presents with    Rash     Red rash on both hands         History of Present Illness       49 year old female presents for evaluation of rash on both hands x 1 day. She denies any known triggers (medications, food, cosmetics) but reports h/o similar rash in 2023 for which she was prescribed oral prednisone and kenalog cream with improvement of symptoms. She relates the rash is itchy but not painful. She reports moderate swelling, which was worse this morning, but seems to have improved since taking benadryl. She denies fevers or discharges. She has not tried any additional interventions for symptoms.     Rash  This is a new problem. The current episode started in the past 7 days. The problem is unchanged. The affected locations include the left hand and right hand. The problem is moderate. The rash is characterized by dryness, pain, itchiness and redness. It is unknown if there was an exposure to a  precipitant. The rash first occurred at home. Associated symptoms include itching. Pertinent negatives include no anorexia, congestion, cough, decreased physical activity, decreased responsiveness, decreased sleep, drinking less, diarrhea, facial edema, fatigue, fever, joint pain, rhinorrhea, shortness of breath, sore throat or vomiting. Past treatments include antihistamine. The treatment provided mild relief. There is no history of allergies, asthma, eczema or varicella. There were no sick contacts.       Review of Systems   Review of Systems   Constitutional:  Negative for activity change, appetite change, chills, decreased responsiveness, fatigue and fever.   HENT:  Negative for congestion, rhinorrhea and sore throat.    Respiratory:  Negative for cough, chest tightness and shortness of breath.    Cardiovascular:  Negative for chest pain and palpitations.   Gastrointestinal:  Negative for abdominal pain, anorexia, diarrhea and vomiting.   Musculoskeletal:  Negative for arthralgias, back pain, joint pain, myalgias and neck pain.   Skin:  Positive for color change, itching and rash. Negative for pallor and wound.   Allergic/Immunologic: Negative for environmental allergies and food allergies.   Neurological:  Negative for dizziness, light-headedness and headaches.         Current Medications       Current Outpatient Medications:     albuterol (PROVENTIL HFA,VENTOLIN HFA) 90 mcg/act inhaler, Inhale 2 puffs 4 (four) times a day, Disp: 18 g, Rfl: 3    azelastine (ASTELIN) 0.1 % nasal spray, 1 spray into each nostril 2 (two) times a day Use in each nostril as directed, Disp: 1 mL, Rfl: 2    budesonide-formoterol (SYMBICORT) 80-4.5 MCG/ACT inhaler, Inhale 2 puffs 2 (two) times a day Rinse mouth after use., Disp: 10.2 g, Rfl: 3    Calcium-Vitamin D-Vitamin K 650-12.5-40 MG-MCG-MCG CHEW, Chew 1 tablet, Disp: , Rfl:     clonazePAM (KlonoPIN) 0.5 mg tablet, Take 0.5 mg by mouth as needed, Disp: , Rfl:     cyclobenzaprine  (FLEXERIL) 5 mg tablet, Take 5-10 mg by mouth daily at bedtime as needed, Disp: , Rfl:     ergocalciferol (VITAMIN D2) 50,000 units, Take 1 capsule (50,000 Units total) by mouth 2 (two) times a week with meals, Disp: 20 capsule, Rfl: 0    fluticasone (FLONASE) 50 mcg/act nasal spray, 1 spray into each nostril 2 (two) times a day, Disp: 1 g, Rfl: 6    HYDROcodone-acetaminophen (NORCO) 5-325 mg per tablet, Every 6 hours, Disp: , Rfl:     indomethacin (INDOCIN) 25 mg capsule, Take 25 mg by mouth, Disp: , Rfl:     ipratropium-albuterol (DUO-NEB) 0.5-2.5 mg/3 mL nebulizer solution, , Disp: , Rfl:     losartan-hydrochlorothiazide (HYZAAR) 50-12.5 mg per tablet, Take 1 tablet by mouth daily, Disp: , Rfl:     Multiple Vitamin (multivitamin) tablet, Take 1 tablet by mouth daily Bariatric, Disp: , Rfl:     predniSONE 20 mg tablet, Take 2 tablets (40 mg total) by mouth daily for 5 days, Disp: 10 tablet, Rfl: 0    promethazine-dextromethorphan (PHENERGAN-DM) 6.25-15 mg/5 mL oral syrup, Take 5 mL by mouth 4 (four) times a day as needed for cough, Disp: 180 mL, Rfl: 0    triamcinolone (KENALOG) 0.025 % cream, Apply topically 2 (two) times a day, Disp: 80 g, Rfl: 0    polyethylene glycol (GOLYTELY) 4000 mL solution, Take 4,000 mL by mouth once for 1 dose Take as directed prior to procedure, Disp: 4000 mL, Rfl: 0    Current Allergies     Allergies as of 02/05/2025 - Reviewed 02/05/2025   Allergen Reaction Noted    Meloxicam Nausea Only 08/04/2020            The following portions of the patient's history were reviewed and updated as appropriate: allergies, current medications, past family history, past medical history, past social history, past surgical history and problem list.     Past Medical History:   Diagnosis Date    Acid reflux     Asthma     Baker cyst, left     Constipation     Cough     CPAP (continuous positive airway pressure) dependence     Digestive disorder     Fatigue     Headache     Headache(784.0)     History of  mammogram 07/17/2018    Increased urinary frequency     Joint pain     Migraine     Obesity (BMI 30-39.9)     Postgastrectomy malabsorption     Seasonal allergies     Shortness of breath     Sleep apnea     Sleep difficulties     Vertigo        Past Surgical History:   Procedure Laterality Date    CARPAL TUNNEL RELEASE      EGD      ENDOMETRIAL ABLATION W/ NOVASURE  2014    Laparscopy, D&C, Novasure ablation    EPIDURAL BLOCK INJECTION N/A 02/02/2024    Procedure: C7-T1 CERVICAL EPIDURAL STEROID INJECTION;  Surgeon: Ronni Landry MD;  Location: [facility] MAIN OR;  Service: Pain Management     FOOT SURGERY      HYSTERECTOMY  12/20/2016    B/L salpingectomy    KNEE SURGERY      KNEE SURGERY      05/2024    NERVE BLOCK N/A 04/12/2024    Procedure: TON C3 C4 MEDIAL BRANCH BLOCK #1;  Surgeon: Ronni Landry MD;  Location: [facility] MAIN OR;  Service: Pain Management     NERVE BLOCK N/A 04/26/2024    Procedure: TON C3 C4 MEDIAL BRANCH BLCOK;  Surgeon: Ronni Lanrdy MD;  Location: [facility] MAIN OR;  Service: Pain Management     PARTIAL HYSTERECTOMY      CT LAPS GSTRC RSTRICTIV PX LONGITUDINAL GASTRECTOMY N/A 08/04/2020    Procedure: LAPAROSCOPIC SLEEVE  GASTRECTOMY;  Surgeon: Gerson Engel MD;  Location: [facility] MAIN OR;  Service: Bariatrics    RADIOFREQUENCY ABLATION Right 05/16/2024    Procedure: RIGHT TON C3 C4 RADIO FREQUENCY ABLATION;  Surgeon: Ronni Landry MD;  Location: [facility] MAIN OR;  Service: Pain Management     RADIOFREQUENCY ABLATION Left 05/31/2024    Procedure: LEFT TON C3 C4 RADIO FREQUENCY ABLATION;  Surgeon: Ronni Landry MD;  Location: [facility] MAIN OR;  Service: Pain Management     TENDON REPAIR  2016    TUBAL LIGATION  08/06/2010    Laparoscopy tubal cautery       Family History   Problem Relation Age of Onset    No Known Problems Mother     Canavan disease Father     Cancer Father     Breast cancer Maternal Grandmother     Breast cancer Paternal Grandmother          Medications have been  "verified.        Objective   /84 (BP Location: Left arm, Patient Position: Sitting, Cuff Size: Large)   Pulse 88   Temp 97.5 °F (36.4 °C) (Temporal)   Resp 19   Ht 5' 2\" (1.575 m)   Wt 79.9 kg (176 lb 2 oz)   SpO2 100%   BMI 32.21 kg/m²        Physical Exam     Physical Exam  Vitals and nursing note reviewed.   Constitutional:       General: She is awake. She is not in acute distress.     Appearance: Normal appearance. She is well-developed and normal weight.   HENT:      Head: Normocephalic and atraumatic.   Cardiovascular:      Rate and Rhythm: Normal rate.      Pulses: Normal pulses.   Pulmonary:      Effort: Pulmonary effort is normal.   Musculoskeletal:      Cervical back: Normal range of motion and neck supple.   Skin:     General: Skin is warm and dry.      Findings: Erythema and rash present. No bruising or ecchymosis. Rash is macular.      Comments: Slightly raised red rash to dorsal surface of both hands   Neurological:      General: No focal deficit present.      Mental Status: She is alert and oriented to person, place, and time.   Psychiatric:         Mood and Affect: Mood normal.         Behavior: Behavior normal. Behavior is cooperative.         Thought Content: Thought content normal.         Judgment: Judgment normal.                   "

## 2025-02-06 PROCEDURE — 88305 TISSUE EXAM BY PATHOLOGIST: CPT | Performed by: PATHOLOGY

## 2025-02-06 NOTE — PATIENT INSTRUCTIONS
Take steroid as directed for next 5 days and apply cream as directed for up to two weeks. Take benadryl before bed to avoid overnight itching. Follow-up with PCP in 3-5 days if no improvement of symptoms. Report to the ER sooner if symptoms worsen.

## 2025-02-11 ENCOUNTER — RESULTS FOLLOW-UP (OUTPATIENT)
Dept: GASTROENTEROLOGY | Facility: CLINIC | Age: 50
End: 2025-02-11

## 2025-02-11 NOTE — RESULT ENCOUNTER NOTE
Please call the patient regarding results.  Biopsies were all entirely benign.  Repeat colonoscopy in 10 years

## 2025-02-13 ENCOUNTER — HOSPITAL ENCOUNTER (OUTPATIENT)
Dept: ULTRASOUND IMAGING | Facility: HOSPITAL | Age: 50
Discharge: HOME | End: 2025-02-13
Payer: COMMERCIAL

## 2025-02-13 DIAGNOSIS — E04.1 THYROID NODULE GREATER THAN OR EQUAL TO 1.5 CM IN DIAMETER INCIDENTALLY NOTED ON IMAGING STUDY: ICD-10-CM

## 2025-02-13 PROCEDURE — 10005 FNA BX W/US GDN 1ST LES: CPT

## 2025-02-13 PROCEDURE — 88173 CYTOPATH EVAL FNA REPORT: CPT | Performed by: STUDENT IN AN ORGANIZED HEALTH CARE EDUCATION/TRAINING PROGRAM

## 2025-02-13 RX ORDER — LIDOCAINE HYDROCHLORIDE 10 MG/ML
5 INJECTION, SOLUTION EPIDURAL; INFILTRATION; INTRACAUDAL; PERINEURAL ONCE
Status: DISCONTINUED | OUTPATIENT
Start: 2025-02-13 | End: 2025-02-14 | Stop reason: HOSPADM

## 2025-02-14 PROCEDURE — 88173 CYTOPATH EVAL FNA REPORT: CPT | Performed by: STUDENT IN AN ORGANIZED HEALTH CARE EDUCATION/TRAINING PROGRAM

## 2025-02-17 ENCOUNTER — TELEPHONE (OUTPATIENT)
Age: 50
End: 2025-02-17

## 2025-02-17 DIAGNOSIS — I10 PRIMARY HYPERTENSION: Primary | ICD-10-CM

## 2025-02-17 RX ORDER — LOSARTAN POTASSIUM AND HYDROCHLOROTHIAZIDE 12.5; 5 MG/1; MG/1
1 TABLET ORAL DAILY
Qty: 90 TABLET | Refills: 1 | Status: SHIPPED | OUTPATIENT
Start: 2025-02-17

## 2025-02-17 NOTE — TELEPHONE ENCOUNTER
Patient on wait list for talk therapy services. Writer reached out to verify if Pt is still interested in service, and if would like to remain on [facility]. Spoke to Pt whom would like to remain on [facility]. Insurance was verified w/ Pt.

## 2025-02-17 NOTE — TELEPHONE ENCOUNTER
Reason for call:   [x] Refill   [] Prior Auth  [] Other:     Office:   [x] PCP/Provider - tyron Mcdonough - luisa PCP  [] Specialty/Provider -     Medication: losartan-hydrochlorothiazide (HYZAAR) 50-12.5 mg     Dose/Frequency: 1 daily    Quantity: 90    Pharmacy: [facility]    Does the patient have enough for 3 days?   [x] Yes   [] No - Send as HP to POD

## 2025-02-19 DIAGNOSIS — K91.2 POSTSURGICAL MALABSORPTION: ICD-10-CM

## 2025-02-19 DIAGNOSIS — E55.9 VITAMIN D INSUFFICIENCY: ICD-10-CM

## 2025-02-19 RX ORDER — ERGOCALCIFEROL 1.25 MG/1
CAPSULE, LIQUID FILLED ORAL
Qty: 20 CAPSULE | Refills: 0 | OUTPATIENT
Start: 2025-02-19

## 2025-02-19 NOTE — PROGRESS NOTES
Annual Exam Pre-charting    Last Annual Exam: 2024    Last PAP/HPV Test and Result: 2/3/2023 PAP/HPV- Neg    Last Mammo Screenin/15/2022    Last STD Culture Screenin/3/2023    Current BC Method: Hysterectomy

## 2025-02-20 ENCOUNTER — ANNUAL EXAM (OUTPATIENT)
Dept: OBGYN CLINIC | Facility: CLINIC | Age: 50
End: 2025-02-20
Payer: COMMERCIAL

## 2025-02-20 ENCOUNTER — TELEPHONE (OUTPATIENT)
Dept: OBGYN CLINIC | Facility: CLINIC | Age: 50
End: 2025-02-20

## 2025-02-20 VITALS
SYSTOLIC BLOOD PRESSURE: 116 MMHG | DIASTOLIC BLOOD PRESSURE: 66 MMHG | HEIGHT: 62 IN | WEIGHT: 181 LBS | BODY MASS INDEX: 33.31 KG/M2

## 2025-02-20 DIAGNOSIS — Z12.31 ENCOUNTER FOR SCREENING MAMMOGRAM FOR BREAST CANCER: ICD-10-CM

## 2025-02-20 DIAGNOSIS — Z01.419 ENCOUNTER FOR GYNECOLOGICAL EXAMINATION WITHOUT ABNORMAL FINDING: Primary | ICD-10-CM

## 2025-02-20 PROBLEM — Z48.815 ENCOUNTER FOR SURGICAL AFTERCARE FOLLOWING SURGERY OF DIGESTIVE SYSTEM: Status: RESOLVED | Noted: 2021-09-29 | Resolved: 2025-02-20

## 2025-02-20 PROCEDURE — S0612 ANNUAL GYNECOLOGICAL EXAMINA: HCPCS | Performed by: PHYSICIAN ASSISTANT

## 2025-02-20 NOTE — PROGRESS NOTES
":  Assessment & Plan  Encounter for gynecological examination without abnormal finding         Encounter for screening mammogram for breast cancer    Orders:    Mammo screening bilateral w 3d and cad; Future    Pap not indicated.  Order for mammogram entered.  If no problems, patient to return in 1 year for routine gyn care.    History of Present Illness     Kamran Rees is a 49 y.o. female   Patient is here for yearly gyn exam.  States she is doing well overall.  S/p hysterectomy.  Denies bowel/bladder changes, vaginal bleeding, pelvic pain, bloating, abdominal pain, n/v, change in appetite, and thyroid disease.     Mammogram scheduled for next week.  Patient is performing self-breast exam.  Denies new masses, skin changes, nipple discharge, and pain/tenderness.        Review of Systems   Constitutional:  Negative for appetite change and unexpected weight change.   Cardiovascular:         No masses, skin changes, nipple discharge, and pain/tenderness.   Gastrointestinal:  Negative for abdominal distention, abdominal pain, constipation, diarrhea, nausea and vomiting.   Genitourinary:  Negative for difficulty urinating, dysuria, frequency, genital sores, hematuria, menstrual problem, pelvic pain, urgency, vaginal bleeding, vaginal discharge and vaginal pain.     Objective   /66 (BP Location: Left arm, Patient Position: Sitting, Cuff Size: Adult)   Ht 5' 2\" (1.575 m)   Wt 82.1 kg (181 lb)   BMI 33.11 kg/m²      Physical Exam  Vitals reviewed. Exam conducted with a chaperone present.   Constitutional:       Appearance: Normal appearance. She is well-developed.   Neck:      Thyroid: No thyromegaly.   Pulmonary:      Effort: Pulmonary effort is normal.   Chest:   Breasts:     Breasts are symmetrical.      Right: Normal. No swelling, bleeding, inverted nipple, mass, nipple discharge, skin change or tenderness.      Left: Normal. No swelling, bleeding, inverted nipple, mass, nipple discharge, skin change or " tenderness.   Abdominal:      General: There is no distension.      Palpations: Abdomen is soft.      Tenderness: There is no abdominal tenderness.   Genitourinary:     General: Normal vulva.      Pubic Area: No rash.       Labia:         Right: No rash, tenderness, lesion or injury.         Left: No rash, tenderness, lesion or injury.       Vagina: Normal. No vaginal discharge, erythema, tenderness or bleeding.      Uterus: Absent.       Adnexa: Right adnexa normal and left adnexa normal.        Right: No mass, tenderness or fullness.          Left: No mass, tenderness or fullness.        Comments: Cervix and uterus surgically absent.  Musculoskeletal:      Cervical back: Neck supple.   Lymphadenopathy:      Cervical: No cervical adenopathy.      Upper Body:      Right upper body: No supraclavicular or axillary adenopathy.      Left upper body: No supraclavicular or axillary adenopathy.      Lower Body: No right inguinal adenopathy. No left inguinal adenopathy.   Skin:     General: Skin is warm and dry.   Neurological:      Mental Status: She is alert and oriented to person, place, and time.   Psychiatric:         Behavior: Behavior normal. Behavior is cooperative.         Thought Content: Thought content normal.         Judgment: Judgment normal.

## 2025-02-24 RX ORDER — ONDANSETRON 4 MG/1
4 TABLET, FILM COATED ORAL EVERY 12 HOURS PRN
COMMUNITY
Start: 2025-02-21 | End: 2025-02-25 | Stop reason: SDUPTHER

## 2025-02-25 ENCOUNTER — OFFICE VISIT (OUTPATIENT)
Dept: FAMILY MEDICINE CLINIC | Facility: CLINIC | Age: 50
End: 2025-02-25
Payer: COMMERCIAL

## 2025-02-25 VITALS
DIASTOLIC BLOOD PRESSURE: 80 MMHG | OXYGEN SATURATION: 98 % | HEIGHT: 62 IN | WEIGHT: 176 LBS | SYSTOLIC BLOOD PRESSURE: 124 MMHG | BODY MASS INDEX: 32.39 KG/M2 | HEART RATE: 102 BPM | RESPIRATION RATE: 17 BRPM | TEMPERATURE: 98.4 F

## 2025-02-25 DIAGNOSIS — J45.20 MILD INTERMITTENT ASTHMA, UNSPECIFIED WHETHER COMPLICATED: ICD-10-CM

## 2025-02-25 DIAGNOSIS — F33.0 MILD EPISODE OF RECURRENT MAJOR DEPRESSIVE DISORDER (HCC): ICD-10-CM

## 2025-02-25 DIAGNOSIS — E07.89 THYROID FULLNESS: Primary | ICD-10-CM

## 2025-02-25 DIAGNOSIS — M54.12 CERVICAL RADICULOPATHY: ICD-10-CM

## 2025-02-25 DIAGNOSIS — M54.81 BILATERAL OCCIPITAL NEURALGIA: ICD-10-CM

## 2025-02-25 DIAGNOSIS — R11.2 NAUSEA AND VOMITING, UNSPECIFIED VOMITING TYPE: ICD-10-CM

## 2025-02-25 PROCEDURE — 99214 OFFICE O/P EST MOD 30 MIN: CPT | Performed by: NURSE PRACTITIONER

## 2025-02-25 RX ORDER — ONDANSETRON 4 MG/1
4 TABLET, FILM COATED ORAL EVERY 8 HOURS PRN
Qty: 30 TABLET | Refills: 3 | Status: SHIPPED | OUTPATIENT
Start: 2025-02-25

## 2025-02-25 NOTE — PROGRESS NOTES
Name: Kamran Rees      : 1975      MRN: [id number]  Encounter Provider: DIANA Dodd  Encounter Date: 2025   Encounter department: [facility]  :  Assessment & Plan  Nausea and vomiting, unspecified vomiting type    Orders:    ondansetron (ZOFRAN) 4 mg tablet; Take 1 tablet (4 mg total) by mouth every 8 (eight) hours as needed for nausea or vomiting    Mild intermittent asthma, unspecified whether complicated  Controlled       Cervical radiculopathy  Managed by multispecialty team  Will follow along       Bilateral occipital neuralgia         Thyroid fullness  Biopsy   Result pending  We will call       Mild episode of recurrent major depressive disorder (HCC)  Due to ongoing health problems  Continue current treatment plan  Depression Screening Follow-up Plan: Patient's depression screening was positive with a PHQ-2 score of 3. Their PHQ-9 score was 9. Patient assessed for underlying major depression. They have no active suicidal ideations. Brief counseling provided and recommend additional follow-up/re-evaluation next office visit.         Patient clinically stable at this time.  Cont with current plan of care.   RTO as recommended and PRN      Depression Screening and Follow-up Plan: Patient's depression screening was positive with a PHQ-2 score of 3. Their PHQ-9 score was 9.   Patient assessed for underlying major depression. Brief counseling provided and recommend additional follow-up/re-evaluation next office visit.       History of Present Illness   Kamran is a very pleasant 49-year-old who follows up to discuss recent thyroid biopsy  She is accompanied by a friend  Preliminary shows benign follicular cells.  Formal result is not yet available.  Advised her of the same    We also discussed recent updates to her chronic conditions    She has cervical radiculopathies, chronic headaches, neuralgias  She is recovering from carpal tunnel release and left  "hand  She is scheduled for cervical Rhizotomy next month  She is considering spinal cord stimulator trial    From the same syndromes that she has ongoing positional dizziness which causes nausea-she is requesting refill of Zofran    She is on long-term disability for ongoing symptomology    Reviewed medical history in detail. Changes to medical record changed where appropriate. Reviewed medications. Patient taking as prescribed. Tolerating well. Denies Side effects. Reviewed recent visits with specialists co-managing chronic conditions.         Review of Systems   Constitutional:  Positive for fatigue. Negative for fever.   HENT:  Negative for trouble swallowing.    Respiratory: Negative.     Cardiovascular: Negative.    Gastrointestinal: Negative.    Endocrine: Negative.    Genitourinary:  Negative for difficulty urinating and dysuria.        S/p partial hysterectomy   Musculoskeletal:  Positive for arthralgias, back pain and neck pain.   Skin: Negative.    Neurological:  Positive for numbness and headaches. Negative for weakness.   Hematological:  Does not bruise/bleed easily.   Psychiatric/Behavioral:  Positive for sleep disturbance. The patient is nervous/anxious.        Objective   /80 (BP Location: Left arm, Patient Position: Sitting, Cuff Size: Large)   Pulse 102   Temp 98.4 °F (36.9 °C) (Temporal)   Resp 17   Ht 5' 2\" (1.575 m)   Wt 79.8 kg (176 lb)   SpO2 98%   BMI 32.19 kg/m²      Physical Exam  Vitals and nursing note reviewed.   Constitutional:       General: She is not in acute distress.     Appearance: Normal appearance.      Comments: Appears uncomfortable   Musculoskeletal:      Comments: Bandage on left hand   Skin:     General: Skin is warm and dry.   Neurological:      General: No focal deficit present.      Mental Status: She is alert. Mental status is at baseline.   Psychiatric:         Mood and Affect: Mood normal. Affect is tearful.         Behavior: Behavior normal. "       Administrative Statements   I have spent a total time of 30 minutes in caring for this patient on the day of the visit/encounter including Diagnostic results, Prognosis, Risks and benefits of tx options, Instructions for management, Patient and family education, Importance of tx compliance, Risk factor reductions, Impressions, Counseling / Coordination of care, Documenting in the medical record, Reviewing/placing orders in the medical record (including tests, medications, and/or procedures), and Obtaining or reviewing history  .

## 2025-03-11 ENCOUNTER — TELEPHONE (OUTPATIENT)
Age: 50
End: 2025-03-11

## 2025-03-11 NOTE — TELEPHONE ENCOUNTER
Patient called with questions about having abdominoplasty. She had sleeve gastrectomy three years ago. Does she need to see Malou or Dr. Zavala? Made a warm transfer of the call to Rachel in the [place] office as patient would need to see Malou for a referral to plastic surgery.

## 2025-03-17 NOTE — ASSESSMENT & PLAN NOTE
-At risk for malabsorption of vitamins/minerals secondary to malabsorption and restriction of intake from bariatric surgery  -Currently taking adequate postop bariatric surgery vitamin supplementation: Rolo MVI with 45mg iron, calcium citrate 500mg TID    -Labs from December - many deficiencies and high Vitamin A- she will repeat B12, Vitamin D, iron A again now    -Obtain CBC/Metabolic panel  -Patient received education about the importance of adhering to a lifelong supplementation regimen to avoid vitamin/mineral deficiencies

## 2025-03-17 NOTE — ASSESSMENT & PLAN NOTE
-s/p Vertical Sleeve Gastrectomy with Dr. Engel on 08/04/20.  Still regaining - up another 10lbs in the last 4 months - wishes to lose about 15lbs. She will work on diet changes - avoid refined CHO and late night snacking and increase protein and fiber and fluids. F/u with RD asap and me in 6 months.     Up to date with EGD - Needs repeat EGD in 3 years.     Initial: 236lbs  Current: 181lbs  EWL: 50%  Dutch: 152lbs  Current BMI is Body mass index is 33.11 kg/m².     Tolerating a regular diet-yes  Eating at least 60 grams of protein per day-yes  Following 30/60 minute rule with liquids-yes  Drinking at least 64 ounces of fluid per day-no  Drinking carbonated beverages-no  Sufficient exercise-no  Using NSAIDs regularly-no  Using nicotine-no  Using alcohol-weekly 1 small drink. Advised about the risks of alcohol s/p bariatric surgery and recommend avoiding all alcohol

## 2025-03-19 ENCOUNTER — OFFICE VISIT (OUTPATIENT)
Dept: BARIATRICS | Facility: CLINIC | Age: 50
End: 2025-03-19
Payer: COMMERCIAL

## 2025-03-19 VITALS
SYSTOLIC BLOOD PRESSURE: 100 MMHG | BODY MASS INDEX: 33.31 KG/M2 | OXYGEN SATURATION: 100 % | HEIGHT: 62 IN | DIASTOLIC BLOOD PRESSURE: 70 MMHG | WEIGHT: 181 LBS | HEART RATE: 84 BPM

## 2025-03-19 DIAGNOSIS — Z48.815 ENCOUNTER FOR SURGICAL AFTERCARE FOLLOWING SURGERY OF DIGESTIVE SYSTEM: Primary | ICD-10-CM

## 2025-03-19 DIAGNOSIS — K91.2 POSTSURGICAL MALABSORPTION: ICD-10-CM

## 2025-03-19 DIAGNOSIS — L98.7 EXCESS SKIN: ICD-10-CM

## 2025-03-19 DIAGNOSIS — I10 PRIMARY HYPERTENSION: ICD-10-CM

## 2025-03-19 DIAGNOSIS — B37.2 INTERTRIGINOUS CANDIDIASIS: ICD-10-CM

## 2025-03-19 PROCEDURE — 99214 OFFICE O/P EST MOD 30 MIN: CPT | Performed by: PHYSICIAN ASSISTANT

## 2025-03-19 RX ORDER — HALOBETASOL PROPIONATE 0.5 MG/G
CREAM TOPICAL
COMMUNITY
Start: 2025-02-20
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
## 2025-03-28 ENCOUNTER — TELEPHONE (OUTPATIENT)
Age: 50
End: 2025-03-28

## 2025-03-28 NOTE — TELEPHONE ENCOUNTER
Since in house biopsy was benign and Benign (Dexter Category II)   It did not meet criteria for reflex to send out.   Only Category III and malignant results are sent out.

## 2025-04-15 ENCOUNTER — CLINICAL SUPPORT (OUTPATIENT)
Dept: BARIATRICS | Facility: CLINIC | Age: 50
End: 2025-04-15

## 2025-04-15 VITALS — WEIGHT: 177.6 LBS | HEIGHT: 62 IN | BODY MASS INDEX: 32.68 KG/M2

## 2025-04-15 DIAGNOSIS — K91.2 POSTSURGICAL MALABSORPTION: Primary | ICD-10-CM

## 2025-04-15 PROCEDURE — RECHECK

## 2025-04-15 NOTE — PROGRESS NOTES
"Bariatric Follow Up Nutrition Note    Type of surgery  Vertical sleeve gastrectomy  Surgery Date: 8/4/2020  5 years  post-op  Surgeon: Dr. Gerson Engel     Nutrition Assessment   Kamran Rees  49 y.o.  female  Height 5' 2\" (1.575 m), weight 80.6 kg (177 lb 9.6 oz).    Initial:  225.4#  Highest:  236#  Weight on Day of Weight Loss Surgery: 218.6#  Weight in (lb) to have BMI = 25: 135.8#  Pre-Op Excess Wt: 90#  Dutch:  145-150 but felt was too thin, likes between 160-170# (out of work since August)  Post-Op Wt Loss: 47.8#/ 53% EBWL in 5 year(s)    Review of History and Medications   Past Medical History:   Diagnosis Date    Acid reflux     Asthma     Baker cyst, left     Constipation     Cough     CPAP (continuous positive airway pressure) dependence     Depression     Digestive disorder     Fatigue     Headache     Headache(784.0)     History of mammogram 07/17/2018    Hypertension 9/25/2023    Since head pains started    Increased urinary frequency     Joint pain     Migraine     Obesity (BMI 30-39.9)     Postgastrectomy malabsorption     Seasonal allergies     Shortness of breath     Sleep apnea     Sleep difficulties     Vertigo      Past Surgical History:   Procedure Laterality Date    CARPAL TUNNEL RELEASE      EGD      ENDOMETRIAL ABLATION W/ NOVASURE  2014    Laparscopy, D&C, Novasure ablation    EPIDURAL BLOCK INJECTION N/A 02/02/2024    Procedure: C7-T1 CERVICAL EPIDURAL STEROID INJECTION;  Surgeon: Ronni Landry MD;  Location: [facility] MAIN OR;  Service: Pain Management     FOOT SURGERY      HYSTERECTOMY  12/20/2016    B/L salpingectomy    KNEE SURGERY      KNEE SURGERY      05/2024    NERVE BLOCK N/A 04/12/2024    Procedure: TON C3 C4 MEDIAL BRANCH BLOCK #1;  Surgeon: Ronni Landry MD;  Location: [facility] MAIN OR;  Service: Pain Management     NERVE BLOCK N/A 04/26/2024    Procedure: TON C3 C4 MEDIAL BRANCH BLCOK;  Surgeon: Ronni Landry MD;  Location: [facility] MAIN OR;  Service: Pain Management     " PARTIAL HYSTERECTOMY      CT LAPS GSTRC RSTRICTIV PX LONGITUDINAL GASTRECTOMY N/A 2020    Procedure: LAPAROSCOPIC SLEEVE  GASTRECTOMY;  Surgeon: Gerson Engel MD;  Location: [facility] MAIN OR;  Service: Bariatrics    RADIOFREQUENCY ABLATION Right 2024    Procedure: RIGHT TON C3 C4 RADIO FREQUENCY ABLATION;  Surgeon: Ronni Landry MD;  Location: [facility] MAIN OR;  Service: Pain Management     RADIOFREQUENCY ABLATION Left 2024    Procedure: LEFT TON C3 C4 RADIO FREQUENCY ABLATION;  Surgeon: Ronni Landry MD;  Location: [facility] MAIN OR;  Service: Pain Management     TENDON REPAIR  2016    TUBAL LIGATION  2010    Laparoscopy tubal cautery    US GUIDED THYROID BIOPSY  2025     Social History     Socioeconomic History    Marital status: Single     Spouse name: Not on file    Number of children: Not on file    Years of education: 12    Highest education level: Not on file   Occupational History    Occupation: Unemployed    Tobacco Use    Smoking status: Never    Smokeless tobacco: Never   Vaping Use    Vaping status: Never Used   Substance and Sexual Activity    Alcohol use: Yes     Alcohol/week: 2.0 standard drinks of alcohol     Types: 2 Glasses of wine per week     Comment: Occasionally    Drug use: No    Sexual activity: Yes     Partners: Male     Birth control/protection: Female Sterilization   Other Topics Concern    Not on file   Social History Narrative    · Most recent tobacco use screenin2019      · Do you currently or have you served in the  Armed Forces:   No      · Were you activated, into active duty, as a member of the [employer] or as a Reservist:   No      · Sexual orientation:   Heterosexual      · Exercise level:   None      · Diet:   Regular      · General stress level:   Low      · Caffeine intake:   Moderate      · Guns present in home:   No      · Seat belts used routinely:   Yes      · Sunscreen used routinely:   Yes      · Smoke alarm in home:   Yes      · Advance directive:   Yes      · Live alone or with others:   with others      · Are there stairs in your home:   Yes      · International travel:   none     As per Brigitte      Social Drivers of Health     Financial Resource Strain: Not on file   Food Insecurity: Not on file   Transportation Needs: Not on file   Physical Activity: Not on file   Stress: Not on file   Social Connections: Not on file   Intimate Partner Violence: Not on file   Housing Stability: Not on file       Current Outpatient Medications:     albuterol (PROVENTIL HFA,VENTOLIN HFA) 90 mcg/act inhaler, Inhale 2 puffs 4 (four) times a day (Patient not taking: Reported on 3/19/2025), Disp: 18 g, Rfl: 3    azelastine (ASTELIN) 0.1 % nasal spray, 1 spray into each nostril 2 (two) times a day Use in each nostril as directed, Disp: 1 mL, Rfl: 2    budesonide-formoterol (SYMBICORT) 80-4.5 MCG/ACT inhaler, Inhale 2 puffs 2 (two) times a day Rinse mouth after use. (Patient not taking: Reported on 3/19/2025), Disp: 10.2 g, Rfl: 3    Calcium-Vitamin D-Vitamin K 650-12.5-40 MG-MCG-MCG CHEW, Chew 1 tablet, Disp: , Rfl:     clonazePAM (KlonoPIN) 0.5 mg tablet, Take 0.5 mg by mouth as needed, Disp: , Rfl:     cyclobenzaprine (FLEXERIL) 5 mg tablet, Take 5-10 mg by mouth daily at bedtime as needed, Disp: , Rfl:     ergocalciferol (VITAMIN D2) 50,000 units, Take 1 capsule (50,000 Units total) by mouth 2 (two) times a week with meals (Patient not taking: Reported on 3/19/2025), Disp: 20 capsule, Rfl: 0    fluticasone (FLONASE) 50 mcg/act nasal spray, 1 spray into each nostril 2 (two) times a day, Disp: 1 g, Rfl: 6    halobetasol (ULTRAVATE) 0.05 % cream, APPLY THIN LAYER TOPICALLY TO THE AFFECTED AREA EVERY 12 HOURS, Disp: , Rfl:     HYDROcodone-acetaminophen (NORCO) 5-325 mg per tablet, Every 6 hours (Patient not taking: Reported on 3/19/2025), Disp: , Rfl:     ipratropium-albuterol (DUO-NEB) 0.5-2.5 mg/3 mL nebulizer solution, , Disp: , Rfl:      "losartan-hydrochlorothiazide (HYZAAR) 50-12.5 mg per tablet, Take 1 tablet by mouth daily, Disp: 90 tablet, Rfl: 1    Multiple Vitamin (multivitamin) tablet, Take 1 tablet by mouth daily Bariatric, Disp: , Rfl:     ondansetron (ZOFRAN) 4 mg tablet, Take 1 tablet (4 mg total) by mouth every 8 (eight) hours as needed for nausea or vomiting, Disp: 30 tablet, Rfl: 3    promethazine-dextromethorphan (PHENERGAN-DM) 6.25-15 mg/5 mL oral syrup, Take 5 mL by mouth 4 (four) times a day as needed for cough (Patient not taking: Reported on 3/19/2025), Disp: 180 mL, Rfl: 0    triamcinolone (KENALOG) 0.025 % cream, Apply topically 2 (two) times a day (Patient not taking: Reported on 3/19/2025), Disp: 80 g, Rfl: 0    Food Intake and Lifestyle Assessment   Food Intake Assessment completed via usual diet recall  Breakfast: 9-10am:  1 egg + 1 toast  Lunch: 1-2pm:  granola bar (NV peanut flavored) or greek yogurt parift with berries and granola (high pro granola)  Snack: -  Dinner: 5-6pm: 1.5-2oz chicken, potato and veggie   Snack: 10-11pm: when  gets home from work --cookie, ice cream, etc.     Beverage intake: water, sugar free beverages, and alcohol (2x/week--vodka + zero sugar fruit punch 2 drinks each night)  Diet texture/stage: regular  Protein supplement: none--\"can't stomach them\"  Estimated protein intake per day: <60gm  Estimated fluid intake per day: 64oz water/sf flavoring + 4 vodka + sf drink per day  Meals eaten away from home: mostly may go out for breakfast at times  Typical meal pattern: 2-3 meals per day and snacks at night  Eating Behaviors: Appropriate portion sizes, Consumption of high calorie/ high fat foods, Frequent snacking/ grazing, Mindless eating, and Craves sweet foods    Food allergies or intolerances: tomato sauce = heartburn  Cultural or Christian considerations: -    Vitamins:  Barislim chewable one a day with 45mg iron  Vitamin D rx which is finished  Vitamin D + calcium tablet 1 per day  Is " "taking B12 (? Dose)  Will get blood work completed    Physical Assessment  Nutrition Related Findings  Constipation:  uses colace PRN    Physical Activity  Types of exercise: no longer working, gets dizzy and falls often, walks the do 2 blocks per day  Current physical limitations: \"headaches\" that cause dizziness    Psychosocial Assessment   Support systems: spouse friend(s) relative(s)  Socioeconomic factors: -    Nutrition Diagnosis  Diagnosis: Inadequate protein intake (NI-5.7.3)  Related to: Food and nutrition-related knowledge deficit  As Evidenced by: Expected anthropometric outcomes are not achieved     Interventions and Teaching   Patient educated on post-op nutrition guidelines.       Patient educated and handouts provided.  Capacity of post-surgery stomach  Adequate hydration  Expected weight loss  Weight loss plateaus/ possibility of weight regain  Exercise  Nutrition considerations after surgery  Protein supplements  Meal planning and preparation  Appropriate carbohydrate, protein, and fat intake, and food/fluid choices to maximize safe weight loss, nutrient intake, and tolerance   Dietary and lifestyle changes  Possible problems with poor eating habits  Intuitive eating  Techniques for self monitoring and keeping daily food journal  Potential for food intolerance after surgery, and ways to deal with them including: lactose intolerance, nausea, reflux, vomiting, diarrhea, food intolerance, appetite changes, gas  Vitamin / Mineral supplementation of Multivitamin with minerals, Calcium, Vitamin B12, Iron, and Vitamin D    Education provided to: patient    Barriers to learning: No barriers identified    Readiness to change: action    Comprehension: verbalizes understanding     Expected Compliance: good    Pt presents today 5 years s/p sleeve gastrectomy with some weight regain.  Reviewed diet recall and noted portions still appear restrictive, but appears likely not meeting protein needs.  Pt does not like " the protein shakes therefore discussed other options to best meet protein needs.  Encouraged 3 meals per day with protein at each meal, suggested to try the Oikos yogurt drink w/23gm protein and/or protein bar as an evening snack since pt does eat dinner early.      Goals  Food journal via baritastic  Exercise 30 minutes 5 times per week--walk or resistance bands as tolerated  Eat 3 meals per day with protein at each meal  Eliminate mindless snacking  Try Oikos yogurt drink and protein bars for added protein     Time Spent:   30 Minutes

## 2025-04-17 ENCOUNTER — APPOINTMENT (OUTPATIENT)
Dept: LAB | Facility: HOSPITAL | Age: 50
End: 2025-04-17
Attending: PHYSICIAN ASSISTANT
Payer: COMMERCIAL

## 2025-04-17 DIAGNOSIS — R79.89 LOW VITAMIN B12 LEVEL: ICD-10-CM

## 2025-04-17 DIAGNOSIS — K91.2 POSTSURGICAL MALABSORPTION: ICD-10-CM

## 2025-04-17 DIAGNOSIS — L98.7 EXCESS SKIN: ICD-10-CM

## 2025-04-17 DIAGNOSIS — E55.9 VITAMIN D INSUFFICIENCY: ICD-10-CM

## 2025-04-17 DIAGNOSIS — E67.0 HIGH VITAMIN A LEVEL: ICD-10-CM

## 2025-04-17 DIAGNOSIS — B37.2 INTERTRIGINOUS CANDIDIASIS: ICD-10-CM

## 2025-04-17 DIAGNOSIS — E55.9 AVITAMINOSIS D: ICD-10-CM

## 2025-04-17 LAB
25(OH)D3 SERPL-MCNC: 32.3 NG/ML (ref 30–100)
VIT B12 SERPL-MCNC: 230 PG/ML (ref 180–914)

## 2025-04-17 PROCEDURE — 82306 VITAMIN D 25 HYDROXY: CPT

## 2025-04-17 PROCEDURE — 36415 COLL VENOUS BLD VENIPUNCTURE: CPT

## 2025-04-17 PROCEDURE — 84590 ASSAY OF VITAMIN A: CPT

## 2025-04-17 PROCEDURE — 82607 VITAMIN B-12: CPT

## 2025-04-21 ENCOUNTER — RESULTS FOLLOW-UP (OUTPATIENT)
Dept: OTHER | Facility: HOSPITAL | Age: 50
End: 2025-04-21

## 2025-04-21 DIAGNOSIS — K91.2 POSTSURGICAL MALABSORPTION: Primary | ICD-10-CM

## 2025-04-21 DIAGNOSIS — R79.89 LOW VITAMIN B12 LEVEL: ICD-10-CM

## 2025-04-21 LAB — VIT A SERPL-MCNC: 61.9 UG/DL (ref 20.1–62)

## 2025-04-21 RX ORDER — CYANOCOBALAMIN 1000 UG/ML
1000 INJECTION, SOLUTION INTRAMUSCULAR; SUBCUTANEOUS WEEKLY
Status: SHIPPED | OUTPATIENT
Start: 2025-04-21 | End: 2025-05-19

## 2025-04-21 NOTE — RESULT ENCOUNTER NOTE
Please let Kamran know that her Vitamin A has normalized.     -Her B12 is still low - how much is she taking. -Vitamin B12 is low - Please take an additional 2,000mcg sublingual B12 daily x 3 months. This can be found inexpensively OTC. I also recommend 1,000mcg IM B12 shots in the office; once weekly x 4 doses.    -Your vitamin D is low normal:  Please add an additional 2,000 IU of Vitamin D3 per day in addition to the 3,000IU of Vitamin D you are currently taking in your Bariatric MVI.  Vitamin D is best absorbed with food, so take it with your largest meal of the day. It can be found inexpensively over the counter at your pharmacy or online.    Remember to also take 1500 mg calcium citrate per day total (taken 500 mg at a time, three times per day). It is very important that you separate each dose by at least 2 hours and take calcium at least 2 hours apart from MVI and iron.

## 2025-04-22 ENCOUNTER — TELEPHONE (OUTPATIENT)
Dept: PLASTIC SURGERY | Facility: CLINIC | Age: 50
End: 2025-04-22

## 2025-04-22 NOTE — TELEPHONE ENCOUNTER
Pt received release to exercise per Sheryle Mail, RRT. Medications and goals reviewed. Pt did have a change in medications but forgot his list.  He will bring the new medications his next visit. Pt states he us using PLB/DB regularly and has noticed some improvement in his strength and endurance. Pt did not perform weight exercises due to left arm issues. He states he has \"a pinched nerve\". Other exercises tolerated well with no problems or c/o noted. We will increase exercise time and intensity as tolerated. Reached out to patient via email as I was covering another position for the day.  Let her know of the insurance criteria that is looked at for a panniculectomy surgery and asked her to call at her convenience if she would like to discuss.  No criteria listed in chart.

## 2025-04-27 DIAGNOSIS — F41.9 ANXIETY: Primary | ICD-10-CM

## 2025-04-28 RX ORDER — CLONAZEPAM 0.5 MG/1
0.5 TABLET ORAL DAILY PRN
Qty: 30 TABLET | Refills: 1 | Status: SHIPPED | OUTPATIENT
Start: 2025-04-28

## 2025-06-05 ENCOUNTER — APPOINTMENT (OUTPATIENT)
Dept: LAB | Facility: CLINIC | Age: 50
End: 2025-06-05
Attending: INTERNAL MEDICINE
Payer: COMMERCIAL

## 2025-06-05 ENCOUNTER — HOSPITAL ENCOUNTER (OUTPATIENT)
Dept: RADIOLOGY | Facility: HOSPITAL | Age: 50
Discharge: HOME | End: 2025-06-05
Payer: COMMERCIAL

## 2025-06-05 DIAGNOSIS — Z01.818 OTHER SPECIFIED PRE-OPERATIVE EXAMINATION: ICD-10-CM

## 2025-06-05 DIAGNOSIS — Z01.818 ENCOUNTER FOR OTHER PREPROCEDURAL EXAMINATION: ICD-10-CM

## 2025-06-05 LAB
ANION GAP SERPL CALCULATED.3IONS-SCNC: 7 MMOL/L (ref 4–13)
APTT PPP: 28 SECONDS (ref 23–34)
BASOPHILS # BLD AUTO: 0.06 THOUSANDS/ÂΜL (ref 0–0.1)
BASOPHILS NFR BLD AUTO: 1 % (ref 0–1)
BILIRUB UR QL STRIP: NEGATIVE
BUN SERPL-MCNC: 14 MG/DL (ref 5–25)
CALCIUM SERPL-MCNC: 9 MG/DL (ref 8.4–10.2)
CHLORIDE SERPL-SCNC: 101 MMOL/L (ref 96–108)
CLARITY UR: CLEAR
CO2 SERPL-SCNC: 30 MMOL/L (ref 21–32)
COLOR UR: NORMAL
CREAT SERPL-MCNC: 0.79 MG/DL (ref 0.6–1.3)
EOSINOPHIL # BLD AUTO: 0.04 THOUSAND/ÂΜL (ref 0–0.61)
EOSINOPHIL NFR BLD AUTO: 1 % (ref 0–6)
ERYTHROCYTE [DISTWIDTH] IN BLOOD BY AUTOMATED COUNT: 12.1 % (ref 11.6–15.1)
GFR SERPL CREATININE-BSD FRML MDRD: 89 ML/MIN/1.73SQ M
GLUCOSE P FAST SERPL-MCNC: 85 MG/DL (ref 65–99)
GLUCOSE UR STRIP-MCNC: NEGATIVE MG/DL
HCT VFR BLD AUTO: 36.6 % (ref 34.8–46.1)
HGB BLD-MCNC: 12.5 G/DL (ref 11.5–15.4)
HGB UR QL STRIP.AUTO: NEGATIVE
IMM GRANULOCYTES # BLD AUTO: 0.02 THOUSAND/UL (ref 0–0.2)
IMM GRANULOCYTES NFR BLD AUTO: 0 % (ref 0–2)
INR PPP: 0.88 (ref 0.85–1.19)
KETONES UR STRIP-MCNC: NEGATIVE MG/DL
LEUKOCYTE ESTERASE UR QL STRIP: NEGATIVE
LYMPHOCYTES # BLD AUTO: 2.94 THOUSANDS/ÂΜL (ref 0.6–4.47)
LYMPHOCYTES NFR BLD AUTO: 34 % (ref 14–44)
MCH RBC QN AUTO: 32.5 PG (ref 26.8–34.3)
MCHC RBC AUTO-ENTMCNC: 34.2 G/DL (ref 31.4–37.4)
MCV RBC AUTO: 95 FL (ref 82–98)
MONOCYTES # BLD AUTO: 0.43 THOUSAND/ÂΜL (ref 0.17–1.22)
MONOCYTES NFR BLD AUTO: 5 % (ref 4–12)
NEUTROPHILS # BLD AUTO: 5.23 THOUSANDS/ÂΜL (ref 1.85–7.62)
NEUTS SEG NFR BLD AUTO: 59 % (ref 43–75)
NITRITE UR QL STRIP: NEGATIVE
NRBC BLD AUTO-RTO: 0 /100 WBCS
PH UR STRIP.AUTO: 7.5 [PH]
PLATELET # BLD AUTO: 213 THOUSANDS/UL (ref 149–390)
PMV BLD AUTO: 10.6 FL (ref 8.9–12.7)
POTASSIUM SERPL-SCNC: 3.4 MMOL/L (ref 3.5–5.3)
PROT UR STRIP-MCNC: NEGATIVE MG/DL
PROTHROMBIN TIME: 12.7 SECONDS (ref 12.3–15)
RBC # BLD AUTO: 3.85 MILLION/UL (ref 3.81–5.12)
SODIUM SERPL-SCNC: 138 MMOL/L (ref 135–147)
SP GR UR STRIP.AUTO: 1.01 (ref 1–1.03)
UROBILINOGEN UR STRIP-ACNC: <2 MG/DL
WBC # BLD AUTO: 8.72 THOUSAND/UL (ref 4.31–10.16)

## 2025-06-05 PROCEDURE — 85610 PROTHROMBIN TIME: CPT

## 2025-06-05 PROCEDURE — 36415 COLL VENOUS BLD VENIPUNCTURE: CPT

## 2025-06-05 PROCEDURE — 71046 X-RAY EXAM CHEST 2 VIEWS: CPT

## 2025-06-05 PROCEDURE — 85025 COMPLETE CBC W/AUTO DIFF WBC: CPT

## 2025-06-05 PROCEDURE — 80048 BASIC METABOLIC PNL TOTAL CA: CPT

## 2025-06-05 PROCEDURE — 85730 THROMBOPLASTIN TIME PARTIAL: CPT

## 2025-06-05 PROCEDURE — 81003 URINALYSIS AUTO W/O SCOPE: CPT

## 2025-06-10 ENCOUNTER — CONSULT (OUTPATIENT)
Dept: FAMILY MEDICINE CLINIC | Facility: CLINIC | Age: 50
End: 2025-06-10
Payer: COMMERCIAL

## 2025-06-10 VITALS
OXYGEN SATURATION: 98 % | SYSTOLIC BLOOD PRESSURE: 118 MMHG | BODY MASS INDEX: 33.23 KG/M2 | WEIGHT: 180.6 LBS | HEART RATE: 105 BPM | HEIGHT: 62 IN | DIASTOLIC BLOOD PRESSURE: 76 MMHG | TEMPERATURE: 98.5 F

## 2025-06-10 DIAGNOSIS — Z13.6 SCREENING FOR CARDIOVASCULAR CONDITION: ICD-10-CM

## 2025-06-10 DIAGNOSIS — Z01.818 PRE-OP EXAM: Primary | ICD-10-CM

## 2025-06-10 DIAGNOSIS — E87.6 HYPOKALEMIA: ICD-10-CM

## 2025-06-10 PROCEDURE — 99214 OFFICE O/P EST MOD 30 MIN: CPT | Performed by: NURSE PRACTITIONER

## 2025-06-10 RX ORDER — POTASSIUM CHLORIDE 750 MG/1
10 TABLET, EXTENDED RELEASE ORAL DAILY
Qty: 10 TABLET | Refills: 0 | Status: SHIPPED | OUTPATIENT
Start: 2025-06-10 | End: 2025-06-17

## 2025-06-10 NOTE — PROGRESS NOTES
Pre-operative Clearance  Name: Kamran Rees      : 1975      MRN: [id number]  Encounter Provider: DIANA Dodd  Encounter Date: 6/10/2025   Encounter department: [facility]    :  Assessment & Plan  Pre-op exam    Orders:  •  ECG 12 lead; Future    Screening for cardiovascular condition    Orders:  •  ECG 12 lead; Future    Hypokalemia    Orders:  •  potassium chloride (Klor-Con M10) 10 mEq tablet; Take 1 tablet (10 mEq total) by mouth daily for 10 days          Pre-operative Clearance:     Revised Cardiac Risk Index:  RCI RISK CLASS I (0 risk factors, risk of major cardiac complications approximately 0.5%)    Clearance:       Patient medically cleared at standard risk for elective surgery-occipital nerve stimulator trial to be performed under general anesthesia-pending normal EKG. amended clearance note to follow.      Medication Instructions:   - ACE Inhibitors or ARBs: Continue this medication up to the evening before surgery/procedure, but do not take the morning of the day of surgery.  - 5HT3 Antagonist (ie, zofran):  Continue to take this medication on your normal schedule.   - Antiepileptic meds: Continue to take this medication on your normal schedule.  - Benzodiazepines (ie, alprazolam, lorazepam, diazepam):  If the medication is needed, continue to take it on your normal schedule.  - Buprenorphine: Continue to take this medication on your normal schedule.  - Opioids: Continue to take this medication on your normal schedule.       History of Present Illness     Pre-Op Examination     Surgery: Occipital nerve stimulator trial   Anticipated Date of Surgery: July 3, 2025   Surgeon: Dr. Manoj Palmer     49-year-old female known to me with ongoing complicated pain syndrome presents for preop clearance for insertion occipital nerve stimulator trial under general anesthesia    We reviewed previous surgeries performed both under MAC sedation and general anesthesia  She has  tolerated anesthesia well in both circumstances-denies perioperative or post operative complications    I reviewed preadmission testing blood work-overall acceptable-her potassium level slightly low.  I will optimize this  I will have her complete her EKG and outpatient testing once she replaces potassium    She is in her usual state of health other than her pain syndrome  She has no chest pain, dyspnea, dizziness, weakness, bleeding or clotting issues     Previous history of bleeding disorders or clots?: No    Previous Anesthesia reaction?: No    Prolonged steroid use in the last 6 months?: No      Assessment of Cardiac Risk:   - Unstable or severe angina or MI in the last 6 weeks or history of stent placement in the last year?: No    - Decompensated heart failure (e.g. New onset heart failure, NYHA  Class IV heart failure, or worsening existing heart failure)?: No    - Significant arrhythmias such as high grade AV block, symptomatic ventricular arrhythmia, newly recognized ventricular tachycardia, supraventricular tachycardia with resting heart rate >100, or symptomatic bradycardia?: No    - Severe heart valve disease including aortic stenosis or symptomatic mitral stenosis?: No      Pre-operative Risk Factors:  - Elevated-risk surgery: No    - History of cerebrovascular disease: No    - History of ischemic heart disease: No    - History of congestive heart failure: No    - Pre-operative treatment with insulin: No    - Pre-operative creatinine >2 mg/dL: No      Duke Activity Status Index (DASI):    - DASI Total Score: 13.45   - METs: 4.4     Review of Systems   Constitutional:  Positive for fatigue. Negative for fever.   HENT:  Negative for trouble swallowing.    Respiratory: Negative.     Cardiovascular: Negative.    Gastrointestinal: Negative.    Endocrine: Negative.    Genitourinary:  Negative for difficulty urinating and dysuria.        S/p partial hysterectomy   Musculoskeletal:  Positive for arthralgias,  back pain and neck pain.   Skin: Negative.    Neurological:  Positive for numbness and headaches. Negative for weakness.   Hematological:  Does not bruise/bleed easily.   Psychiatric/Behavioral:  Positive for sleep disturbance. The patient is nervous/anxious.      Past Medical History   Past Medical History:   Diagnosis Date   • Acid reflux    • Asthma    • Baker cyst, left    • Constipation    • Cough    • CPAP (continuous positive airway pressure) dependence    • Depression    • Digestive disorder    • Fatigue    • Headache    • Headache(784.0)    • History of mammogram 07/17/2018   • Hypertension 9/25/2023    Since head pains started   • Increased urinary frequency    • Joint pain    • Migraine    • Obesity (BMI 30-39.9)    • Postgastrectomy malabsorption    • Seasonal allergies    • Shortness of breath    • Sleep apnea    • Sleep difficulties    • Vertigo      Past Surgical History:   Procedure Laterality Date   • CARPAL TUNNEL RELEASE     • EGD     • ENDOMETRIAL ABLATION W/ NOVASURE  2014    Laparscopy, D&C, Novasure ablation   • EPIDURAL BLOCK INJECTION N/A 02/02/2024    Procedure: C7-T1 CERVICAL EPIDURAL STEROID INJECTION;  Surgeon: Ronni Landry MD;  Location: [facility] MAIN OR;  Service: Pain Management    • FOOT SURGERY     • HYSTERECTOMY  12/20/2016    B/L salpingectomy   • KNEE SURGERY     • KNEE SURGERY      05/2024   • NERVE BLOCK N/A 04/12/2024    Procedure: TON C3 C4 MEDIAL BRANCH BLOCK #1;  Surgeon: Ronni Landry MD;  Location: [facility] MAIN OR;  Service: Pain Management    • NERVE BLOCK N/A 04/26/2024    Procedure: TON C3 C4 MEDIAL BRANCH BLCOK;  Surgeon: Ronni Landry MD;  Location: [facility] MAIN OR;  Service: Pain Management    • PARTIAL HYSTERECTOMY     • NJ LAPS GSTRC RSTRICTIV PX LONGITUDINAL GASTRECTOMY N/A 08/04/2020    Procedure: LAPAROSCOPIC SLEEVE  GASTRECTOMY;  Surgeon: Gerson Engel MD;  Location: [facility] MAIN OR;  Service: Bariatrics   • RADIOFREQUENCY ABLATION Right 05/16/2024     Procedure: RIGHT TON C3 C4 RADIO FREQUENCY ABLATION;  Surgeon: Ronni Landry MD;  Location: [facility] MAIN OR;  Service: Pain Management    • RADIOFREQUENCY ABLATION Left 05/31/2024    Procedure: LEFT TON C3 C4 RADIO FREQUENCY ABLATION;  Surgeon: Ronni Landry MD;  Location: [facility] MAIN OR;  Service: Pain Management    • TENDON REPAIR  2016   • TUBAL LIGATION  08/06/2010    Laparoscopy tubal cautery   • US GUIDED THYROID BIOPSY  2/13/2025     Family History   Problem Relation Name Age of Onset   • No Known Problems Mother     • Canavan disease Father Chaz jackson    • Cancer Father Chaz jackson    • Breast cancer Maternal Grandmother Emily tang    • Breast cancer Paternal Grandmother Lisa jackson      Social History     Tobacco Use   • Smoking status: Never   • Smokeless tobacco: Never   Vaping Use   • Vaping status: Never Used   Substance and Sexual Activity   • Alcohol use: Yes     Alcohol/week: 2.0 standard drinks of alcohol     Types: 2 Glasses of wine per week     Comment: Occasionally   • Drug use: No   • Sexual activity: Yes     Partners: Male     Birth control/protection: Female Sterilization     Current Outpatient Medications on File Prior to Visit   Medication Sig   • azelastine (ASTELIN) 0.1 % nasal spray 1 spray into each nostril 2 (two) times a day Use in each nostril as directed   • Belbuca 75 MCG FILM    • Calcium-Vitamin D-Vitamin K 650-12.5-40 MG-MCG-MCG CHEW Chew 1 tablet   • clonazePAM (KlonoPIN) 0.5 mg tablet Take 1 tablet (0.5 mg total) by mouth daily as needed for anxiety   • cyclobenzaprine (FLEXERIL) 5 mg tablet Take 5-10 mg by mouth daily at bedtime as needed   • fluticasone (FLONASE) 50 mcg/act nasal spray 1 spray into each nostril 2 (two) times a day   • halobetasol (ULTRAVATE) 0.05 % cream    • losartan-hydrochlorothiazide (HYZAAR) 50-12.5 mg per tablet Take 1 tablet by mouth daily   • Multiple Vitamin (multivitamin) tablet Take 1 tablet by mouth in the morning.  "Bariatric .   • ondansetron (ZOFRAN) 4 mg tablet Take 1 tablet (4 mg total) by mouth every 8 (eight) hours as needed for nausea or vomiting   • transdermal buprenorphine (BUTRANS) 10 mcg/hr TD patch APPLY 1 PATCH TOPICALLY TO THE SKIN EVERY 7 DAYS   • albuterol (PROVENTIL HFA,VENTOLIN HFA) 90 mcg/act inhaler Inhale 2 puffs 4 (four) times a day (Patient not taking: Reported on 3/19/2025)   • budesonide-formoterol (SYMBICORT) 80-4.5 MCG/ACT inhaler Inhale 2 puffs 2 (two) times a day Rinse mouth after use. (Patient not taking: Reported on 3/19/2025)   • ergocalciferol (VITAMIN D2) 50,000 units Take 1 capsule (50,000 Units total) by mouth 2 (two) times a week with meals (Patient not taking: Reported on 6/10/2025)   • HYDROcodone-acetaminophen (NORCO) 5-325 mg per tablet Every 6 hours (Patient not taking: Reported on 3/19/2025)   • ipratropium-albuterol (DUO-NEB) 0.5-2.5 mg/3 mL nebulizer solution  (Patient not taking: Reported on 3/19/2025)   • promethazine-dextromethorphan (PHENERGAN-DM) 6.25-15 mg/5 mL oral syrup Take 5 mL by mouth 4 (four) times a day as needed for cough (Patient not taking: Reported on 3/19/2025)   • triamcinolone (KENALOG) 0.025 % cream Apply topically 2 (two) times a day (Patient not taking: Reported on 6/10/2025)     Allergies   Allergen Reactions   • Meloxicam Nausea Only     Objective   /76 (BP Location: Left arm, Patient Position: Sitting, Cuff Size: Standard)   Pulse 105   Temp 98.5 °F (36.9 °C) (Temporal)   Ht 5' 2\" (1.575 m)   Wt 81.9 kg (180 lb 9.6 oz)   SpO2 98%   BMI 33.03 kg/m²     Physical Exam  Vitals and nursing note reviewed.   Constitutional:       General: She is not in acute distress.     Appearance: Normal appearance.   HENT:      Head: Normocephalic and atraumatic.      Mouth/Throat:      Mouth: Mucous membranes are moist.      Pharynx: Oropharynx is clear.     Eyes:      Pupils: Pupils are equal, round, and reactive to light.       Cardiovascular:      Rate and " Rhythm: Normal rate and regular rhythm.      Pulses: Normal pulses.      Heart sounds: Normal heart sounds.   Pulmonary:      Effort: Pulmonary effort is normal.      Breath sounds: Normal breath sounds.   Abdominal:      General: Bowel sounds are normal.      Palpations: Abdomen is soft.      Tenderness: There is no abdominal tenderness.     Musculoskeletal:      Cervical back: Tenderness present.      Right lower leg: No edema.      Left lower leg: No edema.   Lymphadenopathy:      Cervical: No cervical adenopathy.     Skin:     General: Skin is warm and dry.      Coloration: Skin is not pale.     Neurological:      General: No focal deficit present.      Mental Status: She is alert. Mental status is at baseline.      Motor: No weakness.      Gait: Gait normal.     Psychiatric:         Mood and Affect: Mood normal.         Behavior: Behavior normal.         DIANA Dodd

## 2025-06-10 NOTE — PATIENT INSTRUCTIONS
Pre-operative Medication Instructions    Avoid herbs or non-directed vitamins one week prior to surgery  Avoid aspirin containing medications or non-steroidal anti-inflammatory drugs one week preceding surgery  May take tylenol for pain up until the night before surgery    ACE Inhibitors or ARBs     Medication Name     losartan-hydrochlorothiazide (HYZAAR) 50-12.5 mg per tablet      Continue this medication up to the evening before surgery/procedure, but do not take the morning of the day of surgery.    5HT3 Antagonists     Medication Name     ondansetron (ZOFRAN) 4 mg tablet      Continue to take this medication on your normal schedule.  If this is an oral medication and you take it in the morning, then you may take this medicine with a sip of water.    Seizure Medication     Medication Name     clonazePAM (KlonoPIN) 0.5 mg tablet      Continue to take this medication on your normal schedule.  If this is an oral medication and you take it in the morning, then you may take this medicine with a sip of water.    Benzodiazepine Antagonist     Medication Name     clonazePAM (KlonoPIN) 0.5 mg tablet      If this medication is needed please continue to take on your normal schedule.  If you take it in the morning, then you may take this medicine with a sip of water.    Buprenorphine     Medication Name     Belbuca 75 MCG FILM     transdermal buprenorphine (BUTRANS) 10 mcg/hr TD patch      Continue to take this medication on your normal schedule.  If this is an oral medication and you take it in the morning, then you may take this medicine with a sip of water.    Opioid Medications     Medication Name     Belbuca 75 MCG FILM     transdermal buprenorphine (BUTRANS) 10 mcg/hr TD patch     HYDROcodone-acetaminophen (NORCO) 5-325 mg per tablet      Continue to take this medication on your normal schedule.  If this is an oral medication and you take it in the morning, then you may take this medicine with a sip of water.

## 2025-06-13 ENCOUNTER — APPOINTMENT (OUTPATIENT)
Dept: LAB | Facility: CLINIC | Age: 50
End: 2025-06-13
Payer: COMMERCIAL

## 2025-06-13 DIAGNOSIS — Z13.6 SCREENING FOR CARDIOVASCULAR CONDITION: ICD-10-CM

## 2025-06-13 DIAGNOSIS — Z01.818 PRE-OP EXAM: ICD-10-CM

## 2025-06-13 LAB
ATRIAL RATE: 73 BPM
P AXIS: 75 DEGREES
PR INTERVAL: 150 MS
QRS AXIS: 34 DEGREES
QRSD INTERVAL: 84 MS
QT INTERVAL: 382 MS
QTC INTERVAL: 421 MS
T WAVE AXIS: 30 DEGREES
VENTRICULAR RATE: 73 BPM

## 2025-06-13 PROCEDURE — 93010 ELECTROCARDIOGRAM REPORT: CPT | Performed by: INTERNAL MEDICINE

## 2025-06-16 ENCOUNTER — RESULTS FOLLOW-UP (OUTPATIENT)
Dept: FAMILY MEDICINE CLINIC | Facility: CLINIC | Age: 50
End: 2025-06-16

## 2025-06-16 ENCOUNTER — TELEPHONE (OUTPATIENT)
Age: 50
End: 2025-06-16

## 2025-06-16 NOTE — TELEPHONE ENCOUNTER
Please advise EKG normal.   I left her a message earlier    I faxed pre op clearance as well.     thanks

## 2025-06-16 NOTE — TELEPHONE ENCOUNTER
The patient called she had her EKG done on Friday at [facility]   she would like the EKG results, the lab work and the clearance form faxed to the surgeon   thank you

## 2025-06-17 DIAGNOSIS — E87.6 HYPOKALEMIA: ICD-10-CM

## 2025-06-17 RX ORDER — POTASSIUM CHLORIDE 750 MG/1
10 TABLET, EXTENDED RELEASE ORAL DAILY
Qty: 10 TABLET | Refills: 0 | Status: SHIPPED | OUTPATIENT
Start: 2025-06-17 | End: 2025-06-27

## 2025-06-24 NOTE — TELEPHONE ENCOUNTER
Rosemary from Dr Palmer office would like to have labs from 06/05 and chest xray faxed to them at [phone]. Thank you

## 2025-06-24 NOTE — TELEPHONE ENCOUNTER
Call from patient obtain fax number to send over EKG. Mentioned to patient call was just received from Dr Palmer office with same request.

## 2025-06-26 DIAGNOSIS — E87.6 HYPOKALEMIA: ICD-10-CM

## 2025-06-27 RX ORDER — POTASSIUM CHLORIDE 750 MG/1
10 TABLET, EXTENDED RELEASE ORAL DAILY
Qty: 10 TABLET | Refills: 0 | OUTPATIENT
Start: 2025-06-27 | End: 2025-07-07

## 2025-08-20 ENCOUNTER — OFFICE VISIT (OUTPATIENT)
Dept: FAMILY MEDICINE CLINIC | Facility: CLINIC | Age: 50
End: 2025-08-20
Payer: COMMERCIAL

## 2025-08-20 VITALS
SYSTOLIC BLOOD PRESSURE: 120 MMHG | WEIGHT: 181.5 LBS | OXYGEN SATURATION: 98 % | BODY MASS INDEX: 33.4 KG/M2 | HEART RATE: 95 BPM | HEIGHT: 62 IN | TEMPERATURE: 98.7 F | DIASTOLIC BLOOD PRESSURE: 70 MMHG

## 2025-08-20 DIAGNOSIS — M54.12 CERVICAL RADICULOPATHY: ICD-10-CM

## 2025-08-20 DIAGNOSIS — R42 DIZZINESS: ICD-10-CM

## 2025-08-20 DIAGNOSIS — I10 PRIMARY HYPERTENSION: Primary | ICD-10-CM

## 2025-08-20 DIAGNOSIS — M54.81 BILATERAL OCCIPITAL NEURALGIA: ICD-10-CM

## 2025-08-20 DIAGNOSIS — G89.29 CHRONIC INTRACTABLE HEADACHE, UNSPECIFIED HEADACHE TYPE: ICD-10-CM

## 2025-08-20 DIAGNOSIS — R51.9 CHRONIC INTRACTABLE HEADACHE, UNSPECIFIED HEADACHE TYPE: ICD-10-CM

## 2025-08-20 DIAGNOSIS — F32.2 SEVERE MAJOR DEPRESSIVE DISORDER (HCC): ICD-10-CM

## 2025-08-20 DIAGNOSIS — G93.32 CHRONIC FATIGUE SYNDROME: ICD-10-CM

## 2025-08-20 PROCEDURE — 99214 OFFICE O/P EST MOD 30 MIN: CPT | Performed by: NURSE PRACTITIONER

## 2025-08-20 RX ORDER — FREMANEZUMAB-VFRM 225 MG/1.5ML
225 INJECTION SUBCUTANEOUS
COMMUNITY
Start: 2025-08-07 | End: 2026-07-09

## 2025-08-20 RX ORDER — RIZATRIPTAN BENZOATE 10 MG/1
TABLET ORAL
COMMUNITY

## (undated) DEVICE — TRAY PAIN SUPPORT

## (undated) DEVICE — SKIN MARKER DUAL TIP WITH RULER CAP, FLEXIBLE RULER AND LABELS: Brand: DEVON

## (undated) DEVICE — CVD CANNULA

## (undated) DEVICE — SYRINGE LUER LOK 7ML LOSS OF RESISTANCE

## (undated) DEVICE — PLASTIC ADHESIVE BANDAGE: Brand: CURITY

## (undated) DEVICE — PACK GENERAL LF

## (undated) DEVICE — RADIOLOGY STERILE LABELS: Brand: CENTURION

## (undated) DEVICE — SYRINGE 5ML LL

## (undated) DEVICE — GLOVE SRG LF STRL BGL SKNSNS 7.5 PF

## (undated) DEVICE — SUT MONOCRYL 4-0 PS-2 27 IN Y426H

## (undated) DEVICE — GLOVE SRG BIOGEL ECLIPSE 7.5

## (undated) DEVICE — ADHESIVE SKIN CLSR DERMABOND NX

## (undated) DEVICE — ENDOPATH XCEL BLADELESS TROCARS WITH STABILITY SLEEVES: Brand: ENDOPATH XCEL

## (undated) DEVICE — TOWEL SET X-RAY

## (undated) DEVICE — MAYO STAND COVER: Brand: CONVERTORS

## (undated) DEVICE — SPONGE LAP 18 X 18 IN STRL RFD

## (undated) DEVICE — CHLORAPREP HI-LITE 26ML ORANGE

## (undated) DEVICE — KERLIX BANDAGE ROLL: Brand: KERLIX

## (undated) DEVICE — TIBURON LAPAROSCOPIC ABDOMINAL DRAPE: Brand: CONVERTORS

## (undated) DEVICE — NEEDLE SPINAL 25G X 3.5 IN QUINCKE

## (undated) DEVICE — POWER SHELL SIGNIA

## (undated) DEVICE — TROCAR: Brand: KII FIOS FIRST ENTRY

## (undated) DEVICE — DRAPE UTILITY

## (undated) DEVICE — Device

## (undated) DEVICE — VISUALIZATION SYSTEM: Brand: CLEARIFY

## (undated) DEVICE — INSUFFLATION NEEDLE TO ESTABLISH PNEUMOPERITONEUM.: Brand: INSUFFLATION NEEDLE

## (undated) DEVICE — GLOVE SRG BIOGEL 7

## (undated) DEVICE — TRAY EPIDURAL PERIFIX 20GA X 3.5IN TUOHY 8ML

## (undated) DEVICE — CHLORAPREP APPLICATOR TINTED 10.5ML ONE-STEP

## (undated) DEVICE — ASTOUND STANDARD SURGICAL GOWN, XL: Brand: CONVERTORS

## (undated) DEVICE — SYRINGE 10ML LL

## (undated) DEVICE — HARMONIC 1100 SHEARS, 36CM SHAFT LENGTH: Brand: HARMONIC

## (undated) DEVICE — GROUNDING PAD UNIVERSAL SLW

## (undated) DEVICE — SYRINGE 3ML LL

## (undated) DEVICE — 3M™ TEGADERM™ TRANSPARENT FILM DRESSING FRAME STYLE, 1626W, 4 IN X 4-3/4 IN (10 CM X 12 CM), 50/CT 4CT/CASE: Brand: 3M™ TEGADERM™

## (undated) DEVICE — COVIDIEN GIA BLACK (XTRA THICK) RELOAD

## (undated) DEVICE — TELFA NON-ADHERENT ABSORBENT DRESSING: Brand: TELFA

## (undated) DEVICE — INTENDED FOR TISSUE SEPARATION, AND OTHER PROCEDURES THAT REQUIRE A SHARP SURGICAL BLADE TO PUNCTURE OR CUT.: Brand: BARD-PARKER SAFETY BLADES SIZE 11, STERILE

## (undated) DEVICE — NEEDLE BLUNT 18 G X 1 1/2 W FILTER

## (undated) DEVICE — ENDOPATH 5MM CURVED SCISSORS WITH MONOPOLAR CAUTERY: Brand: ENDOPATH

## (undated) DEVICE — GLOVE INDICATOR PI UNDERGLOVE SZ 7 BLUE

## (undated) DEVICE — [HIGH FLOW INSUFFLATOR,  DO NOT USE IF PACKAGE IS DAMAGED,  KEEP DRY,  KEEP AWAY FROM SUNLIGHT,  PROTECT FROM HEAT AND RADIOACTIVE SOURCES.]: Brand: PNEUMOSURE

## (undated) DEVICE — PAD CAST 6 IN COTTON NON STERILE

## (undated) DEVICE — 3/8 INCH SMOKE TUBING

## (undated) DEVICE — SUT VICRYL 0 54 IN J207G

## (undated) DEVICE — VISIGI 3D®  CALIBRATION SYSTEM  SIZE 36FR SLEEVE/STD: Brand: BOEHRINGER® VISIGI 3D™ SLEEVE GASTRECTOMY CALIBRATION SYSTEM, SIZE 36FR

## (undated) DEVICE — IRRIG ENDO FLO TUBING

## (undated) DEVICE — SYRINGE 20ML LL

## (undated) DEVICE — COVIDIEN ENDO GIA PURPLE (MED) RELOAD 60MM

## (undated) DEVICE — VIOLET BRAIDED (POLYGLACTIN 910), SYNTHETIC ABSORBABLE SUTURE: Brand: COATED VICRYL

## (undated) DEVICE — NIMBUS MT RF ELECTRODE 100MM

## (undated) DEVICE — NEEDLE SPINAL 20G X 3.5 LF

## (undated) DEVICE — PMI DISPOSABLE PUNCTURE CLOSURE DEVICE / SUTURE GRASPER: Brand: PMI